# Patient Record
Sex: FEMALE | Race: WHITE | HISPANIC OR LATINO | Employment: FULL TIME | URBAN - METROPOLITAN AREA
[De-identification: names, ages, dates, MRNs, and addresses within clinical notes are randomized per-mention and may not be internally consistent; named-entity substitution may affect disease eponyms.]

---

## 2018-01-17 ENCOUNTER — ALLSCRIPTS OFFICE VISIT (OUTPATIENT)
Dept: OTHER | Facility: OTHER | Age: 28
End: 2018-01-17

## 2018-01-17 DIAGNOSIS — Z34.91 ENCOUNTER FOR SUPERVISION OF NORMAL PREGNANCY IN FIRST TRIMESTER: ICD-10-CM

## 2018-01-19 ENCOUNTER — GENERIC CONVERSION - ENCOUNTER (OUTPATIENT)
Dept: OTHER | Facility: OTHER | Age: 28
End: 2018-01-19

## 2018-01-19 ENCOUNTER — ALLSCRIPTS OFFICE VISIT (OUTPATIENT)
Dept: PERINATAL CARE | Facility: CLINIC | Age: 28
End: 2018-01-19
Payer: COMMERCIAL

## 2018-01-19 ENCOUNTER — APPOINTMENT (OUTPATIENT)
Dept: LAB | Facility: HOSPITAL | Age: 28
End: 2018-01-19
Payer: COMMERCIAL

## 2018-01-19 ENCOUNTER — TRANSCRIBE ORDERS (OUTPATIENT)
Dept: LAB | Facility: HOSPITAL | Age: 28
End: 2018-01-19

## 2018-01-19 DIAGNOSIS — Z34.91 ENCOUNTER FOR SUPERVISION OF NORMAL PREGNANCY IN FIRST TRIMESTER: ICD-10-CM

## 2018-01-19 LAB
ABO GROUP BLD: NORMAL
BASOPHILS # BLD AUTO: 0.01 THOUSANDS/ΜL (ref 0–0.1)
BASOPHILS NFR BLD AUTO: 0 % (ref 0–1)
BILIRUB UR QL STRIP: NEGATIVE
BLD GP AB SCN SERPL QL: NEGATIVE
CLARITY UR: NORMAL
COLOR UR: YELLOW
EOSINOPHIL # BLD AUTO: 0.07 THOUSAND/ΜL (ref 0–0.61)
EOSINOPHIL NFR BLD AUTO: 1 % (ref 0–6)
ERYTHROCYTE [DISTWIDTH] IN BLOOD BY AUTOMATED COUNT: 12.6 % (ref 11.6–15.1)
GLUCOSE 1H P 50 G GLC PO SERPL-MCNC: 87 MG/DL
GLUCOSE UR STRIP-MCNC: NEGATIVE MG/DL
HCT VFR BLD AUTO: 38.7 % (ref 34.8–46.1)
HGB BLD-MCNC: 13.8 G/DL (ref 11.5–15.4)
HGB UR QL STRIP.AUTO: NEGATIVE
KETONES UR STRIP-MCNC: NEGATIVE MG/DL
LEUKOCYTE ESTERASE UR QL STRIP: NEGATIVE
LYMPHOCYTES # BLD AUTO: 1.51 THOUSANDS/ΜL (ref 0.6–4.47)
LYMPHOCYTES NFR BLD AUTO: 25 % (ref 14–44)
MCH RBC QN AUTO: 29.3 PG (ref 26.8–34.3)
MCHC RBC AUTO-ENTMCNC: 35.7 G/DL (ref 31.4–37.4)
MCV RBC AUTO: 82 FL (ref 82–98)
MONOCYTES # BLD AUTO: 0.47 THOUSAND/ΜL (ref 0.17–1.22)
MONOCYTES NFR BLD AUTO: 8 % (ref 4–12)
NEUTROPHILS # BLD AUTO: 3.89 THOUSANDS/ΜL (ref 1.85–7.62)
NEUTS SEG NFR BLD AUTO: 66 % (ref 43–75)
NITRITE UR QL STRIP: NEGATIVE
NRBC BLD AUTO-RTO: 0 /100 WBCS
PH UR STRIP.AUTO: 6.5 [PH] (ref 4.5–8)
PLATELET # BLD AUTO: 294 THOUSANDS/UL (ref 149–390)
PMV BLD AUTO: 9.8 FL (ref 8.9–12.7)
PROT UR STRIP-MCNC: NEGATIVE MG/DL
RBC # BLD AUTO: 4.71 MILLION/UL (ref 3.81–5.12)
RH BLD: POSITIVE
RUBV IGG SERPL IA-ACNC: 78.5 IU/ML
SP GR UR STRIP.AUTO: 1.01 (ref 1–1.03)
SPECIMEN EXPIRATION DATE: NORMAL
UROBILINOGEN UR QL STRIP.AUTO: 0.2 E.U./DL
WBC # BLD AUTO: 5.96 THOUSAND/UL (ref 4.31–10.16)

## 2018-01-19 PROCEDURE — 82950 GLUCOSE TEST: CPT

## 2018-01-19 PROCEDURE — 87077 CULTURE AEROBIC IDENTIFY: CPT

## 2018-01-19 PROCEDURE — 36415 COLL VENOUS BLD VENIPUNCTURE: CPT

## 2018-01-19 PROCEDURE — 87086 URINE CULTURE/COLONY COUNT: CPT

## 2018-01-19 PROCEDURE — 76801 OB US < 14 WKS SINGLE FETUS: CPT | Performed by: OBSTETRICS & GYNECOLOGY

## 2018-01-19 PROCEDURE — 81003 URINALYSIS AUTO W/O SCOPE: CPT

## 2018-01-19 PROCEDURE — 80081 OBSTETRIC PANEL INC HIV TSTG: CPT

## 2018-01-20 LAB — HBV SURFACE AG SER QL: NORMAL

## 2018-01-22 ENCOUNTER — LAB REQUISITION (OUTPATIENT)
Dept: LAB | Facility: HOSPITAL | Age: 28
End: 2018-01-22
Payer: COMMERCIAL

## 2018-01-22 DIAGNOSIS — Z11.3 ENCOUNTER FOR SCREENING FOR INFECTIONS WITH PREDOMINANTLY SEXUAL MODE OF TRANSMISSION: ICD-10-CM

## 2018-01-22 DIAGNOSIS — Z34.91 ENCOUNTER FOR SUPERVISION OF NORMAL PREGNANCY IN FIRST TRIMESTER: ICD-10-CM

## 2018-01-22 DIAGNOSIS — Z01.419 ENCOUNTER FOR GYNECOLOGICAL EXAMINATION WITHOUT ABNORMAL FINDING: ICD-10-CM

## 2018-01-22 LAB
BACTERIA UR CULT: ABNORMAL
HIV 1+2 AB+HIV1 P24 AG SERPL QL IA: NORMAL
RPR SER QL: NORMAL

## 2018-01-22 PROCEDURE — G0145 SCR C/V CYTO,THINLAYER,RESCR: HCPCS | Performed by: NURSE PRACTITIONER

## 2018-01-22 PROCEDURE — 87491 CHLMYD TRACH DNA AMP PROBE: CPT | Performed by: NURSE PRACTITIONER

## 2018-01-22 PROCEDURE — 87591 N.GONORRHOEAE DNA AMP PROB: CPT | Performed by: NURSE PRACTITIONER

## 2018-01-23 VITALS
BODY MASS INDEX: 19.73 KG/M2 | HEART RATE: 96 BPM | WEIGHT: 107.2 LBS | SYSTOLIC BLOOD PRESSURE: 110 MMHG | HEIGHT: 62 IN | DIASTOLIC BLOOD PRESSURE: 78 MMHG

## 2018-01-23 NOTE — PROGRESS NOTES
RUBÉN 2018         RE: Kati Pham                           To: [address]   MR#: [id number]                                  [address]   : [address], [address] [facility]   ENC: [id number]                             Fax: [phone]   (Exam #: [id number])      The LMP of this 33year old,  G4, P1-0-2-1 patient was unknown, her   working BERONICA is AUG 21 2018 and the current gestational age is 10 weeks 4   days by [address] [place] [address]  A sonographic examination was performed on 2018 using real time equipment  The ultrasound examination was   performed using abdominal technique  The patient has a BMI of 20 6  Her   blood pressure today was 104/69  Pt states she is uncertain of start date of her LMP  Earliest US on   record:  MFM 18  9w4d  BERONICA 18 Multiple longitudinal and   transverse sections revealed a ponce intrauterine pregnancy  A normal gestational sac was documented  A normal fetal pole was   visualized  Cardiac motion was observed at 167 bpm  The yolk sac was seen,   measuring 0 29 cm  The amnion was also documented  INDICATIONS      confirm gestational age      Exam Types      Level I      RESULTS      Fetus # 1 of 1   Fetal growth appeared normal      MEASUREMENTS (* Included In Average GA)      CRL              2 9 cm        9 weeks 4 days *      THE AVERAGE GESTATIONAL AGE is 9 weeks 4 days +/- 7 days  ANATOMY COMMENTS      Anatomic detail is extremely limited at this gestational age  A discrete   fetal pole with cardiac motion was documented  Limb buds were documented   as well  The gestational sac is normal in appearance and located in the   fundus of the uterus  No gross abnormalities were noted on this   examination  Free fluid is not seen in the posterior cul-de-sac  There is   no suspicion of a subchorionic hematoma        ADNEXA      The left ovary appeared normal and measured 2 2 x 2 0 x 2 1 cm with a   volume of 4 7 cc  The right ovary appeared normal and measured 3 2 x 2 1 x   2 1 cm with a volume of 7 0 cc       AMNIOTIC FLUID         Largest Vertical Pocket = 2 5 cm   Amniotic Fluid: Normal      IMPRESSION      Dove IUP   9 weeks and 4 days by this ultrasound  (BERONICA=AUG 20 2018)   9 weeks and 4 days by 1st Tri Sono  (BERONICA=AUG 20 2018)   Fetal growth appeared normal   Regular fetal heart rate of 167 bpm      GENERAL COMMENT      Ms Eda Fuchs is here for pregnancy dating  There is a single live intrauterine pregnancy with biometry consistent   with gestational age established by outside ultrasound  No gross anomalies were identified on limited views  Amniotic fluid is within normal limits  Evaluation and Management:   The patient was counseled regarding the above findings  The limitations   of ultrasound were reviewed  The limitations of ultrasound and aneuploidy screening were explained to   her  Genetic screening options were discussed with her  She should   return in 3 weeks for nuchal translucency  At the conclusion of today's encounter, all questions were answered to her   satisfaction  Thank you very much for this kind referral and please do   not hesitate to contact me with any further questions or concerns  ROMERO Fox M D     Maternal Fetal Medicine   Electronically signed 01/19/18 17:50

## 2018-01-23 NOTE — MISCELLANEOUS
Message  Per Mirta Cochran, she recommends that pt take rhinocort allergy spray instead of flonase during the pregnancy  I called pt and gave her this info  Pt scheduled for PN H&P for 1/22/18  Active Problems    1  Encounter for supervision of normal pregnancy in first trimester (V22 1) (Z34 91)    Current Meds   1  Prenatal 27-0 8 MG Oral Tablet; TAKE 1 TABLET DAILY AS DIRECTED; Therapy: [id number] to (Evaluate:[id number]) Recorded    Allergies    1  No Known Drug Allergies    2  Dust   3  Dust Mite   4  Grass   5  Pollen   6  Seasonal   7  Trees    Plan  Encounter for supervision of normal pregnancy in first trimester    · (1) GLUCOSE, 1HR PG (50gm Glu Challenge Preg-Pts); Status:Active; Requested  CHRIS:[id number];    · (1) PRENATAL PANEL; Status:Active;  Requested RDS:[id number];    · *1 - [address] and Ultrasound Only  Level I Dating US  LMP 11/06/17  BERONICA 08/13/18  Status: Hold For - Scheduling  Requested for: [id number]  Care Summary provided  : Yes    Signatures   Electronically signed by : Larry Collado RN; Jan 18 2018  5:18PM EST                       (Author)

## 2018-01-24 VITALS
BODY MASS INDEX: 19.88 KG/M2 | SYSTOLIC BLOOD PRESSURE: 104 MMHG | WEIGHT: 108.04 LBS | HEIGHT: 62 IN | HEART RATE: 87 BPM | DIASTOLIC BLOOD PRESSURE: 69 MMHG

## 2018-01-25 ENCOUNTER — TELEPHONE (OUTPATIENT)
Dept: OBGYN CLINIC | Facility: CLINIC | Age: 28
End: 2018-01-25

## 2018-01-25 LAB
CHLAMYDIA DNA CVX QL NAA+PROBE: NORMAL
LAB AP GYN PRIMARY INTERPRETATION: NORMAL
Lab: NORMAL
N GONORRHOEA DNA GENITAL QL NAA+PROBE: NORMAL
PATH INTERP SPEC-IMP: NORMAL

## 2018-01-29 ENCOUNTER — TELEPHONE (OUTPATIENT)
Dept: OBGYN CLINIC | Facility: CLINIC | Age: 28
End: 2018-01-29

## 2018-02-19 ENCOUNTER — ROUTINE PRENATAL (OUTPATIENT)
Dept: OBGYN CLINIC | Facility: CLINIC | Age: 28
End: 2018-02-19
Payer: COMMERCIAL

## 2018-02-19 VITALS — DIASTOLIC BLOOD PRESSURE: 67 MMHG | WEIGHT: 107.2 LBS | BODY MASS INDEX: 19.93 KG/M2 | SYSTOLIC BLOOD PRESSURE: 95 MMHG

## 2018-02-19 DIAGNOSIS — Z34.82 ENCOUNTER FOR SUPERVISION OF OTHER NORMAL PREGNANCY IN SECOND TRIMESTER: Primary | ICD-10-CM

## 2018-02-19 DIAGNOSIS — Z13.79 GENETIC SCREENING: ICD-10-CM

## 2018-02-19 PROCEDURE — 99214 OFFICE O/P EST MOD 30 MIN: CPT | Performed by: NURSE PRACTITIONER

## 2018-02-19 RX ORDER — PYRIDOXINE HCL (VITAMIN B6) 25 MG
TABLET ORAL
COMMUNITY
Start: 2018-01-22 | End: 2018-07-26

## 2018-02-19 RX ORDER — PNV NO.95/FERROUS FUM/FOLIC AC 28MG-0.8MG
1 TABLET ORAL DAILY
COMMUNITY
Start: 2018-01-17 | End: 2020-01-28

## 2018-02-19 NOTE — PROGRESS NOTES
Reports her nausea is decreasing and she feels like she is getting her appetite back  She did not get the sequential screen part 1 because of insurance issues  We did review quad screen and genetic counseling because father of the baby has many family members with Jodi's chorea  Pap was negative on 2018 and cultures for [facility] and [facility] were both negative  Request for genetic counseling and  Center given patient  Request for quad screen given and information on quad screen given to patient  Patient has appointment for level 2 ultrasound on 2018 at [facility]  Reviewed with patient if she does not feel better within 2 weeks to return to office for weight check    Patient to return to office in 4 weeks

## 2018-02-19 NOTE — PATIENT INSTRUCTIONS
Next OB appointment in 4 weeks, return sooner if needed    Please make appointment for genetic counseling at the  center

## 2018-03-07 NOTE — PROGRESS NOTES
Education  Baby & Me Education 1st Trimester:   First Trimester Education provided:   benefits of breastfeeding, importance of exclusive breastfeeding, early initiation of breastfeeding, exclusive breastfeeding for the first 6 months, Pregnancy Essentials Reference Guide given and Other education given: Skin-to-skin, rooming-in, lactation consultant in hospital     The patient is planning on breastfeeding     Prenatal education provided by: Robyn Johnson RN      Signatures   Electronically signed by : Robyn Johnson RN; Jan 17 2018  5:00PM EST                       (Author)

## 2018-04-05 ENCOUNTER — ROUTINE PRENATAL (OUTPATIENT)
Dept: OBGYN CLINIC | Facility: CLINIC | Age: 28
End: 2018-04-05
Payer: COMMERCIAL

## 2018-04-05 VITALS — SYSTOLIC BLOOD PRESSURE: 122 MMHG | WEIGHT: 110.8 LBS | DIASTOLIC BLOOD PRESSURE: 72 MMHG | BODY MASS INDEX: 20.6 KG/M2

## 2018-04-05 DIAGNOSIS — L71.9 ROSACEA: Primary | ICD-10-CM

## 2018-04-05 DIAGNOSIS — Z3A.20 20 WEEKS GESTATION OF PREGNANCY: ICD-10-CM

## 2018-04-05 LAB
SL AMB  POCT GLUCOSE, UA: ABNORMAL
SL AMB POCT URINE PROTEIN: ABNORMAL

## 2018-04-05 PROCEDURE — 99213 OFFICE O/P EST LOW 20 MIN: CPT | Performed by: OBSTETRICS & GYNECOLOGY

## 2018-04-05 PROCEDURE — 81002 URINALYSIS NONAUTO W/O SCOPE: CPT | Performed by: OBSTETRICS & GYNECOLOGY

## 2018-04-05 RX ORDER — METRONIDAZOLE 7.5 MG/G
GEL TOPICAL 2 TIMES DAILY
Qty: 45 G | Refills: 0 | Status: SHIPPED | OUTPATIENT
Start: 2018-04-05 | End: 2018-07-26

## 2018-04-05 NOTE — PROGRESS NOTES
Subjective     Jhonny Martel is a 33 y o  female being seen today for her obstetrical visit  She is at 20w3d gestation  Patient reports no bleeding, no contractions, no cramping, no leaking and however complains of thin white discharge and her roseacea acting up  Fetal movement: normal  Patient states that she has had yeast infections in the past and her current symptoms do not feel like a yeast infection  No pruritis, foul smelling discharge, urinary symptoms  She also states that her nausea/vomiting symptoms have also improved significantly since last visit  Menstrual History:  OB History      Para Term  AB Living    4 1 1 0 2 1    SAB TAB Ectopic Multiple Live Births    1 1 0 0 1         No LMP recorded  Patient is pregnant  The following portions of the patient's history were reviewed and updated as appropriate: allergies, current medications, past family history, past medical history, past social history, past surgical history and problem list     Review of Systems    Review of Systems   Constitutional: Negative for chills, fatigue and fever  Respiratory: Negative for cough, choking, chest tightness, shortness of breath and wheezing  Cardiovascular: Negative for chest pain, palpitations and leg swelling  Gastrointestinal: Negative for abdominal pain, constipation, diarrhea, nausea and vomiting  Genitourinary: Negative for decreased urine volume, dysuria, frequency, hematuria, pelvic pain, vaginal bleeding, vaginal discharge and vaginal pain  Musculoskeletal: Negative for arthralgias, back pain, joint swelling and myalgias  Skin: Negative  Neurological: Negative for dizziness, tremors, seizures, weakness, light-headedness, numbness and headaches  Psychiatric/Behavioral: Negative            Objective      /72   Wt 50 3 kg (110 lb 12 8 oz)   BMI 20 60 kg/m²   FHT: 150 BPM   Uterine Size: 20 cm and size equals dates      Gen: NAD, A&O  CVS: RRR (+) S1S2  Pulm: CTAB, symmetric  Abd: Soft, NT, normoactive bowel sounds  Gravid, nontender uterus  Ext: Atraumatic, (-) edema  Assessment    Pregnancy 20 and 3/7 weeks   Rosacea    Plan    · Prescribed metrogel for rosacea  Counseled patient on safety of metronidazole in pregnancy  While it is considered teratogenic in the first trimester, she is now in her second trimester  Additionally, the amount of the medication absorbed through her skin into the blood stream is minimal / negligible  · Counseled patient that her discharge is likely normal physiologic discharge  Advised that if it persists, causes her any discomfort, changes in character (color, consistency, odor), she may call to follow up  · Counseled that consent for sterilization may be signed at 32 weeks  Advised that with a natural delivery, sterilization may not be done until at least 6 weeks postpartum / after postpartum check  With C/S sterilization may be done at the time of the procedure  Patient will continue to think about her options  · Nausea/vomiting resolved  · Quad screen never done  Advised patient of window to get it done (before 23w GA)  · Continue PNVs   · Signs and symptoms of  labor: discussed  · Next  Ctr appointment 5/3/18  · Follow up in 4 weeks  All patient questions and concerns addressed  Patient agrees with her treatment plan  RTO 4 weeks       Pati Partida DO  18  10:14 AM

## 2018-04-16 ENCOUNTER — ROUTINE PRENATAL (OUTPATIENT)
Dept: OBGYN CLINIC | Facility: HOSPITAL | Age: 28
End: 2018-04-16
Payer: COMMERCIAL

## 2018-04-16 ENCOUNTER — TELEPHONE (OUTPATIENT)
Dept: OBGYN CLINIC | Facility: CLINIC | Age: 28
End: 2018-04-16

## 2018-04-16 VITALS
DIASTOLIC BLOOD PRESSURE: 72 MMHG | HEIGHT: 62 IN | HEART RATE: 111 BPM | BODY MASS INDEX: 20.8 KG/M2 | SYSTOLIC BLOOD PRESSURE: 105 MMHG | WEIGHT: 113 LBS

## 2018-04-16 DIAGNOSIS — Z3A.22 22 WEEKS GESTATION OF PREGNANCY: ICD-10-CM

## 2018-04-16 DIAGNOSIS — R30.0 BURNING WITH URINATION: Primary | ICD-10-CM

## 2018-04-16 DIAGNOSIS — Z34.92 SECOND TRIMESTER PREGNANCY: ICD-10-CM

## 2018-04-16 PROBLEM — O21.9 NAUSEA AND VOMITING IN PREGNANCY: Status: ACTIVE | Noted: 2018-01-22

## 2018-04-16 LAB
SL AMB  POCT GLUCOSE, UA: NORMAL
SL AMB LEUKOCYTE ESTERASE,UA: NORMAL
SL AMB POCT BILIRUBIN,UA: NORMAL
SL AMB POCT BLOOD,UA: NORMAL
SL AMB POCT CLARITY,UA: CLEAR
SL AMB POCT COLOR,UA: YELLOW
SL AMB POCT KETONES,UA: NORMAL
SL AMB POCT NITRITE,UA: NORMAL
SL AMB POCT PH,UA: 7.5
SL AMB POCT SPECIFIC GRAVITY,UA: 1.01
SL AMB POCT URINE PROTEIN: NORMAL
SL AMB POCT UROBILINOGEN: 0.2

## 2018-04-16 PROCEDURE — 87086 URINE CULTURE/COLONY COUNT: CPT | Performed by: OBSTETRICS & GYNECOLOGY

## 2018-04-16 PROCEDURE — 99213 OFFICE O/P EST LOW 20 MIN: CPT | Performed by: OBSTETRICS & GYNECOLOGY

## 2018-04-16 PROCEDURE — 81002 URINALYSIS NONAUTO W/O SCOPE: CPT | Performed by: OBSTETRICS & GYNECOLOGY

## 2018-04-16 RX ORDER — NITROFURANTOIN 25; 75 MG/1; MG/1
100 CAPSULE ORAL 2 TIMES DAILY
Qty: 14 CAPSULE | Refills: 0 | Status: SHIPPED | OUTPATIENT
Start: 2018-04-16 | End: 2018-07-26

## 2018-04-16 NOTE — PROGRESS NOTES
Patient for routine prenatal   She has been complaining 2 days of burning with urination  She had intermittently 2 days ago but noticed over the last day that is been every time she voids  She also notices suprapubic discomfort  She has had a history of a urinary tract infection "many years ago"  She says it feels like that  She denies any abnormal vaginal discharge  She denies any vaginal bleeding  She denies any fevers or chills  Urinary tract infection:  First urine specimen was not a clean catch  Patient to try to void again as a clean-catch  Will empirically treat for urinary tract infection with Macrobid 100 mg twice daily for 7 days  If patient unable to void and a for urine culture now, will provide patient with lab slip for outpatient urine culture collection  Patient given  Precautions to call if she has any fevers, chills, worsening suprapubic pain, or back pain  Pregnancy at 22 weeks:  Patient has not yet had level 2 ultrasound  Per her, she was not scheduled until May because of the dating issue with the  Center  She then tried to reschedule but there were no points available  I discussed the Abran Pyle, who will contact the [facility]  tomorrow to schedule an appointment for level 2 ultrasound  At a sooner date

## 2018-04-16 NOTE — PATIENT INSTRUCTIONS
Pregnancy at 23 to 22 [address]:   Now that you are in your second trimester, you have more energy  You may also be feeling hungrier than usual  You may be gaining about ½ to 1 pound a week, and your pregnancy is beginning to show  You may need to start wearing maternity clothes  As your baby gets larger, you may have other symptoms  These may include body aches or stretch marks on your abdomen, breasts, thighs, or buttocks  DISCHARGE INSTRUCTIONS:   Seek care immediately if:   · You develop a severe headache that does not go away  · You have new or increased vision changes, such as blurred or spotted vision  · You have new or increased swelling in your face or hands  · You have vaginal spotting or bleeding  · Your water broke or you feel warm water gushing or trickling from your vagina  Contact your healthcare provider if:   · You have abdominal cramps, pressure, or tightening  · You have a change in vaginal discharge  · You cannot keep food or drinks down, and you are losing weight  · You have chills or a fever  · You have vaginal itching, burning, or pain  · You have yellow, green, white, or foul-smelling vaginal discharge  · You have pain or burning when you urinate, less urine than usual, or pink or bloody urine  · You have questions or concerns about your condition or care  How to care for yourself at this stage of your pregnancy:   · Eat a variety of healthy foods  Healthy foods include fruits, vegetables, whole-grain breads, low-fat dairy foods, beans, lean meats, and fish  Drink liquids as directed  Ask how much liquid to drink each day and which liquids are best for you  Limit caffeine to less than 200 milligrams each day  Limit your intake of fish to 2 servings each week  Choose fish low in mercury such as canned light tuna, shrimp, salmon, cod, or tilapia  Do not  eat fish high in mercury such as swordfish, tilefish, zoifa mackerel, and shark  · Take prenatal vitamins as directed  Your need for certain vitamins and minerals, such as folic acid, increases during pregnancy  Prenatal vitamins provide some of the extra vitamins and minerals you need  Prenatal vitamins may also help to decrease the risk of certain birth defects  · Talk to your healthcare provider about exercise  Moderate exercise can help you stay fit  Your healthcare provider will help you plan an exercise program that is safe for you during pregnancy  · Do not smoke  If you smoke, it is never too late to quit  Smoking increases your risk of a miscarriage and other health problems during your pregnancy  Smoking can cause your baby to be born too early or weigh less at birth  Ask your healthcare provider for information if you need help quitting  · Do not drink alcohol  Alcohol passes from your body to your baby through the placenta  It can affect your baby's brain development and cause fetal alcohol syndrome (FAS)  FAS is a group of conditions that causes mental, behavior, and growth problems  · Talk to your healthcare provider before you take any medicines  Many medicines may harm your baby if you take them when you are pregnant  Do not take any medicines, vitamins, herbs, or supplements without first talking to your healthcare provider  Never use illegal or street drugs (such as marijuana or cocaine) while you are pregnant  Safety tips during pregnancy:   · Avoid hot tubs and saunas  Do not use a hot tub or sauna while you are pregnant, especially during your first trimester  Hot tubs and saunas may raise your baby's temperature and increase the risk of birth defects  · Avoid toxoplasmosis  This is an infection caused by eating raw meat or being around infected cat feces  It can cause birth defects, miscarriages, and other problems  Wash your hands after you touch raw meat  Make sure any meat is well-cooked before you eat it   Avoid raw eggs and unpasteurized milk  Use gloves or ask someone else to clean your cat's litter box while you are pregnant  Changes that are happening with your baby:  By 22 weeks, your baby is about 8 inches long from the top of the head to the rump (baby's bottom)  Your baby also weighs about 1 pound  Your baby is becoming much more active  You may be able to feel the baby move inside you now  The first movements may not be that noticeable  They may feel like a fluttering sensation  As time goes on, your baby's movements will become stronger and more noticeable  What you need to know about prenatal care:  During the first 28 weeks of your pregnancy, you will see your healthcare provider once a month  Your healthcare provider will check your blood pressure and weight  You may also need the following:  · A urine test  may also be done to check for sugar and protein  These can be signs of gestational diabetes or infection  Protein in your urine may also be a sign of preeclampsia  Preeclampsia is a condition that can develop during week 20 or later of your pregnancy  It causes high blood pressure, and it can cause problems with your kidneys and other organs  · Fundal height  is a measurement of your uterus to check your baby's growth  This number is usually the same as the number of weeks that you have been pregnant  · A fetal ultrasound  shows pictures of your baby inside your uterus  It shows your baby's development  The movement and position of your baby can also be seen  Your healthcare provider may be able to tell you what your baby's gender is during the ultrasound  · Your baby's heart rate  will be checked  © 2017 [address] Information is for End User's use only and may not be sold, redistributed or otherwise used for commercial purposes  All illustrations and images included in CareNotes® are the copyrighted property of A D [employer]  or [employer]    The above information is an  only  It is not intended as medical advice for individual conditions or treatments  Talk to your doctor, nurse or pharmacist before following any medical regimen to see if it is safe and effective for you  Urinary Tract Infection in Pregnancy   AMBULATORY CARE:   A urinary tract infection (UTI)  is caused by bacteria that get inside your urinary tract  The urinary tract includes your kidneys and bladder  UTIs are common during pregnancy  This is because of changes in your immune system, hormones, and uterus  As your uterus grows, your bladder may not completely empty  Bacteria can grow in the urine left in your bladder and cause a UTI  UTIs during pregnancy can increase your risk for a kidney infection and  labor  Commons signs and symptoms of a UTI:   · Urinating more often, leaking urine, or waking from sleep to urinate    · Pain or burning when you urinate    · Pain or pressure in your lower abdomen     · Urine that smells bad    · Blood in your urine  Seek care immediately if:   · You are urinating very little or not at all  · You have severe pain  · You have a fever and chills  Contact your healthcare provider if:   · You have pain in the sides of your back  · You do not feel better after 2 days of treatment  · You are vomiting  · You have questions or concerns about your condition or care  Prevent a UTI:   · Urinate when you feel the urge  Do not hold your urine  Urinate as soon as needed  Always urinate after you have sex  This helps flush out bacteria passed during sex  · Drink liquids as directed  Ask how much liquid to drink each day and which liquids are best for you  You may need to drink more fluids than usual to help flush bacteria out of your urinary tract  Do not drink caffeine or carbonated liquids  These drinks can irritate your bladder  Your healthcare provider may recommend cranberry juice to help prevent a UTI      · Wipe from front to back after you urinate or have a bowel movement  This will help prevent germs from getting into your urinary tract through your urethra  · Do pelvic muscle exercises often  Pelvic muscle exercises may help you start and stop urinating  Strong pelvic muscles may help you empty your bladder easier  Squeeze these muscles tightly for 5 seconds like you are trying to hold back urine  Then relax for 5 seconds  Gradually work up to squeezing for 10 seconds  Do 3 sets of 15 repetitions a day, or as directed  Follow up with your healthcare provider as directed: You may need to return for more urine tests  Write down your questions so you remember to ask them during your visits  © 2017 [address] Information is for End User's use only and may not be sold, redistributed or otherwise used for commercial purposes  All illustrations and images included in CareNotes® are the copyrighted property of A D A [employer]  or [employer]  The above information is an  only  It is not intended as medical advice for individual conditions or treatments  Talk to your doctor, nurse or pharmacist before following any medical regimen to see if it is safe and effective for you

## 2018-04-16 NOTE — TELEPHONE ENCOUNTER
Pt called with c/o burning with urination and urinary frequency  Pt is 22wks pregnant  I told pt I do not have any provider here today  I provided pt with phone number to [place] office and sent task for RN from [place] office to call pt to assist with getting an appt or sending for urine test  Pt is working in [place] today and can go for appt after work  Informed pt that if she is not able to get an order for urine test or an appt today, she should go to Urgent Care

## 2018-04-17 LAB — BACTERIA UR CULT: NORMAL

## 2018-05-03 ENCOUNTER — ROUTINE PRENATAL (OUTPATIENT)
Dept: PERINATAL CARE | Facility: CLINIC | Age: 28
End: 2018-05-03
Payer: COMMERCIAL

## 2018-05-03 VITALS
HEIGHT: 62 IN | BODY MASS INDEX: 21.35 KG/M2 | SYSTOLIC BLOOD PRESSURE: 106 MMHG | HEART RATE: 102 BPM | WEIGHT: 116 LBS | DIASTOLIC BLOOD PRESSURE: 74 MMHG

## 2018-05-03 DIAGNOSIS — Z3A.24 24 WEEKS GESTATION OF PREGNANCY: ICD-10-CM

## 2018-05-03 DIAGNOSIS — O40.2XX0 POLYHYDRAMNIOS IN SINGLETON PREGNANCY IN SECOND TRIMESTER: Primary | ICD-10-CM

## 2018-05-03 DIAGNOSIS — Z36.3 ENCOUNTER FOR ANTENATAL SCREENING FOR MALFORMATIONS: ICD-10-CM

## 2018-05-03 PROCEDURE — 99212 OFFICE O/P EST SF 10 MIN: CPT | Performed by: OBSTETRICS & GYNECOLOGY

## 2018-05-03 PROCEDURE — 76811 OB US DETAILED SNGL FETUS: CPT | Performed by: OBSTETRICS & GYNECOLOGY

## 2018-05-03 NOTE — LETTER
May 3, 2018     Lian Foote MD  [address] [place] [address]    Patient: Jhonny Martel   YOB: 1990   Date of Visit: 5/3/2018       Dear Dr Sally Giles: Thank you for referring Jhonny Martel to me for evaluation  Below are my notes for this consultation  If you have questions, please do not hesitate to call me  I look forward to following your patient along with you  Sincerely,        Annie Montes MD        CC: [address] [place] [address]  Annie Montes MD  5/3/2018  6:22 PM  Sign at close encounter  Please refer to the [facility] ultrasound report in Ob Procedures for additional information regarding the visit to the [facility]  today

## 2018-05-03 NOTE — PROGRESS NOTES
Please refer to the [facility] ultrasound report in Ob Procedures for additional information regarding the visit to the [address] [place] [address] today

## 2018-05-07 ENCOUNTER — ROUTINE PRENATAL (OUTPATIENT)
Dept: OBGYN CLINIC | Facility: CLINIC | Age: 28
End: 2018-05-07
Payer: COMMERCIAL

## 2018-05-07 VITALS
HEIGHT: 62 IN | BODY MASS INDEX: 21.64 KG/M2 | SYSTOLIC BLOOD PRESSURE: 101 MMHG | HEART RATE: 105 BPM | DIASTOLIC BLOOD PRESSURE: 68 MMHG | WEIGHT: 117.6 LBS

## 2018-05-07 DIAGNOSIS — Z34.92 SECOND TRIMESTER PREGNANCY: Primary | ICD-10-CM

## 2018-05-07 DIAGNOSIS — Z3A.25 25 WEEKS GESTATION OF PREGNANCY: ICD-10-CM

## 2018-05-07 DIAGNOSIS — O40.2XX0 POLYHYDRAMNIOS IN SINGLETON PREGNANCY IN SECOND TRIMESTER: ICD-10-CM

## 2018-05-07 PROCEDURE — 99213 OFFICE O/P EST LOW 20 MIN: CPT | Performed by: NURSE PRACTITIONER

## 2018-05-07 NOTE — PATIENT INSTRUCTIONS
Pregnancy at 32 to 30 Weeks   AMBULATORY CARE:   What changes are happening to your body: You may notice new symptoms such as shortness of breath, heartburn, or swelling of your ankles and feet  You may also have trouble sleeping or contractions  Seek care immediately if:   · You develop a severe headache that does not go away  · You have new or increased vision changes, such as blurred or spotted vision  · You have new or increased swelling in your face or hands  · You have vaginal spotting or bleeding  · Your water broke or you feel warm water gushing or trickling from your vagina  Contact your healthcare provider if:   · You have more than 5 contractions in 1 hour  · You notice any changes in your baby's movements  · You have abdominal cramps, pressure, or tightening  · You have a change in vaginal discharge  · You have chills or a fever  · You have vaginal itching, burning, or pain  · You have yellow, green, white, or foul-smelling vaginal discharge  · You have pain or burning when you urinate, less urine than usual, or pink or bloody urine  · You have questions or concerns about your condition or care  How to care for yourself at this stage of your pregnancy:   · Eat a variety of healthy foods  Healthy foods include fruits, vegetables, whole-grain breads, low-fat dairy foods, beans, lean meats, and fish  Drink liquids as directed  Ask how much liquid to drink each day and which liquids are best for you  Limit caffeine to less than 200 milligrams each day  Limit your intake of fish to 2 servings each week  Choose fish low in mercury such as canned light tuna, shrimp, salmon, cod, or tilapia  Do not  eat fish high in mercury such as swordfish, tilefish, zofia mackerel, and shark  · Manage heartburn  by eating 4 or 5 small meals each day instead of large meals  Avoid spicy food  · Manage swelling  by lying down and putting your feet up       · Take prenatal vitamins as directed  Your need for certain vitamins and minerals, such as folic acid, increases during pregnancy  Prenatal vitamins provide some of the extra vitamins and minerals you need  Prenatal vitamins may also help to decrease the risk of certain birth defects  · Talk to your healthcare provider about exercise  Moderate exercise can help you stay fit  Your healthcare provider will help you plan an exercise program that is safe for you during pregnancy  · Do not smoke  If you smoke, it is never too late to quit  Smoking increases your risk of a miscarriage and other health problems during your pregnancy  Smoking can cause your baby to be born too early or weigh less at birth  Ask your healthcare provider for information if you need help quitting  · Do not drink alcohol  Alcohol passes from your body to your baby through the placenta  It can affect your baby's brain development and cause fetal alcohol syndrome (FAS)  FAS is a group of conditions that causes mental, behavior, and growth problems  · Talk to your healthcare provider before you take any medicines  Many medicines may harm your baby if you take them when you are pregnant  Do not take any medicines, vitamins, herbs, or supplements without first talking to your healthcare provider  Never use illegal or street drugs (such as marijuana or cocaine) while you are pregnant  Safety tips during pregnancy:   · Avoid hot tubs and saunas  Do not use a hot tub or sauna while you are pregnant, especially during your first trimester  Hot tubs and saunas may raise your baby's temperature and increase the risk of birth defects  · Avoid toxoplasmosis  This is an infection caused by eating raw meat or being around infected cat feces  It can cause birth defects, miscarriages, and other problems  Wash your hands after you touch raw meat  Make sure any meat is well-cooked before you eat it  Avoid raw eggs and unpasteurized milk   Use gloves or ask someone else to clean your cat's litter box while you are pregnant  Changes that are happening with your baby:  By 30 weeks, your baby may weigh more than 3 pounds  Your baby may be about 11 inches long from the top of the head to the rump (baby's bottom)  Your baby's eyes open and close now  Your baby's kicks and movements are more forceful at this time  What you need to know about prenatal care: Your healthcare provider will check your blood pressure and weight  You may also need the following:  · Blood tests  may be done to check for anemia or blood type  · A urine test  may also be done to check for sugar and protein  These can be signs of gestational diabetes or infection  Protein in your urine may also be a sign of preeclampsia  Preeclampsia is a condition that can develop during week 20 or later of your pregnancy  It causes high blood pressure, and it can cause problems with your kidneys and other organs  · A Tdap vaccine and flu vaccine  may be recommended by your healthcare provider  · A gestational diabetes screen  will be done using an oral glucose tolerance test (OGTT)  An OGTT starts with a blood sugar level check after you have not eaten for 8 hours  You are then given a glucose drink  Your blood sugar level is checked after 1 hour, 2 hours, and sometimes 3 hours  Healthcare providers look at how much your blood sugar level increases from the first check  · Fundal height  is a measurement of your uterus to check your baby's growth  This number is usually the same as the number of weeks that you have been pregnant  Your healthcare provider may also check your baby's position  · Your baby's heart rate  will be checked  © 2017 [facility] Information is for End User's use only and may not be sold, redistributed or otherwise used for commercial purposes   All illustrations and images included in CareNotes® are the copyrighted property of [employer]  or [facility]  The above information is an  only  It is not intended as medical advice for individual conditions or treatments  Talk to your doctor, nurse or pharmacist before following any medical regimen to see if it is safe and effective for you

## 2018-05-07 NOTE — PROGRESS NOTES
Patient had  Center ultrasound done 2018  Mild polyhydramnios present she has a follow-up ultrasound scheduled in 4 weeks on 2018  She was treated for UTI  She denies any urinary symptoms and urinary dip today is negative  She feels good fetal movement, denies any leakage of fluid, vaginal bleeding or abdominal pain  States she feels well and is eating better  O+ blood type          58-year-old  at 25 weeks and 0 days  Lab request for 28 weeks labs given, to complete prior to next  appointment  Return to office at 28 weeks  Reviewed precautions

## 2018-06-02 ENCOUNTER — APPOINTMENT (OUTPATIENT)
Dept: LAB | Facility: HOSPITAL | Age: 28
End: 2018-06-02
Payer: COMMERCIAL

## 2018-06-02 ENCOUNTER — TRANSCRIBE ORDERS (OUTPATIENT)
Dept: LAB | Facility: HOSPITAL | Age: 28
End: 2018-06-02

## 2018-06-02 DIAGNOSIS — Z34.92 SECOND TRIMESTER PREGNANCY: ICD-10-CM

## 2018-06-02 LAB
BASOPHILS # BLD AUTO: 0.02 THOUSANDS/ΜL (ref 0–0.1)
BASOPHILS NFR BLD AUTO: 0 % (ref 0–1)
EOSINOPHIL # BLD AUTO: 0.09 THOUSAND/ΜL (ref 0–0.61)
EOSINOPHIL NFR BLD AUTO: 1 % (ref 0–6)
ERYTHROCYTE [DISTWIDTH] IN BLOOD BY AUTOMATED COUNT: 12.9 % (ref 11.6–15.1)
GLUCOSE 1H P 100 G GLC PO SERPL-MCNC: 124 MG/DL (ref 65–179)
HCT VFR BLD AUTO: 33.2 % (ref 34.8–46.1)
HGB BLD-MCNC: 10.8 G/DL (ref 11.5–15.4)
IMM GRANULOCYTES # BLD AUTO: 0.06 THOUSAND/UL (ref 0–0.2)
IMM GRANULOCYTES NFR BLD AUTO: 1 % (ref 0–2)
LYMPHOCYTES # BLD AUTO: 1.09 THOUSANDS/ΜL (ref 0.6–4.47)
LYMPHOCYTES NFR BLD AUTO: 14 % (ref 14–44)
MCH RBC QN AUTO: 29.5 PG (ref 26.8–34.3)
MCHC RBC AUTO-ENTMCNC: 32.5 G/DL (ref 31.4–37.4)
MCV RBC AUTO: 91 FL (ref 82–98)
MONOCYTES # BLD AUTO: 0.52 THOUSAND/ΜL (ref 0.17–1.22)
MONOCYTES NFR BLD AUTO: 7 % (ref 4–12)
NEUTROPHILS # BLD AUTO: 6.04 THOUSANDS/ΜL (ref 1.85–7.62)
NEUTS SEG NFR BLD AUTO: 77 % (ref 43–75)
NRBC BLD AUTO-RTO: 0 /100 WBCS
PLATELET # BLD AUTO: 300 THOUSANDS/UL (ref 149–390)
PMV BLD AUTO: 10.1 FL (ref 8.9–12.7)
RBC # BLD AUTO: 3.66 MILLION/UL (ref 3.81–5.12)
WBC # BLD AUTO: 7.82 THOUSAND/UL (ref 4.31–10.16)

## 2018-06-02 PROCEDURE — 85025 COMPLETE CBC W/AUTO DIFF WBC: CPT

## 2018-06-02 PROCEDURE — 86592 SYPHILIS TEST NON-TREP QUAL: CPT

## 2018-06-02 PROCEDURE — 36415 COLL VENOUS BLD VENIPUNCTURE: CPT

## 2018-06-04 LAB — RPR SER QL: NORMAL

## 2018-06-08 ENCOUNTER — ROUTINE PRENATAL (OUTPATIENT)
Dept: OBGYN CLINIC | Facility: CLINIC | Age: 28
End: 2018-06-08
Payer: COMMERCIAL

## 2018-06-08 VITALS
BODY MASS INDEX: 22.16 KG/M2 | WEIGHT: 120.4 LBS | HEIGHT: 62 IN | SYSTOLIC BLOOD PRESSURE: 104 MMHG | HEART RATE: 112 BPM | DIASTOLIC BLOOD PRESSURE: 70 MMHG

## 2018-06-08 DIAGNOSIS — Z34.90 ENCOUNTER FOR PRENATAL CARE: Primary | ICD-10-CM

## 2018-06-08 DIAGNOSIS — O99.613 CONSTIPATION DURING PREGNANCY IN THIRD TRIMESTER: ICD-10-CM

## 2018-06-08 DIAGNOSIS — Z3A.29 29 WEEKS GESTATION OF PREGNANCY: ICD-10-CM

## 2018-06-08 DIAGNOSIS — O99.013 ANEMIA DURING PREGNANCY IN THIRD TRIMESTER: ICD-10-CM

## 2018-06-08 DIAGNOSIS — K59.00 CONSTIPATION DURING PREGNANCY IN THIRD TRIMESTER: ICD-10-CM

## 2018-06-08 PROCEDURE — 99213 OFFICE O/P EST LOW 20 MIN: CPT | Performed by: OBSTETRICS & GYNECOLOGY

## 2018-06-08 PROCEDURE — 90715 TDAP VACCINE 7 YRS/> IM: CPT | Performed by: OBSTETRICS & GYNECOLOGY

## 2018-06-08 PROCEDURE — 90471 IMMUNIZATION ADMIN: CPT | Performed by: OBSTETRICS & GYNECOLOGY

## 2018-06-08 RX ORDER — DOCUSATE SODIUM 100 MG/1
100 CAPSULE, LIQUID FILLED ORAL 2 TIMES DAILY PRN
Qty: 90 CAPSULE | Refills: 1 | Status: SHIPPED | OUTPATIENT
Start: 2018-06-08 | End: 2018-07-26

## 2018-06-08 RX ORDER — FERROUS SULFATE TAB EC 324 MG (65 MG FE EQUIVALENT) 324 (65 FE) MG
324 TABLET DELAYED RESPONSE ORAL
Qty: 90 TABLET | Refills: 1 | Status: SHIPPED | OUTPATIENT
Start: 2018-06-08 | End: 2018-09-10

## 2018-06-08 NOTE — PROGRESS NOTES
Patient is a 32 yo  at 29wk4d presenting for routine prenatal visit  History of mild polyhydramnios - follow up ultrasound scheduled 18  (+) FM  (-) LOF, VB, CTX  28 weeks labs completed: 1 hr glucola normal @ 124, RPR negative, Hgb 10 8  Urine negative for protein, glucose  No other complaints or concerns  Previously treated empirically for UTI with Macrobid [id number] (urine culture 10-19,000 mixed contaminants)  A/P:  -Tdap given today  -Ferrous sulfate 325mg daily for anemia with Colace to avoid constipation     -RTC in 2 weeks or sooner if problems arise  -PTL precautions discussed  -follow up as scheduled 18 for repeat ultrasound  -as urine culture was mixed contaminants, NAHUM not needed    Discussed above with Dr Chayo Meneses

## 2018-06-14 ENCOUNTER — ULTRASOUND (OUTPATIENT)
Dept: PERINATAL CARE | Facility: CLINIC | Age: 28
End: 2018-06-14
Payer: COMMERCIAL

## 2018-06-14 VITALS
WEIGHT: 123 LBS | BODY MASS INDEX: 22.63 KG/M2 | SYSTOLIC BLOOD PRESSURE: 107 MMHG | DIASTOLIC BLOOD PRESSURE: 76 MMHG | HEIGHT: 62 IN | HEART RATE: 102 BPM

## 2018-06-14 DIAGNOSIS — Z36.89 ENCOUNTER FOR ULTRASOUND TO CHECK FETAL GROWTH: Primary | ICD-10-CM

## 2018-06-14 DIAGNOSIS — Z3A.30 30 WEEKS GESTATION OF PREGNANCY: ICD-10-CM

## 2018-06-14 DIAGNOSIS — O40.3XX0 POLYHYDRAMNIOS IN THIRD TRIMESTER COMPLICATION, SINGLE OR UNSPECIFIED FETUS: ICD-10-CM

## 2018-06-14 PROBLEM — O40.2XX0 POLYHYDRAMNIOS IN SINGLETON PREGNANCY IN SECOND TRIMESTER: Status: RESOLVED | Noted: 2018-05-03 | Resolved: 2018-06-14

## 2018-06-14 PROCEDURE — 76816 OB US FOLLOW-UP PER FETUS: CPT | Performed by: OBSTETRICS & GYNECOLOGY

## 2018-06-14 NOTE — PROGRESS NOTES
[address]: Ms Concha Fothergill was seen today at 30w3d for fetal growth and followup missed anatomy ultrasound  See ultrasound report under "OB Procedures" tab  Please don't hesitate to contact our office with any concerns or questions    Mohan Jules MD

## 2018-06-14 NOTE — PATIENT INSTRUCTIONS
Thank you for choosing [facility] for your  care today  If you have any questions about your ultrasound or care, please do not hesitate to contact us or your primary obstetrician  At this time, no additional ultrasounds are advised through the  center, however, if your doctors would like you to have any additional ultrasounds, they will let us know

## 2018-06-28 ENCOUNTER — ROUTINE PRENATAL (OUTPATIENT)
Dept: OBGYN CLINIC | Facility: CLINIC | Age: 28
End: 2018-06-28
Payer: COMMERCIAL

## 2018-06-28 VITALS
HEART RATE: 97 BPM | HEIGHT: 62 IN | DIASTOLIC BLOOD PRESSURE: 77 MMHG | SYSTOLIC BLOOD PRESSURE: 114 MMHG | BODY MASS INDEX: 22.67 KG/M2 | WEIGHT: 123.2 LBS

## 2018-06-28 DIAGNOSIS — Z3A.32 32 WEEKS GESTATION OF PREGNANCY: ICD-10-CM

## 2018-06-28 DIAGNOSIS — O99.013 ANEMIA DURING PREGNANCY IN THIRD TRIMESTER: Primary | ICD-10-CM

## 2018-06-28 PROCEDURE — 99213 OFFICE O/P EST LOW 20 MIN: CPT | Performed by: NURSE PRACTITIONER

## 2018-06-28 NOTE — PATIENT INSTRUCTIONS
Pregnancy at 31 to 34 Weeks   AMBULATORY CARE:   What changes are happening to your body: You may continue to have symptoms such as shortness of breath, heartburn, contractions, or swelling of your ankles and feet  You may be gaining about 1 pound a week now  Seek care immediately if:   · You develop a severe headache that does not go away  · You have new or increased vision changes, such as blurred or spotted vision  · You have new or increased swelling in your face or hands  · You have vaginal spotting or bleeding  · Your water broke or you feel warm water gushing or trickling from your vagina  Contact your healthcare provider if:   · You have more than 5 contractions in 1 hour  · You notice any changes in your baby's movements  · You have abdominal cramps, pressure, or tightening  · You have a change in vaginal discharge  · You have chills or a fever  · You have vaginal itching, burning, or pain  · You have yellow, green, white, or foul-smelling vaginal discharge  · You have pain or burning when you urinate, less urine than usual, or pink or bloody urine  · You have questions or concerns about your condition or care  How to care for yourself at this stage of your pregnancy:   · Eat a variety of healthy foods  Healthy foods include fruits, vegetables, whole-grain breads, low-fat dairy foods, beans, lean meats, and fish  Drink liquids as directed  Ask how much liquid to drink each day and which liquids are best for you  Limit caffeine to less than 200 milligrams each day  Limit your intake of fish to 2 servings each week  Choose fish low in mercury such as canned light tuna, shrimp, salmon, cod, or tilapia  Do not  eat fish high in mercury such as swordfish, tilefish, zofia mackerel, and shark  · Manage heartburn  by eating 4 or 5 small meals each day instead of large meals  Avoid spicy food  · Manage swelling  by lying down and putting your feet up       · Take prenatal vitamins as directed  Your need for certain vitamins and minerals, such as folic acid, increases during pregnancy  Prenatal vitamins provide some of the extra vitamins and minerals you need  Prenatal vitamins may also help to decrease the risk of certain birth defects  · Talk to your healthcare provider about exercise  Moderate exercise can help you stay fit  Your healthcare provider will help you plan an exercise program that is safe for you during pregnancy  · Do not smoke  If you smoke, it is never too late to quit  Smoking increases your risk of a miscarriage and other health problems during your pregnancy  Smoking can cause your baby to be born too early or weigh less at birth  Ask your healthcare provider for information if you need help quitting  · Do not drink alcohol  Alcohol passes from your body to your baby through the placenta  It can affect your baby's brain development and cause fetal alcohol syndrome (FAS)  FAS is a group of conditions that causes mental, behavior, and growth problems  · Talk to your healthcare provider before you take any medicines  Many medicines may harm your baby if you take them when you are pregnant  Do not take any medicines, vitamins, herbs, or supplements without first talking to your healthcare provider  Never use illegal or street drugs (such as marijuana or cocaine) while you are pregnant  Safety tips during pregnancy:   · Avoid hot tubs and saunas  Do not use a hot tub or sauna while you are pregnant, especially during your first trimester  Hot tubs and saunas may raise your baby's temperature and increase the risk of birth defects  · Avoid toxoplasmosis  This is an infection caused by eating raw meat or being around infected cat feces  It can cause birth defects, miscarriages, and other problems  Wash your hands after you touch raw meat  Make sure any meat is well-cooked before you eat it  Avoid raw eggs and unpasteurized milk   Use gloves or ask someone else to clean your cat's litter box while you are pregnant  Changes that are happening with your baby:  By 34 weeks, your baby may weigh more than 5 pounds  Your baby will be about 12 ½ inches long from the top of the head to the rump (baby's bottom)  Your baby is gaining about ½ pound a week  Your baby's eyes open and close now  Your baby's kicks and movements are more forceful at this time  What you need to know about prenatal care: Your healthcare provider will check your blood pressure and weight  You may also need the following:  · A urine test  may also be done to check for sugar and protein  These can be signs of gestational diabetes or infection  Protein in your urine may also be a sign of preeclampsia  Preeclampsia is a condition that can develop during week 20 or later of your pregnancy  It causes high blood pressure, and it can cause problems with your kidneys and other organs  · A Tdap vaccine  may be recommended by your healthcare provider  · Fundal height  is a measurement of your uterus to check your baby's growth  This number is usually the same as the number of weeks that you have been pregnant  Your healthcare provider may also check your baby's position  · Your baby's heart rate  will be checked  © 2017 [employer]  Information is for End User's use only and may not be sold, redistributed or otherwise used for commercial purposes  All illustrations and images included in CareNotes® are the copyrighted property of [employer]  or [employer]  The above information is an  only  It is not intended as medical advice for individual conditions or treatments  Talk to your doctor, nurse or pharmacist before following any medical regimen to see if it is safe and effective for you

## 2018-06-29 PROBLEM — Z3A.32 32 WEEKS GESTATION OF PREGNANCY: Status: ACTIVE | Noted: 2018-04-05

## 2018-06-29 NOTE — PROGRESS NOTES
Pt feels good fetal movement, denies any leakage of fluid or vaginal bleeding  Denies any uterine contractions  Has received Tdap  Is on iron for anemia  She states she drives to [place] every 2 weeks so her other child can visit with the father  She reports the drive causes her to feel stressed and worried she might get stuck in traffic as she progresses in her pregnancy and she feels anxious when driving especially at night when she is coming back to [place]  She states will talk to the father to see if he can bring the child here instead        59-year-old  0-1 at 32 weeks and 4 days  Anemia-continue with supplements  Check-in card given  Reviewed precautions  Return to office in 2 weeks

## 2018-07-09 ENCOUNTER — ROUTINE PRENATAL (OUTPATIENT)
Dept: OBGYN CLINIC | Facility: CLINIC | Age: 28
End: 2018-07-09
Payer: COMMERCIAL

## 2018-07-09 VITALS
HEART RATE: 105 BPM | DIASTOLIC BLOOD PRESSURE: 72 MMHG | SYSTOLIC BLOOD PRESSURE: 106 MMHG | BODY MASS INDEX: 22.93 KG/M2 | HEIGHT: 62 IN | WEIGHT: 124.6 LBS

## 2018-07-09 DIAGNOSIS — O99.013 ANEMIA DURING PREGNANCY IN THIRD TRIMESTER: Primary | ICD-10-CM

## 2018-07-09 DIAGNOSIS — Z3A.34 34 WEEKS GESTATION OF PREGNANCY: ICD-10-CM

## 2018-07-09 PROCEDURE — 99213 OFFICE O/P EST LOW 20 MIN: CPT | Performed by: NURSE PRACTITIONER

## 2018-07-09 NOTE — PATIENT INSTRUCTIONS
Pregnancy at 28 to 38 Weeks   AMBULATORY CARE:   What changes are happening to your body: You are considered full term at the beginning of 37 weeks  Your breathing may be easier if your baby has moved down into a head-down position  You may need to urinate more often because the baby may be pressing on your bladder  You may also feel more discomfort and get tired easily  Seek care immediately if:   · You develop a severe headache that does not go away  · You have new or increased vision changes, such as blurred or spotted vision  · You have new or increased swelling in your face or hands  · You have vaginal spotting or bleeding  · Your water broke or you feel warm water gushing or trickling from your vagina  Contact your healthcare provider if:   · You have more than 5 contractions in 1 hour  · You notice any changes in your baby's movements  · You have abdominal cramps, pressure, or tightening  · You have a change in vaginal discharge  · You have chills or a fever  · You have vaginal itching, burning, or pain  · You have yellow, green, white, or foul-smelling vaginal discharge  · You have pain or burning when you urinate, less urine than usual, or pink or bloody urine  · You have questions or concerns about your condition or care  How to care for yourself at this stage of your pregnancy:   · Eat a variety of healthy foods  Healthy foods include fruits, vegetables, whole-grain breads, low-fat dairy foods, beans, lean meats, and fish  Drink liquids as directed  Ask how much liquid to drink each day and which liquids are best for you  Limit caffeine to less than 200 milligrams each day  Limit your intake of fish to 2 servings each week  Choose fish low in mercury such as canned light tuna, shrimp, crab, salmon, cod, or tilapia  Do not  eat fish high in mercury such as swordfish, tilefish, zofia mackerel, and shark  · Take prenatal vitamins as directed    Your need for certain vitamins and minerals, such as folic acid, increases during pregnancy  Prenatal vitamins provide some of the extra vitamins and minerals you need  Prenatal vitamins may also help to decrease the risk of certain birth defects  · Rest as needed  Put your feet up if you have swelling in your ankles and feet  · Do not smoke  If you smoke, it is never too late to quit  Smoking increases your risk of a miscarriage and other health problems during your pregnancy  Smoking can cause your baby to be born too early or weigh less at birth  Ask your healthcare provider for information if you need help quitting  · Do not drink alcohol  Alcohol passes from your body to your baby through the placenta  It can affect your baby's brain development and cause fetal alcohol syndrome (FAS)  FAS is a group of conditions that causes mental, behavior, and growth problems  · Talk to your healthcare provider before you take any medicines  Many medicines may harm your baby if you take them when you are pregnant  Do not take any medicines, vitamins, herbs, or supplements without first talking to your healthcare provider  Never use illegal or street drugs (such as marijuana or cocaine) while you are pregnant  · Talk to your healthcare provider before you travel  You may not be able to travel in an airplane after 36 weeks  He may also recommend that you avoid long road trips  Safety tips during pregnancy:   · Avoid hot tubs and saunas  Do not use a hot tub or sauna while you are pregnant, especially during your first trimester  Hot tubs and saunas may raise your baby's temperature and increase the risk of birth defects  · Avoid toxoplasmosis  This is an infection caused by eating raw meat or being around infected cat feces  It can cause birth defects, miscarriages, and other problems  Wash your hands after you touch raw meat  Make sure any meat is well-cooked before you eat it   Avoid raw eggs and unpasteurized milk  Use gloves or ask someone else to clean your cat's litter box while you are pregnant  · Ask your healthcare provider about travel  The most comfortable time to travel is during the second trimester  Ask your healthcare provider if you can travel after 36 weeks  You may not be able to travel in an airplane after 36 weeks  He may also recommend that you avoid long road trips  Changes that are happening with your baby:  By 38 weeks, your baby may weigh between 6 and 9 pounds  Your baby may be about 14 inches long from the top of the head to the rump (baby's bottom)  Your baby hears well enough to know your voice  As your baby gets larger, you may feel fewer kicks and more stretching and rolling  Your baby may move into a head-down position  Your baby will also rest lower in your abdomen  What you need to know about prenatal care: Your healthcare provider will check your blood pressure and weight  You may also need the following:  · A urine test  may also be done to check for sugar and protein  These can be signs of gestational diabetes or infection  Protein in your urine may also be a sign of preeclampsia  Preeclampsia is a condition that can develop during week 20 or later of your pregnancy  It causes high blood pressure, and it can cause problems with your kidneys and other organs  · A blood test  may be done to check for anemia (low iron level)  · A Tdap vaccine  may be recommended by your healthcare provider  · A group B strep test  is a test that is done to check for group B strep infection  Group B strep is a type of bacteria that may be found in the vagina or rectum  It can be passed to your baby during delivery if you have it  Your healthcare provider will take swab your vagina or rectum and send the sample to the lab for tests  · Fundal height  is a measurement of your uterus to check your baby's growth   This number is usually the same as the number of weeks that you have been pregnant  Your healthcare provider may also check your baby's position  · Your baby's heart rate  will be checked  © 2017 [employer] [employer] Information is for End User's use only and may not be sold, redistributed or otherwise used for commercial purposes  All illustrations and images included in CareNotes® are the copyrighted property of [employer]  or [employer]  The above information is an  only  It is not intended as medical advice for individual conditions or treatments  Talk to your doctor, nurse or pharmacist before following any medical regimen to see if it is safe and effective for you

## 2018-07-09 NOTE — PROGRESS NOTES
Feels good FM, denies any LOF, VB or UC's  Denies any concerns      32 yo  at 34 wks 0d  Reviewed precautions  RTO in 2 wks

## 2018-07-26 ENCOUNTER — ROUTINE PRENATAL (OUTPATIENT)
Dept: OBGYN CLINIC | Facility: CLINIC | Age: 28
End: 2018-07-26
Payer: COMMERCIAL

## 2018-07-26 VITALS
HEIGHT: 62 IN | BODY MASS INDEX: 23.19 KG/M2 | WEIGHT: 126 LBS | HEART RATE: 99 BPM | DIASTOLIC BLOOD PRESSURE: 75 MMHG | SYSTOLIC BLOOD PRESSURE: 109 MMHG

## 2018-07-26 DIAGNOSIS — Z34.93 PRENATAL CARE IN THIRD TRIMESTER: Primary | ICD-10-CM

## 2018-07-26 DIAGNOSIS — Z3A.36 36 WEEKS GESTATION OF PREGNANCY: ICD-10-CM

## 2018-07-26 LAB
SL AMB  POCT GLUCOSE, UA: NEGATIVE
SL AMB POCT URINE PROTEIN: NEGATIVE

## 2018-07-26 PROCEDURE — 87653 STREP B DNA AMP PROBE: CPT | Performed by: FAMILY MEDICINE

## 2018-07-26 PROCEDURE — 81002 URINALYSIS NONAUTO W/O SCOPE: CPT | Performed by: OBSTETRICS & GYNECOLOGY

## 2018-07-26 PROCEDURE — 99213 OFFICE O/P EST LOW 20 MIN: CPT | Performed by: OBSTETRICS & GYNECOLOGY

## 2018-07-26 RX ORDER — BREAST PUMP
EACH MISCELLANEOUS AS NEEDED
Qty: 1 EACH | Refills: 0 | Status: SHIPPED | OUTPATIENT
Start: 2018-07-26 | End: 2020-01-28

## 2018-07-26 NOTE — PROGRESS NOTES
OB/GYN  PN Visit  Jhonny Martel  [id number]  7/26/2018  4:04 PM  Dr Mechelle Salazar MD    S: 33 y o  [id number] 36w3d here for PN visit  Expecting a BOY! Liengregg Lester )    OB complaints: Complains of lower back pain for the past 2 weeks  Has to sleep on the couch because the bed is too uncomfortable  Boyfriend periodically messages her back which helps to relieve the pain  Having a boy , Chasity Bonilla     Requesting script for electric breast pump  Ctxns: no  Leakage: no  Bleeding: no  Fetal movement: yes       O:  Vitals:    07/26/18 0830   BP: 109/75   Pulse: 99       Gen: well appearing, no acute distress  OB exam completed: fundal height 36 and     A/P:  #1  36w3d GESTATION  Labor precautions reviewed  Fetal kick counts reviewed  RTC in 1 weeks  GBS collected   Suggest heating pad, firm mattress, and/or OMT for lower back pain  Rx for electric breast provided per pt request     #2   Anemia   Hb 10 8   Continue iron sulfate 325 mg daily with colace 100 mg BID    Future Appointments  Date Time Provider Lilia Marin   8/9/2018 8:15 AM Luciana España MD  7/26/2018  4:04 PM

## 2018-07-28 LAB — GP B STREP DNA SPEC QL NAA+PROBE: NORMAL

## 2018-08-02 ENCOUNTER — ROUTINE PRENATAL (OUTPATIENT)
Dept: OBGYN CLINIC | Facility: CLINIC | Age: 28
End: 2018-08-02
Payer: COMMERCIAL

## 2018-08-02 VITALS
DIASTOLIC BLOOD PRESSURE: 80 MMHG | HEART RATE: 86 BPM | WEIGHT: 127.2 LBS | BODY MASS INDEX: 23.41 KG/M2 | SYSTOLIC BLOOD PRESSURE: 117 MMHG | RESPIRATION RATE: 16 BRPM | HEIGHT: 62 IN

## 2018-08-02 DIAGNOSIS — Z3A.37 37 WEEKS GESTATION OF PREGNANCY: Primary | ICD-10-CM

## 2018-08-02 LAB
SL AMB  POCT GLUCOSE, UA: NEGATIVE
SL AMB POCT URINE PROTEIN: NEGATIVE

## 2018-08-02 PROCEDURE — 81002 URINALYSIS NONAUTO W/O SCOPE: CPT | Performed by: NURSE PRACTITIONER

## 2018-08-02 PROCEDURE — 99213 OFFICE O/P EST LOW 20 MIN: CPT | Performed by: NURSE PRACTITIONER

## 2018-08-02 NOTE — PATIENT INSTRUCTIONS
Pregnancy at 44 to 40 Weeks   AMBULATORY CARE:   What changes are happening to your body: You are now getting close to your due date  Your due date is just an estimate of when your baby will be born  Your baby may be born before or after your due date  Your breathing may be easier if your baby has moved down into a head-down position  You may need to urinate more often because the baby may be pressing on your bladder  You may also feel more discomfort and tire easily  You may also be having trouble sleeping  Seek care immediately if:   · You develop a severe headache that does not go away  · You have new or increased vision changes, such as blurred or spotted vision  · You have new or increased swelling in your face or hands  · You have vaginal spotting or bleeding  · Your water broke or you feel warm water gushing or trickling from your vagina  Contact your healthcare provider if:   · You have more than 5 contractions in 1 hour  · You notice any changes in your baby's movements  · You have abdominal cramps, pressure, or tightening  · You have a change in vaginal discharge  · You have chills or a fever  · You have vaginal itching, burning, or pain  · You have yellow, green, white, or foul-smelling vaginal discharge  · You have pain or burning when you urinate, less urine than usual, or pink or bloody urine  · You have questions or concerns about your condition or care  How to care for yourself at this stage of your pregnancy:   · Eat a variety of healthy foods  Healthy foods include fruits, vegetables, whole-grain breads, low-fat dairy foods, beans, lean meats, and fish  Drink liquids as directed  Ask how much liquid to drink each day and which liquids are best for you  Limit caffeine to less than 200 milligrams each day  Limit your intake of fish to 2 servings each week  Choose fish low in mercury such as canned light tuna, shrimp, crab, salmon, cod, or tilapia   Do not eat fish high in mercury such as swordfish, tilefish, zofia mackerel, and shark  · Take prenatal vitamins as directed  Your need for certain vitamins and minerals, such as folic acid, increases during pregnancy  Prenatal vitamins provide some of the extra vitamins and minerals you need  Prenatal vitamins may also help to decrease the risk of certain birth defects  · Rest as needed  Put your feet up if you have swelling in your ankles and feet  · Do not smoke  If you smoke, it is never too late to quit  Smoking increases your risk of a miscarriage and other health problems during your pregnancy  Smoking can cause your baby to be born too early or weigh less at birth  Ask your healthcare provider for information if you need help quitting  · Do not drink alcohol  Alcohol passes from your body to your baby through the placenta  It can affect your baby's brain development and cause fetal alcohol syndrome (FAS)  FAS is a group of conditions that causes mental, behavior, and growth problems  · Talk to your healthcare provider before you take any medicines  Many medicines may harm your baby if you take them when you are pregnant  Do not take any medicines, vitamins, herbs, or supplements without first talking to your healthcare provider  Never use illegal or street drugs (such as marijuana or cocaine) while you are pregnant  · Talk to your healthcare provider before you travel  You may not be able to travel in an airplane after 36 weeks  He may also recommend that you avoid long road trips  Safety tips during pregnancy:   · Avoid hot tubs and saunas  Do not use a hot tub or sauna while you are pregnant, especially during your first trimester  Hot tubs and saunas may raise your baby's temperature and increase the risk of birth defects  · Avoid toxoplasmosis  This is an infection caused by eating raw meat or being around infected cat feces   It can cause birth defects, miscarriages, and other problems  Wash your hands after you touch raw meat  Make sure any meat is well-cooked before you eat it  Avoid raw eggs and unpasteurized milk  Use gloves or ask someone else to clean your cat's litter box while you are pregnant  · Ask your healthcare provider about travel  The most comfortable time to travel is during the second trimester  Ask your healthcare provider if you can travel after 36 weeks  You may not be able to travel in an airplane after 36 weeks  He may also recommend that you avoid long road trips  Changes that are happening with your baby: Your baby is ready to be born  At birth, your baby may weigh about 6 to 9 pounds and be about 19 to 21 inches long  Your baby may be in a head-down position  Your baby will also rest lower in your abdomen  What you need to know about prenatal care: Your healthcare provider will check your blood pressure and weight  You may also need the following:  · A urine test  may also be done to check for sugar and protein  These can be signs of gestational diabetes or infection  Protein in your urine may also be a sign of preeclampsia  Preeclampsia is a condition that can develop during week 20 or later of your pregnancy  It causes high blood pressure, and it can cause problems with your kidneys and other organs  · Your baby's heart rate  will be checked  © 2017 [employer] Information is for End User's use only and may not be sold, redistributed or otherwise used for commercial purposes  All illustrations and images included in CareNotes® are the copyrighted property of A [employer] M [employer]  or [employer]  The above information is an  only  It is not intended as medical advice for individual conditions or treatments  Talk to your doctor, nurse or pharmacist before following any medical regimen to see if it is safe and effective for you

## 2018-08-02 NOTE — PROGRESS NOTES
Pt reports good FM, denies any LOF, VB or regular UC's  Reviewed GBS was negative  Taking iron for anemia        23-year-old  0-1 at 37 weeks and 3 days  Anemia-continue iron  Reviewed labor precautions  Return to office in 1 week

## 2018-08-14 ENCOUNTER — ROUTINE PRENATAL (OUTPATIENT)
Dept: OBGYN CLINIC | Facility: CLINIC | Age: 28
End: 2018-08-14
Payer: COMMERCIAL

## 2018-08-14 VITALS
WEIGHT: 130.2 LBS | SYSTOLIC BLOOD PRESSURE: 111 MMHG | DIASTOLIC BLOOD PRESSURE: 76 MMHG | BODY MASS INDEX: 23.96 KG/M2 | HEART RATE: 81 BPM | HEIGHT: 62 IN

## 2018-08-14 DIAGNOSIS — Z34.93 PRENATAL CARE IN THIRD TRIMESTER: Primary | ICD-10-CM

## 2018-08-14 LAB
SL AMB  POCT GLUCOSE, UA: NEGATIVE
SL AMB POCT URINE PROTEIN: ABNORMAL

## 2018-08-14 PROCEDURE — 99213 OFFICE O/P EST LOW 20 MIN: CPT | Performed by: FAMILY MEDICINE

## 2018-08-14 PROCEDURE — 81002 URINALYSIS NONAUTO W/O SCOPE: CPT | Performed by: FAMILY MEDICINE

## 2018-08-14 NOTE — PATIENT INSTRUCTIONS
Pregnancy at 44 to 40 Weeks   [address] [id number] :   What changes are happening in my body? You are now getting close to your due date  Your due date is just an estimate of when your baby will be born  Your baby may be born before or after your due date  Your breathing may be easier if your baby has moved down into a head-down position  You may need to urinate more often because the baby may be pressing on your bladder  You may also feel more discomfort and tire easily  You may also be having trouble sleeping  How do I care for myself at this stage of my pregnancy? · Eat a variety of healthy foods  Healthy foods include fruits, vegetables, whole-grain breads, low-fat dairy foods, beans, lean meats, and fish  Drink liquids as directed  Ask how much liquid to drink each day and which liquids are best for you  Limit caffeine to less than 200 milligrams each day  Limit your intake of fish to 2 servings each week  Choose fish low in mercury such as canned light tuna, shrimp, crab, salmon, cod, or tilapia  Do not  eat fish high in mercury such as swordfish, tilefish, zofia mackerel, and shark  · Take prenatal vitamins as directed  Your need for certain vitamins and minerals, such as folic acid, increases during pregnancy  Prenatal vitamins provide some of the extra vitamins and minerals you need  Prenatal vitamins may also help to decrease the risk of certain birth defects  · Rest as needed  Put your feet up if you have swelling in your ankles and feet  · Do not smoke  If you smoke, it is never too late to quit  Smoking increases your risk of a miscarriage and other health problems during your pregnancy  Smoking can cause your baby to be born too early or weigh less at birth  Ask your healthcare provider for information if you need help quitting  · Do not drink alcohol  Alcohol passes from your body to your baby through the placenta   It can affect your baby's brain development and cause fetal alcohol syndrome (FAS)  FAS is a group of conditions that causes mental, behavior, and growth problems  · Talk to your healthcare provider before you take any medicines  Many medicines may harm your baby if you take them when you are pregnant  Do not take any medicines, vitamins, herbs, or supplements without first talking to your healthcare provider  Never use illegal or street drugs (such as marijuana or cocaine) while you are pregnant  · Talk to your healthcare provider before you travel  You may not be able to travel in an airplane after 36 weeks  He may also recommend that you avoid long road trips  What are some safety tips during pregnancy? · Avoid hot tubs and saunas  Do not use a hot tub or sauna while you are pregnant, especially during your first trimester  Hot tubs and saunas may raise your baby's temperature and increase the risk of birth defects  · Avoid toxoplasmosis  This is an infection caused by eating raw meat or being around infected cat feces  It can cause birth defects, miscarriages, and other problems  Wash your hands after you touch raw meat  Make sure any meat is well-cooked before you eat it  Avoid raw eggs and unpasteurized milk  Use gloves or ask someone else to clean your cat's litter box while you are pregnant  · Ask your healthcare provider about travel  The most comfortable time to travel is during the second trimester  Ask your healthcare provider if you can travel after 36 weeks  You may not be able to travel in an airplane after 36 weeks  He may also recommend that you avoid long road trips  What changes are happening with my baby? Your baby is ready to be born  At birth, your baby may weigh about 6 to 9 pounds and be about 19 to 21 inches long  Your baby may be in a head-down position  Your baby will also rest lower in your abdomen  What do I need to know about prenatal care? Your healthcare provider will check your blood pressure and weight   You may also need the following:  · A urine test  may also be done to check for sugar and protein  These can be signs of gestational diabetes or infection  Protein in your urine may also be a sign of preeclampsia  Preeclampsia is a condition that can develop during week 20 or later of your pregnancy  It causes high blood pressure, and it can cause problems with your kidneys and other organs  · Your baby's heart rate  will be checked  When should I seek immediate care? · You develop a severe headache that does not go away  · You have new or increased vision changes, such as blurred or spotted vision  · You have new or increased swelling in your face or hands  · You have vaginal spotting or bleeding  · Your water broke or you feel warm water gushing or trickling from your vagina  When should I contact my healthcare provider? · You have more than 5 contractions in 1 hour  · You notice any changes in your baby's movements  · You have abdominal cramps, pressure, or tightening  · You have a change in vaginal discharge  · You have chills or a fever  · You have vaginal itching, burning, or pain  · You have yellow, green, white, or foul-smelling vaginal discharge  · You have pain or burning when you urinate, less urine than usual, or pink or bloody urine  · You have questions or concerns about your condition or care  CARE AGREEMENT:   You have the right to help plan your care  Learn about your health condition and how it may be treated  Discuss treatment options with your caregivers to decide what care you want to receive  You always have the right to refuse treatment  The above information is an  only  It is not intended as medical advice for individual conditions or treatments  Talk to your doctor, nurse or pharmacist before following any medical regimen to see if it is safe and effective for you    © 2017 [employer] Information is for End User's use only and may not be sold, redistributed or otherwise used for commercial purposes  All illustrations and images included in CareNotes® are the copyrighted property of A [employer] M [employer] Inc  or [employer]

## 2018-08-14 NOTE — PROGRESS NOTES
32 yo -0-2-1 at 39w 1d here for prenatal care  BERONICA 18  Denies VB, discharge, LOF, contractions  Feeling good fetal movement  C/o low back pain, improved with putting 4 pillows on her back as sleeping  Otherwise, happy and no complaints  GBS (-)    Routine prenatal care  RTC in 1 week

## 2018-08-14 NOTE — LETTER
August 14, 2018     Patient: John Crawley   YOB: 1990   Date of Visit: 8/14/2018       To Whom it May Concern:    John Crawley is under my professional care  She was seen in my office on 8/14/2018 with Radha Rogers  He may return to work   If you have any questions or concerns, please don't hesitate to call           Sincerely,          Resident Carmel Giordano        CC: No Recipients

## 2018-08-20 ENCOUNTER — HOSPITAL ENCOUNTER (INPATIENT)
Facility: HOSPITAL | Age: 28
LOS: 2 days | Discharge: HOME | DRG: 560 | End: 2018-08-22
Attending: OBSTETRICS & GYNECOLOGY | Admitting: OBSTETRICS & GYNECOLOGY
Payer: COMMERCIAL

## 2018-08-20 DIAGNOSIS — Z3A.40 40 WEEKS GESTATION OF PREGNANCY: Primary | ICD-10-CM

## 2018-08-20 PROBLEM — Z3A.34 34 WEEKS GESTATION OF PREGNANCY: Status: RESOLVED | Noted: 2018-04-05 | Resolved: 2018-08-20

## 2018-08-20 PROBLEM — O21.9 NAUSEA AND VOMITING IN PREGNANCY: Status: RESOLVED | Noted: 2018-01-22 | Resolved: 2018-08-20

## 2018-08-20 LAB
ABO GROUP BLD: NORMAL
BASE EXCESS BLDCOA CALC-SCNC: -1.8 MMOL/L (ref 3–11)
BASE EXCESS BLDCOV CALC-SCNC: -2.8 MMOL/L (ref 1–9)
BASOPHILS # BLD AUTO: 0.04 THOUSANDS/ΜL (ref 0–0.1)
BASOPHILS NFR BLD AUTO: 0 % (ref 0–1)
BLD GP AB SCN SERPL QL: NEGATIVE
EOSINOPHIL # BLD AUTO: 0.02 THOUSAND/ΜL (ref 0–0.61)
EOSINOPHIL NFR BLD AUTO: 0 % (ref 0–6)
ERYTHROCYTE [DISTWIDTH] IN BLOOD BY AUTOMATED COUNT: 13.9 % (ref 11.6–15.1)
HCO3 BLDCOA-SCNC: 22.2 MMOL/L (ref 17.3–27.3)
HCO3 BLDCOV-SCNC: 21.6 MMOL/L (ref 12.2–28.6)
HCT VFR BLD AUTO: 38.8 % (ref 34.8–46.1)
HGB BLD-MCNC: 12.3 G/DL (ref 11.5–15.4)
IMM GRANULOCYTES # BLD AUTO: 0.14 THOUSAND/UL (ref 0–0.2)
IMM GRANULOCYTES NFR BLD AUTO: 1 % (ref 0–2)
LYMPHOCYTES # BLD AUTO: 2.15 THOUSANDS/ΜL (ref 0.6–4.47)
LYMPHOCYTES NFR BLD AUTO: 19 % (ref 14–44)
MCH RBC QN AUTO: 26.3 PG (ref 26.8–34.3)
MCHC RBC AUTO-ENTMCNC: 31.7 G/DL (ref 31.4–37.4)
MCV RBC AUTO: 83 FL (ref 82–98)
MONOCYTES # BLD AUTO: 0.79 THOUSAND/ΜL (ref 0.17–1.22)
MONOCYTES NFR BLD AUTO: 7 % (ref 4–12)
NEUTROPHILS # BLD AUTO: 8.08 THOUSANDS/ΜL (ref 1.85–7.62)
NEUTS SEG NFR BLD AUTO: 73 % (ref 43–75)
NRBC BLD AUTO-RTO: 0 /100 WBCS
O2 CT VFR BLDCOA CALC: 16.9 ML/DL
OXYHGB MFR BLDCOA: 72.2 %
OXYHGB MFR BLDCOV: 71.2 %
PCO2 BLDCOA: 36 MM[HG] (ref 30–60)
PCO2 BLDCOV: 37 MM HG (ref 27–43)
PH BLDCOA: 7.41 [PH] (ref 7.23–7.43)
PH BLDCOV: 7.38 [PH] (ref 7.19–7.49)
PLATELET # BLD AUTO: 300 THOUSANDS/UL (ref 149–390)
PMV BLD AUTO: 11.4 FL (ref 8.9–12.7)
PO2 BLDCOA: 29.4 MM HG (ref 5–25)
PO2 BLDCOV: 29.2 MM HG (ref 15–45)
RBC # BLD AUTO: 4.67 MILLION/UL (ref 3.81–5.12)
RH BLD: POSITIVE
SAO2 % BLDCOV: 16.6 ML/DL
SPECIMEN EXPIRATION DATE: NORMAL
WBC # BLD AUTO: 11.22 THOUSAND/UL (ref 4.31–10.16)

## 2018-08-20 PROCEDURE — 99214 OFFICE O/P EST MOD 30 MIN: CPT

## 2018-08-20 PROCEDURE — 76815 OB US LIMITED FETUS(S): CPT | Performed by: FAMILY MEDICINE

## 2018-08-20 PROCEDURE — 86592 SYPHILIS TEST NON-TREP QUAL: CPT | Performed by: OBSTETRICS & GYNECOLOGY

## 2018-08-20 PROCEDURE — 85025 COMPLETE CBC W/AUTO DIFF WBC: CPT | Performed by: OBSTETRICS & GYNECOLOGY

## 2018-08-20 PROCEDURE — 59409 OBSTETRICAL CARE: CPT | Performed by: OBSTETRICS & GYNECOLOGY

## 2018-08-20 PROCEDURE — 86900 BLOOD TYPING SEROLOGIC ABO: CPT | Performed by: OBSTETRICS & GYNECOLOGY

## 2018-08-20 PROCEDURE — 82805 BLOOD GASES W/O2 SATURATION: CPT | Performed by: FAMILY MEDICINE

## 2018-08-20 PROCEDURE — 86901 BLOOD TYPING SEROLOGIC RH(D): CPT | Performed by: OBSTETRICS & GYNECOLOGY

## 2018-08-20 PROCEDURE — 86850 RBC ANTIBODY SCREEN: CPT | Performed by: OBSTETRICS & GYNECOLOGY

## 2018-08-20 RX ORDER — OXYTOCIN/RINGER'S LACTATE 30/500 ML
PLASTIC BAG, INJECTION (ML) INTRAVENOUS
Status: COMPLETED
Start: 2018-08-20 | End: 2018-08-20

## 2018-08-20 RX ORDER — SODIUM CHLORIDE, SODIUM LACTATE, POTASSIUM CHLORIDE, CALCIUM CHLORIDE 600; 310; 30; 20 MG/100ML; MG/100ML; MG/100ML; MG/100ML
125 INJECTION, SOLUTION INTRAVENOUS CONTINUOUS
Status: DISCONTINUED | OUTPATIENT
Start: 2018-08-20 | End: 2018-08-22 | Stop reason: HOSPADM

## 2018-08-20 RX ORDER — ONDANSETRON 2 MG/ML
4 INJECTION INTRAMUSCULAR; INTRAVENOUS EVERY 6 HOURS PRN
Status: DISCONTINUED | OUTPATIENT
Start: 2018-08-20 | End: 2018-08-22 | Stop reason: HOSPADM

## 2018-08-20 RX ORDER — KETOROLAC TROMETHAMINE 30 MG/ML
30 INJECTION, SOLUTION INTRAMUSCULAR; INTRAVENOUS EVERY 6 HOURS PRN
Status: DISCONTINUED | OUTPATIENT
Start: 2018-08-20 | End: 2018-08-22 | Stop reason: HOSPADM

## 2018-08-20 RX ORDER — ACETAMINOPHEN 325 MG/1
650 TABLET ORAL EVERY 6 HOURS PRN
Status: DISCONTINUED | OUTPATIENT
Start: 2018-08-20 | End: 2018-08-22 | Stop reason: HOSPADM

## 2018-08-20 RX ORDER — ONDANSETRON 2 MG/ML
4 INJECTION INTRAMUSCULAR; INTRAVENOUS EVERY 8 HOURS PRN
Status: DISCONTINUED | OUTPATIENT
Start: 2018-08-20 | End: 2018-08-20 | Stop reason: SDUPTHER

## 2018-08-20 RX ORDER — SODIUM CHLORIDE, SODIUM LACTATE, POTASSIUM CHLORIDE, CALCIUM CHLORIDE 600; 310; 30; 20 MG/100ML; MG/100ML; MG/100ML; MG/100ML
125 INJECTION, SOLUTION INTRAVENOUS CONTINUOUS
Status: DISCONTINUED | OUTPATIENT
Start: 2018-08-20 | End: 2018-08-20

## 2018-08-20 RX ORDER — CALCIUM CARBONATE 200(500)MG
1000 TABLET,CHEWABLE ORAL DAILY PRN
Status: DISCONTINUED | OUTPATIENT
Start: 2018-08-20 | End: 2018-08-22 | Stop reason: HOSPADM

## 2018-08-20 RX ORDER — IBUPROFEN 600 MG/1
600 TABLET ORAL EVERY 6 HOURS PRN
Status: DISCONTINUED | OUTPATIENT
Start: 2018-08-20 | End: 2018-08-22 | Stop reason: HOSPADM

## 2018-08-20 RX ORDER — DOCUSATE SODIUM 100 MG/1
100 CAPSULE, LIQUID FILLED ORAL 2 TIMES DAILY
Status: DISCONTINUED | OUTPATIENT
Start: 2018-08-20 | End: 2018-08-22 | Stop reason: HOSPADM

## 2018-08-20 RX ORDER — DIAPER,BRIEF,INFANT-TODD,DISP
EACH MISCELLANEOUS 4 TIMES DAILY PRN
Status: DISCONTINUED | OUTPATIENT
Start: 2018-08-20 | End: 2018-08-22 | Stop reason: HOSPADM

## 2018-08-20 RX ORDER — DIPHENHYDRAMINE HCL 25 MG
25 TABLET ORAL EVERY 6 HOURS PRN
Status: DISCONTINUED | OUTPATIENT
Start: 2018-08-20 | End: 2018-08-22 | Stop reason: HOSPADM

## 2018-08-20 RX ADMIN — WITCH HAZEL 1 PAD: 500 SOLUTION RECTAL; TOPICAL at 11:47

## 2018-08-20 RX ADMIN — SODIUM CHLORIDE, SODIUM LACTATE, POTASSIUM CHLORIDE, AND CALCIUM CHLORIDE 125 ML/HR: .6; .31; .03; .02 INJECTION, SOLUTION INTRAVENOUS at 08:16

## 2018-08-20 RX ADMIN — IBUPROFEN 600 MG: 600 TABLET ORAL at 19:44

## 2018-08-20 RX ADMIN — HYDROCORTISONE: 1 CREAM TOPICAL at 11:47

## 2018-08-20 RX ADMIN — IBUPROFEN 600 MG: 600 TABLET ORAL at 11:46

## 2018-08-20 RX ADMIN — BENZOCAINE AND LEVOMENTHOL: 200; 5 SPRAY TOPICAL at 11:47

## 2018-08-20 RX ADMIN — DOCUSATE SODIUM 100 MG: 100 CAPSULE, LIQUID FILLED ORAL at 17:04

## 2018-08-20 RX ADMIN — Medication 30 UNITS: at 08:42

## 2018-08-20 NOTE — H&P
Niranjan [age] y o  female MRN: [id number]  Unit/Bed#: LD Triage  Encounter: [id number]    Assessment: 33 y o  [id number] at 40w0d admitted for labor   Plan:   · Admit  · CBC, RPR, Type & Screen  · Analgesia at patient request  Patient refused Epidural    Dr Francheska Fulton aware    Prenatal Labs: reviewed     Blood type: O  Rh factor: Positive  Antibody: Negative  HIV: non-reactive  Rubella: Immune  VDRL/RPR: Non reactive  HBsAg: Negative  Chlamydia: Negative  Gonorrhea: Negative  Diabetes 1 hour screen: 124  Platelets: 731  Hgb: 10 8    US: (18)  Placenta Anterior  No previa  TIM: 19 6    US: (5/3/18)  Mild polyhydraminos    ___________________________________________________________    Chief Complaint: contractions and leaking fluid    HPI: Maude Dill is a 33 y o  [id number] with an BERONICA of 2018, by Ultrasound at 40w0d who is being admitted for labor   She complains of uterine contractions, occurring every 2 minutes, has no LOF, and reports spotting  She states she has felt good FM  Patient Active Problem List   Diagnosis    34 weeks gestation of pregnancy    Rosacea    Nausea and vomiting in pregnancy    Anemia during pregnancy in third trimester       Baby complications/comments: none    Review of Systems    OB History    Para Term  AB Living   4 1 1 0 2 1   SAB TAB Ectopic Multiple Live Births   1 1 0 0 1      # Outcome Date GA Lbr Ras/2nd Weight Sex Delivery Anes PTL Lv   4 Current            3 TAB            2 SAB            1 Term                   No past medical history on file      Past Surgical History:   Procedure Laterality Date    RHINOPLASTY         Social History   Substance Use Topics    Smoking status: Never Smoker    Smokeless tobacco: Never Used    Alcohol use No       Allergies   Allergen Reactions    Dust Mite Extract     Pollen Extract     Tree Extract        Prescriptions Prior to Admission   Medication    ferrous sulfate 324 (65 Fe) mg    Misc  Devices (BREAST PUMP) MISC    Prenatal Vit-Fe Fumarate-FA (PRENATAL VITAMIN) 27-0 8 MG TABS       Objective:  HR:  [78] 78  Resp:  [16] 16  BP: (129)/(86) 129/86  Body mass index is 23 78 kg/m²  Physical Exam:  General: No acute distress  Heart: Regular rate & rhythm  No murmurs/clicks/gallops  Lungs: Clear bilaterally  Normal effort  No wheezes/rales/rhonchi  Abdominal: Soft  NT/ND  Gravid  Extremities: No TTP  Lower limbs symmetric bilaterally       : No pooling, No VB,   SVE: 8/90/0 Anterior       FHT:       TOCO:   Contraction Quality: Moderate    Lab Results   Component Value Date    WBC 7 82 06/02/2018    HGB 10 8 (L) 06/02/2018    HCT 33 2 (L) 06/02/2018     06/02/2018     No results found for: NA, K, CL, CO2, BUN, CREATININE, GLUCOSE, AST, ALT          Signature/Title: Juli Mcnally MD  Date: 8/20/2018  Time: 5:55 AM

## 2018-08-20 NOTE — LACTATION NOTE
This note was copied from a baby's chart  Discussed 2nd night syndrome and ways to calm infant  Hand out given  Information on hand expression given  Discussed benefits of knowing how to manually express breast including stimulating milk supply, softening nipple for latch and evacuating breast in the event of engorgement  Met with mother  Provided mother with Ready, Set, Baby booklet  Discussed Skin to Skin contact an benefits to mom and baby  Talked about the delay of the first bath until baby has adjusted  Spoke about the benefits of rooming in  Feeding on cue and what that means for recognizing infant's hunger  Avoidance of pacifiers for the first month discussed  Talked about exclusive breastfeeding for the first 6 months  Positioning and latch reviewed as well as showing images of other feeding positions  Discussed the properties of a good latch in any position  Reviewed hand/manual expression  Discussed s/s that baby is getting enough milk and some s/s that breastfeeding dyad may need further help  Gave information on common concerns, what to expect the first few weeks after delivery, preparing for other caregivers, and how partners can help  Resources for support also provided  Encouraged mom to watch breastfeeding class in the education area of My Chart Bedside  Powerpoint givenbreastfeeding class at patient request     Encouraged MOB to call for assistance, questions, and concerns about breastfeeding  Extension provided

## 2018-08-20 NOTE — PROGRESS NOTES
OB Triage Note  Maude Dill 33 y o  female MRN: [id number]  Unit/Bed#: LD Triage 4     BERONICA: Estimated Date of Delivery: 18    HPI: 33 y o   at 40w0d with c/o contractions and leaking fluid  She  complains of uterine contractions, occurring every 5 minutes, light LOF around 3:40 am, and no VB  She states she has felt good FM  OB Cx:  Anemia  Mild Polyhydramnios ( US: 5/3/18)    FHT: Baseline 135  Minimal variability       Vitals:   HR:  [78] 78  Resp:  [16-18] 16  BP: (129)/(86) 129/86      Physical Exam:  General: AAOX3  No acute distress   Abdominal: Soft  NT/ND  Gravid  Estremities: No edema  : No pooling,  Scant VB  Wet: Neg   KOH: Neg   Ferning: Negative  Nitrazine test: Positive (Blood)       SVE:  Dilation: 3-4  Effacement (%): 70  Station: -1  Cervical Characteristics: Anterior    Ultrasound: see fetal testing note      A/P: [id number] at 40w0d here for rule out labor  and spontaneous rupture of membranes  1  R/O rupture/ labor:  Neg ferning, no pooling  TIM 10 49  - Given [place]: minimal variables: recheck in 2 hours        Dr Francheska Ronquillo  Signature/Title: Mario Fulton MD  Date: 2018  Time: 6:36 AM

## 2018-08-20 NOTE — DISCHARGE SUMMARY
Discharge Summary - Enriqueta Dash 33 y o  female MRN: [id number]    Unit/Bed#: -01 Encounter: [id number]    Admission Date: 2018     Discharge Date: 18    Attending: Jasmin Sepulveda MD    Principal Diagnosis: Encounter for full-term uncomplicated delivery [K28]  Premature rupture of membranes, unspecified as to length of time between rupture and onset of labor, unspecified weeks of gestation [O42 90]  40 weeks gestation of pregnancy [Z3A 40]    Secondary Diagnosis:    Procedures: spontaneous vaginal delivery    Hospital Course:     Enriqueta Dash is a 33 y o  [id number] at 40w0d wks who was initially admitted for Labor  She delivered a viable male  on 18 at 46am    Weight 7 lbs 11oz via spontaneous vaginal delivery  Apgars were 9 (1 min) and 9 (5 min)   was transferred to  nursery  Patient tolerated the procedure well and was transferred to recovery in stable condition  Her post-partum course was not complicated  Predelivery hemoglobin was 12 3  Her postdelivery pain was well controlled with oral analgesics  On day of discharge, she was ambulating and able to reasonably perform all ADLs  She was voiding and had appropriate bowel function  Pain was well controlled  She was discharged home on post-delivery day #2 without complications  Patient was instructed to follow up with her OB as an outpatient and was given appropriate warnings to call provider if she develops signs of infection or uncontrolled pain  Complications: none apparent    Condition at discharge: good      Discharge Medications:   Prenatal vitamin daily for 6 months or the duration of nursing whichever is longer    Motrin 600 mg orally every 6 hours as needed for pain  Tylenol (over the counter) per bottle directions as needed for pain  Hydrocortisone cream 1% (over the counter) applied 1-2x daily to hemorrhoids as needed  Witch hazel pads for hemorrhoidal discomfort as needed  Sodium docusate capsule 100 mg    Discharge instructions: See after visit summary for complete information  Do not place anything (no partner, tampons or douche) in your vagina for 6 weeks  You may walk for exercise for the first 6 weeks then gradually return to your usual activities     Please do not drive for 1 week if you have no stitches and for 2 weeks if you have stitches or underwent a  delivery     You may take baths or shower per your preference     Please look at your bust (breasts) in the mirror daily and call for redness or tenderness or increased warmth     If you have had a  please look at your incision daily as well and call us for increasing redness or steady drainage from the incision     Please call us for temperature > 100 4*F or 38* C, worsening pain or a foul discharge    [facility]    Disposition: Home    Planned Readmission: No

## 2018-08-20 NOTE — L&D DELIVERY NOTE
DELIVERY NOTE  Enriqueta Dash 33 y o  female MRN: [id number]  Unit/Bed#:  308-01 Encounter: [id number]    Obstetrician:   Dr Maurice Lama    Assistant: Dr Lorri Rios, Dr Lori Pierce    Pre-Delivery Diagnosis: Term pregnancy  Single fetus    Post-Delivery Diagnosis: Same as above - Delivered    Procedure: Spontaneous vaginal delivery    Indications for instrumental delivery: None  Prolonged second stage of labor    Estimated Blood Loss:  300 ml           Complications:  None    Description of Delivery: The patient pushed for 3 minutes and delivered a viable male  over an intact perineum  The  neck was checked for a nuchal cord and none was palpated  The anterior shoulder was delivered with gentle downward traction and maternal explusive efforts  The remained of the  was delivered without difficulty and placed on the maternal abdomen  The umbilical cord was doubly clamped and cut, and the  was passed off to  staff for routine care  Umbilical cord blood, umbilical artery and umbilical venous gases were collected  Active management of the third stage of labor was undertaken with IV pitocin administration  The placenta delivered shortly thereafter with gentle uterine massage and steady downward cord traction  Bleeding was noted to be under control  The vagina, perineum, and rectum was inspected for laceration  None was noted  She had mild abrasions in the vaginal wall  No bleeding  The patient tolerated the procedure well  At the conclusion of the procedure, all needle, sponge, and instrument counts were noted to be correct  Patient tolerated the procedure well and was allowed to recover in labor and delivery room with family and  before being transferred to the post-partum floor  Dr Maurice Lama was present and participated in all key portions of the case  Disposition: Mother and baby are currently recovering nicely in stable condition      Servando MD           Attending Attestation: I was present for the entire procedure    Valente Sandifer, MD  8/20/2018  8:53 AM

## 2018-08-21 LAB — RPR SER QL: NORMAL

## 2018-08-21 PROCEDURE — 99024 POSTOP FOLLOW-UP VISIT: CPT | Performed by: OBSTETRICS & GYNECOLOGY

## 2018-08-21 RX ADMIN — DOCUSATE SODIUM 100 MG: 100 CAPSULE, LIQUID FILLED ORAL at 18:07

## 2018-08-21 RX ADMIN — IBUPROFEN 600 MG: 600 TABLET ORAL at 02:39

## 2018-08-21 RX ADMIN — IBUPROFEN 600 MG: 600 TABLET ORAL at 21:11

## 2018-08-21 RX ADMIN — DOCUSATE SODIUM 100 MG: 100 CAPSULE, LIQUID FILLED ORAL at 09:09

## 2018-08-21 RX ADMIN — IBUPROFEN 600 MG: 600 TABLET ORAL at 09:08

## 2018-08-21 NOTE — LACTATION NOTE
This note was copied from a baby's chart  Jaylan Grajeda says breast feeding is going well, just less colostrum than when she learned to hand express yesterday  Encouraged Jaylan Grajeda to call for assistance as needed  Extension provided on white communication board

## 2018-08-21 NOTE — PROGRESS NOTES
Postpartum Progress Note       PROCEDURE: Spontaneous Vaginal Delivery    SUBJECTIVE:    Pain: yes, controlled with motrin    Tolerating Oral Intake: yes     Voiding: yes    Flatus: yes    Bowel Movement: no    Ambulating: yes    Breastfeeding: yes    Chest Pain: no    Shortness of Breath: no    Leg Pain/Discomfort: no    Lochia: moderate    Other: Patient comfortable at bedside        OBJECTIVE:    General: No Acute Distress     Cardiovascular: Regular, Rate and Rhythm, no murmur, gallop or rub     Lungs: Clear to Auscultation Bilaterally, no wheezing, rhonchi or rales     Abdomen: Soft, non-distended, non-tender, no rebound, guarding or CVA tenderness     Fundus: Firm & Non-Tender     Fundal Location:    -1 cm below the umbilicus    Lower Extremities: Non-Tender, no edema    Vitals:    08/21/18 0425   BP: 109/54   Pulse: 90   Resp: 18   Temp: 98 3 °F (36 8 °C)   SpO2:          Results from last 7 days  Lab Units 08/20/18  0805   WBC Thousand/uL 11 22*   HEMOGLOBIN g/dL 12 3   HEMATOCRIT % 38 8   PLATELETS Thousands/uL 300   NEUTROS PCT % 73   MONOS PCT % 7           Invalid input(s): LABALBU      No results found for: PHOS           No results found for: TROPONINI  ABG:No results found for: PHART, FDV3HAK, PO2ART, ONN2HYW, D5LRDNFN, BEART, SOURCE  LABS / TESTS / MEDICATION:      Admission on 08/20/2018   Component Date Value    ABO Grouping 08/20/2018 O     Rh Factor 08/20/2018 Positive     Antibody Screen 08/20/2018 Negative     Specimen Expiration Date 08/20/2018 [id number]     WBC 08/20/2018 11 22*    RBC 08/20/2018 4 67     Hemoglobin 08/20/2018 12 3     Hematocrit 08/20/2018 38 8     MCV 08/20/2018 83     MCH 08/20/2018 26 3*    MCHC 08/20/2018 31 7     RDW 08/20/2018 13 9     MPV 08/20/2018 11 4     Platelets 73/81/5861 300     nRBC 08/20/2018 0     Neutrophils Relative 08/20/2018 73     Immat GRANS % 08/20/2018 1     Lymphocytes Relative 08/20/2018 19     Monocytes Relative 08/20/2018 7  Eosinophils Relative 2018 0     Basophils Relative 2018 0     Neutrophils Absolute 2018 8 08*    Immature Grans Absolute 2018 0 14     Lymphocytes Absolute 2018 2 15     Monocytes Absolute 2018 0 79     Eosinophils Absolute 2018 0 02     Basophils Absolute 2018 0 04     pH, Cord Art 2018 7 408     pCO2, Cord Art 2018 36 0     pO2, Cord Art 2018 29 4*    HCO3, Cord Art 2018 22 2     Base Exc, Cord Art 2018 -1 8*    O2 Content, Cord Art 2018 16 9     O2 Hgb, Arterial Cord 2018 72 2     pH, Cord Tomi 2018 7 384     pCO2, Cord Tomi 2018 37 0     pO2, Cord Tomi 2018 29 2     HCO3, Cord Tomi 2018 21 6     Base Exc, Cord Tomi 2018 -2 8*    O2 Cont, Cord Tomi 2018 16 6     O2 HGB,VENOUS CORD 2018 71 2        Current Facility-Administered Medications   Medication Dose Route Frequency    acetaminophen (TYLENOL) tablet 650 mg  650 mg Oral Q6H PRN    benzocaine-menthol-lanolin-aloe (DERMOPLAST) 20-0 5 % topical spray   Topical 4x Daily PRN    calcium carbonate (TUMS) chewable tablet 1,000 mg  1,000 mg Oral Daily PRN    diphenhydrAMINE (BENADRYL) tablet 25 mg  25 mg Oral Q6H PRN    docusate sodium (COLACE) capsule 100 mg  100 mg Oral BID    hydrocortisone 1 % cream   Topical 4x Daily PRN    ibuprofen (MOTRIN) tablet 600 mg  600 mg Oral Q6H PRN    ketorolac (TORADOL) injection 30 mg  30 mg Intravenous Q6H PRN    lactated ringers infusion  125 mL/hr Intravenous Continuous    ondansetron (ZOFRAN) injection 4 mg  4 mg Intravenous Q6H PRN    witch hazel-glycerin (TUCKS) topical pad 1 pad  1 pad Topical Q4H PRN       ASSESSMENT AND PLAN:   Postpartum day # 1   Status post:   1  Continue Routine Postpartum Care  2  Encourage Ambulation  3  Advance diet as tolerated  4  Pain control with motrin and tylenol PRN  5   Plan Contraception discussed: Depo-Provera, will discuss at EOB clinic to have a TL during this year    Signature/Title: Vanesa Gardner MD  Date: 8/21/2018  Time: 6:24 AM

## 2018-08-22 VITALS
WEIGHT: 130 LBS | OXYGEN SATURATION: 100 % | HEIGHT: 62 IN | TEMPERATURE: 98.3 F | SYSTOLIC BLOOD PRESSURE: 111 MMHG | DIASTOLIC BLOOD PRESSURE: 75 MMHG | BODY MASS INDEX: 23.92 KG/M2 | RESPIRATION RATE: 18 BRPM | HEART RATE: 98 BPM

## 2018-08-22 PROCEDURE — 99024 POSTOP FOLLOW-UP VISIT: CPT | Performed by: OBSTETRICS & GYNECOLOGY

## 2018-08-22 RX ORDER — ACETAMINOPHEN 325 MG/1
650 TABLET ORAL EVERY 6 HOURS PRN
Qty: 30 TABLET | Refills: 0
Start: 2018-08-22 | End: 2020-01-28

## 2018-08-22 RX ORDER — IBUPROFEN 600 MG/1
600 TABLET ORAL EVERY 6 HOURS PRN
Qty: 30 TABLET | Refills: 0
Start: 2018-08-22 | End: 2020-01-28

## 2018-08-22 RX ORDER — DOCUSATE SODIUM 100 MG/1
100 CAPSULE, LIQUID FILLED ORAL 2 TIMES DAILY
Qty: 10 CAPSULE | Refills: 0
Start: 2018-08-22 | End: 2018-09-10

## 2018-08-22 RX ORDER — MEDROXYPROGESTERONE ACETATE 150 MG/ML
150 INJECTION, SUSPENSION INTRAMUSCULAR ONCE
Status: DISCONTINUED | OUTPATIENT
Start: 2018-08-22 | End: 2018-08-22 | Stop reason: HOSPADM

## 2018-08-22 RX ADMIN — DOCUSATE SODIUM 100 MG: 100 CAPSULE, LIQUID FILLED ORAL at 08:08

## 2018-08-22 RX ADMIN — IBUPROFEN 600 MG: 600 TABLET ORAL at 07:42

## 2018-08-22 NOTE — PLAN OF CARE
ALTERATION IN THE BREAST     Optimize infant feeding at the breast Completed        DISCHARGE PLANNING     Discharge to home or other facility with appropriate resources Completed        INADEQUATE LATCH, SUCK OR SWALLOW     Demonstrate ability to latch and sustain latch, audible swallowing and satiety Completed        INFECTION - ADULT     Absence or prevention of progression during hospitalization Completed     Absence of fever/infection during neutropenic period Completed        Knowledge Deficit     Patient/family/caregiver demonstrates understanding of disease process, treatment plan, medications, and discharge instructions Completed        PAIN - ADULT     Verbalizes/displays adequate comfort level or baseline comfort level Completed        POSTPARTUM     Experiences normal postpartum course Completed     Appropriate maternal -  bonding Completed     Establishment of infant feeding pattern Completed     Incision(s), wounds(s) or drain site(s) healing without S/S of infection Completed        SAFETY ADULT     Patient will remain free of falls Completed     Maintain or return to baseline ADL function Completed     Maintain or return mobility status to optimal level Completed

## 2018-08-22 NOTE — PROGRESS NOTES
Postpartum Progress Note       PROCEDURE: Spontaneous Vaginal Delivery    SUBJECTIVE:    Pain: yes, controlled with motrin    Tolerating Oral Intake: yes     Voiding: yes    Flatus: yes    Bowel Movement: yes    Ambulating: yes    Breastfeeding: yes    Chest Pain: no    Shortness of Breath: no    Leg Pain/Discomfort: no    Lochia: minimal    Other: Patient comfortable at bedside   She complains only of mild cramping controlled with motrin      OBJECTIVE:    General: No Acute Distress     Cardiovascular: Regular, Rate and Rhythm, no murmur, gallop or rub     Lungs: Clear to Auscultation Bilaterally, no wheezing, rhonchi or rales     Abdomen: Soft, non-distended, non-tender, no rebound, guarding or CVA tenderness     Fundus: Firm & Non-Tender     Fundal Location:    at the umbilicus    Lower Extremities: Non-Tender, no edema    Vitals:    08/21/18 2318   BP: 105/57   Pulse: 105   Resp: 18   Temp: 98 4 °F (36 9 °C)   SpO2:          Results from last 7 days  Lab Units 08/20/18  0805   WBC Thousand/uL 11 22*   HEMOGLOBIN g/dL 12 3   HEMATOCRIT % 38 8   PLATELETS Thousands/uL 300   NEUTROS PCT % 73   MONOS PCT % 7           Invalid input(s): LABALBU      No results found for: PHOS           No results found for: TROPONINI  ABG:No results found for: PHART, IPS3JMY, PO2ART, SBJ4LXI, X7EOIZGG, BEART, SOURCE  LABS / TESTS / MEDICATION:      Admission on 08/20/2018   Component Date Value    ABO Grouping 08/20/2018 O     Rh Factor 08/20/2018 Positive     Antibody Screen 08/20/2018 Negative     Specimen Expiration Date 08/20/2018 [id number]     WBC 08/20/2018 11 22*    RBC 08/20/2018 4 67     Hemoglobin 08/20/2018 12 3     Hematocrit 08/20/2018 38 8     MCV 08/20/2018 83     MCH 08/20/2018 26 3*    MCHC 08/20/2018 31 7     RDW 08/20/2018 13 9     MPV 08/20/2018 11 4     Platelets 40/88/8813 300     nRBC 08/20/2018 0     Neutrophils Relative 08/20/2018 73     Immat GRANS % 08/20/2018 1     Lymphocytes Relative 2018 19     Monocytes Relative 2018 7     Eosinophils Relative 2018 0     Basophils Relative 2018 0     Neutrophils Absolute 2018 8 08*    Immature Grans Absolute 2018 0 14     Lymphocytes Absolute 2018 2 15     Monocytes Absolute 2018 0 79     Eosinophils Absolute 2018 0 02     Basophils Absolute 2018 0 04     RPR 2018 Non-Reactive     pH, Cord Art 2018 7 408     pCO2, Cord Art 2018 36 0     pO2, Cord Art 2018 29 4*    HCO3, Cord Art 2018 22 2     Base Exc, Cord Art 2018 -1 8*    O2 Content, Cord Art 2018 16 9     O2 Hgb, Arterial Cord 2018 72 2     pH, Cord Tomi 2018 7 384     pCO2, Cord Tomi 2018 37 0     pO2, Cord Tomi 2018 29 2     HCO3, Cord Tomi 2018 21 6     Base Exc, Cord Tomi 2018 -2 8*    O2 Cont, Cord Tomi 2018 16 6     O2 HGB,VENOUS CORD 2018 71 2        Current Facility-Administered Medications   Medication Dose Route Frequency    acetaminophen (TYLENOL) tablet 650 mg  650 mg Oral Q6H PRN    benzocaine-menthol-lanolin-aloe (DERMOPLAST) 20-0 5 % topical spray   Topical 4x Daily PRN    calcium carbonate (TUMS) chewable tablet 1,000 mg  1,000 mg Oral Daily PRN    diphenhydrAMINE (BENADRYL) tablet 25 mg  25 mg Oral Q6H PRN    docusate sodium (COLACE) capsule 100 mg  100 mg Oral BID    hydrocortisone 1 % cream   Topical 4x Daily PRN    ibuprofen (MOTRIN) tablet 600 mg  600 mg Oral Q6H PRN    ketorolac (TORADOL) injection 30 mg  30 mg Intravenous Q6H PRN    lactated ringers infusion  125 mL/hr Intravenous Continuous    medroxyPROGESTERone (DEPO-PROVERA) IM injection 150 mg  150 mg Intramuscular Once    ondansetron (ZOFRAN) injection 4 mg  4 mg Intravenous Q6H PRN    witch hazel-glycerin (TUCKS) topical pad 1 pad  1 pad Topical Q4H PRN       ASSESSMENT AND PLAN:   Postpartum day # 2   Status post:   1   Continue Routine Postpartum Care  2  Encourage Ambulation  3  Advance diet as tolerated  4  Pain control with motrin and tylenol  5  Breast pain: Warm compresses between breastfeeding    6  Plan Contraception discussed: Depo-Provera, will discuss with EOB clinic TL during next appointment  7  D/c today       Signature/Title: Myrna Marquez MD  Date: 8/22/2018  Time: 6:09 AM

## 2018-08-22 NOTE — LACTATION NOTE
This note was copied from a baby's chart  Reeds says breastfeeding is going very well  Encouraged her to feed her baby more frequently  Met with mother to go over feeding log since birth for the first week  Emphasized 8 or more (12) feedings in a 24 hour period, what to expect for the number of diapers per day of life and the progression of properties of the  stooling pattern  Discussed s/s that breastfeeding is going well after day 4 and when to get help from a pediatrician or lactation support person after day 4  Booklet included Breast Pumping Instructions, When You Go Back to Work or School, and Breastfeeding Resources for after discharge including access to the number for the [employer]  Discussed s/s engorgement and how to manage with medications and cool compresses as well as s/s mastitis and when to contact physician  Encoraged MOB and FOB to call for assistance, questions and concerns  Extension number for inpatient lactation support provided

## 2018-08-23 NOTE — CASE MANAGEMENT
Notification of Maternity Inpatient Admission/Maternity Inpatient Authorization Request  This is a Notification of Maternity Inpatient Admission/Maternity Inpatient Authorization Request to our facility [facility]  Please be advised that this patient is currently in our facility under Inpatient Status  Below you will find the Birth/Derby Summary, Attending Physician and Facilitys information including NPI#  and contact information for the Utilization Review Department where the patient is receiving care services  Place of Service Code: 24   Place of Service Name: Inpatient Hospital  Presentation Date & Time: 2018  5:33 AM  Inpatient Admission Date & Time: 18 0991  Discharge Date & Time: 2018 12:52 PM   Discharge Disposition (if discharged): Home/Self Care  Attending Physician:   DOMINIC Corea  Specialty- Obstetrics and Gynecology  [address] [id number]  [address] [place] [address]  Phone 1: [phone]  Fax: [phone]  Mother's Admitting Diagnosis: Encounter for full-term uncomplicated delivery [S56]  Premature rupture of membranes, unspecified as to length of time between rupture and onset of labor, unspecified weeks of gestation [O42 90]  40 weeks gestation of pregnancy [Z3A 40]  Estimated Date of Delivery: 18    Mother of  Information: Maude Dill   MRN: [id number] YOB: 1990  Derby Information:   Information for the patient's : Lawson Estes [[id number]]      Delivery Information:  Sex: male  Delivered 2018 8:40 AM by Vaginal, Spontaneous Delivery; Gestational Age: 37w0d    Derby Measurements:  Weight: 7 lb 11 1 oz (3490 g);   Height: 20 5"    APGAR 1 minute 5 minutes 10 minutes   Totals: 9 9      OB History      Para Term  AB Living    4 2 2 0 2 2    SAB TAB Ectopic Multiple Live Births    1 1 0 0 2          Facility:   [facility])  Address: [address], [address] [facility]  Phone: [phone] Tax ID: [id number]  NPI: [id number]  Medicare ID: [id number]  Thank you,  Thomas Harley  Utilization Review Department  Phone: [phone]; Fax [phone]  ATTENTION: Please call with any questions or concerns to [phone]  and carefully follow the prompts so that you are directed to the right person  Send all requests for admission clinical reviews, approved or denied determinations and any other requests to fax [phone]   All voicemails are confidential

## 2018-09-05 LAB — PLACENTA IN STORAGE: NORMAL

## 2018-09-10 ENCOUNTER — OFFICE VISIT (OUTPATIENT)
Dept: OBGYN CLINIC | Facility: CLINIC | Age: 28
End: 2018-09-10
Payer: COMMERCIAL

## 2018-09-10 VITALS
WEIGHT: 112 LBS | BODY MASS INDEX: 20.61 KG/M2 | HEART RATE: 98 BPM | SYSTOLIC BLOOD PRESSURE: 106 MMHG | HEIGHT: 62 IN | DIASTOLIC BLOOD PRESSURE: 70 MMHG

## 2018-09-10 DIAGNOSIS — Z30.013 ENCOUNTER FOR INITIAL PRESCRIPTION OF INJECTABLE CONTRACEPTIVE: Primary | ICD-10-CM

## 2018-09-10 LAB — SL AMB POCT URINE HCG: NEGATIVE

## 2018-09-10 PROCEDURE — 81025 URINE PREGNANCY TEST: CPT | Performed by: NURSE PRACTITIONER

## 2018-09-10 PROCEDURE — 99429 UNLISTED PREVENTIVE SERVICE: CPT

## 2018-09-10 PROCEDURE — 96372 THER/PROPH/DIAG INJ SC/IM: CPT

## 2018-09-10 RX ORDER — MEDROXYPROGESTERONE ACETATE 150 MG/ML
150 INJECTION, SUSPENSION INTRAMUSCULAR
Qty: 1 ML | Refills: 3 | Status: SHIPPED | OUTPATIENT
Start: 2018-09-10 | End: 2019-01-10 | Stop reason: SDUPTHER

## 2018-09-10 RX ORDER — MEDROXYPROGESTERONE ACETATE 150 MG/ML
150 INJECTION, SUSPENSION INTRAMUSCULAR ONCE
Status: CANCELLED | OUTPATIENT
Start: 2018-09-10 | End: 2018-09-10

## 2018-09-10 RX ORDER — MEDROXYPROGESTERONE ACETATE 150 MG/ML
150 INJECTION, SUSPENSION INTRAMUSCULAR ONCE
Status: COMPLETED | OUTPATIENT
Start: 2018-09-10 | End: 2018-09-10

## 2018-09-10 RX ADMIN — MEDROXYPROGESTERONE ACETATE 150 MG: 150 INJECTION, SUSPENSION INTRAMUSCULAR at 17:06

## 2019-01-10 ENCOUNTER — OFFICE VISIT (OUTPATIENT)
Dept: OBGYN CLINIC | Facility: CLINIC | Age: 29
End: 2019-01-10

## 2019-01-10 VITALS
HEART RATE: 101 BPM | WEIGHT: 110 LBS | HEIGHT: 62 IN | SYSTOLIC BLOOD PRESSURE: 101 MMHG | DIASTOLIC BLOOD PRESSURE: 73 MMHG | BODY MASS INDEX: 20.24 KG/M2

## 2019-01-10 DIAGNOSIS — Z01.419 ENCOUNTER FOR GYNECOLOGICAL EXAMINATION WITHOUT ABNORMAL FINDING: Primary | ICD-10-CM

## 2019-01-10 DIAGNOSIS — Z30.013 ENCOUNTER FOR INITIAL PRESCRIPTION OF INJECTABLE CONTRACEPTIVE: ICD-10-CM

## 2019-01-10 PROBLEM — O99.013 ANEMIA DURING PREGNANCY IN THIRD TRIMESTER: Status: RESOLVED | Noted: 2018-06-08 | Resolved: 2019-01-10

## 2019-01-10 PROCEDURE — 99395 PREV VISIT EST AGE 18-39: CPT | Performed by: NURSE PRACTITIONER

## 2019-01-10 RX ORDER — MEDROXYPROGESTERONE ACETATE 150 MG/ML
150 INJECTION, SUSPENSION INTRAMUSCULAR
Qty: 1 ML | Refills: 3 | Status: SHIPPED | OUTPATIENT
Start: 2019-01-10 | End: 2019-11-26 | Stop reason: ALTCHOICE

## 2019-01-10 RX ORDER — INFLUENZA A VIRUS A/MICHIGAN/45/2015 X-275 (H1N1) ANTIGEN (FORMALDEHYDE INACTIVATED), INFLUENZA A VIRUS A/HONG KONG/4801/2014 X-263B (H3N2) ANTIGEN (FORMALDEHYDE INACTIVATED), INFLUENZA B VIRUS B/PHUKET/3073/2013 ANTIGEN (FORMALDEHYDE INACTIVATED), AND INFLUENZA B VIRUS B/BRISBANE/60/2008 ANTIGEN (FORMALDEHYDE INACTIVATED) 15; 15; 15; 15 UG/.5ML; UG/.5ML; UG/.5ML; UG/.5ML
INJECTION, SUSPENSION INTRAMUSCULAR
COMMUNITY
Start: 2018-12-07 | End: 2020-01-28

## 2019-01-10 NOTE — PROGRESS NOTES
Diagnoses and all orders for this visit:    Encounter for gynecological examination without abnormal finding    Encounter for initial prescription of injectable contraceptive  -     medroxyPROGESTERone (DEPO-PROVERA) 150 mg/mL injection; Inject 1 mL (150 mg total) into a muscle every 3 (three) months    Other orders  -     FLUZONE QUADRIVALENT 0 5 ML CHRIS;     RTO at next scheduled Depo appt  ASSESSMENT & PLAN: Josep Jacobson is a 26 y o  [id number] with normal gynecologic exam     1   Routine well woman exam done today  2  Pap and HPV:  The patient's last pap and hpv was 1/22/2018  It was normal     Pap and cotesting was not done today  Next pap in 2021  Current ASCCP Guidelines reviewed  3   The following were reviewed in today's visit: breast self exam, STD testing, HIV risk factors and prevention, family planning choices, adequate intake of calcium and vitamin D, exercise and healthy diet  4    Patient desires to stay on Depo at this time, will call if she desires to switch to Mirena IUD  CC:  Annual Gynecologic Examination    HPI: Josep Jacobson is a 26 y o  [id number] who presents for annual gynecologic examination  She is on Depo-Provera for contraception, and received her last dose 12/7/2018 at the pharmacy because the office was closed  She has the following concerns:  She has had some hair loss since she delivered, with this has improved with her taking a vitamin  She is breastfeeding, she delivered her baby 08/20/2018 and had a baby boy named Arturo Hardy  She is considering using Mirena IUD but states she is scared  I discussed information about her Mirena, insertion, removal, side effects and risks  Patient states she wants to think about it, she is scheduled for her next Depo 02/28/2019 and will call if she desires Mirena IUD  She is amenorrheic with Depo  Health Maintenance:    She wears her seatbelt routinely      She does perform regular monthly self breast exams  She feels safe at home  Past Medical History:   Diagnosis Date    Varicella        Past Surgical History:   Procedure Laterality Date    RHINOPLASTY      VAGINAL DELIVERY         Past OB/Gyn History:  OB History      Para Term  AB Living    4 2 2 0 2 2    SAB TAB Ectopic Multiple Live Births    1 1 0 0 2        Pt does not have menstrual issues  Amenorrheic with Depo   History of sexually transmitted infection: No   History of abnormal pap smears: No      Patient is currently sexually active  heterosexual   The current method of family planning is Depo-Provera injections  Family History   Problem Relation Age of Onset    Diabetes Mother     Hypertension Father         pacemaker       Social History:  Social History     Social History    Marital status: Single     Spouse name: N/A    Number of children: N/A    Years of education: N/A     Occupational History    Not on file  Social History Main Topics    Smoking status: Never Smoker    Smokeless tobacco: Never Used    Alcohol use No    Drug use: No    Sexual activity: Yes     Partners: Male     Other Topics Concern    Not on file     Social History Narrative    No narrative on file     Presently lives with family  Patient is single       Allergies   Allergen Reactions    Dust Mite Extract     Pollen Extract     Tree Extract          Current Outpatient Prescriptions:     medroxyPROGESTERone (DEPO-PROVERA) 150 mg/mL injection, Inject 1 mL (150 mg total) into a muscle every 3 (three) months, Disp: 1 mL, Rfl: 3    Prenatal Vit-Fe Fumarate-FA (PRENATAL VITAMIN) 27-0 8 MG TABS, Take 1 tablet by mouth daily, Disp: , Rfl:     acetaminophen (TYLENOL) 325 mg tablet, Take 2 tablets (650 mg total) by mouth every 6 (six) hours as needed for mild pain or headaches, Disp: 30 tablet, Rfl: 0    FLUZONE QUADRIVALENT 0 5 ML CHRIS, , Disp: , Rfl:     ibuprofen (MOTRIN) 600 mg tablet, Take 1 tablet (600 mg total) by mouth every 6 (six) hours as needed for mild pain, Disp: 30 tablet, Rfl: 0    Misc  Devices (BREAST PUMP) MISC, by Does not apply route as needed (1) Electric breast pump, Disp: 1 each, Rfl: 0      Review of Systems  Constitutional :no fever, feels well, no tiredness, no recent weight gain or loss  ENT: no ear ache, no loss of hearing, no nosebleeds or nasal discharge, no sore throat or hoarseness  Cardiovascular: no complaints of slow or fast heart beat, no chest pain, no palpitations, no leg claudication or lower extremity edema  Respiratory: no complaints of shortness of shortness of breath, no TALLEY  Breasts:no complaints of breast pain, breast lump, or nipple discharge  Gastrointestinal: no complaints of abdominal pain, constipation, nausea, vomiting, or diarrhea or bloody stools  Genitourinary : no complaints of dysuria, incontinence, pelvic pain, no dysmenorrhea, vaginal discharge or abnormal vaginal bleeding and as noted in HPI  Musculoskeletal: no complaints of arthralgia, no myalgia, no joint swelling or stiffness, no limb pain or swelling    Integumentary: no complaints of skin rash or lesion, itching or dry skin  Neurological: no complaints of headache, no confusion, no numbness or tingling, no dizziness or fainting    Objective      /73 (BP Location: Left arm, Patient Position: Sitting, Cuff Size: Adult)   Pulse 101   Ht 5' 2" (1 575 m)   Wt 49 9 kg (110 lb)   BMI 20 12 kg/m²   General:   appears stated age, cooperative, alert normal mood and affect   Neck: normal, supple,trachea midline, no masses   Heart: regular rate and rhythm, S1, S2 normal, no murmur, click, rub or gallop   Lungs: clear to auscultation bilaterally   Breasts: normal appearance, no masses or tenderness, Normal to palpation without dominant masses, Taught monthly breast self examination, Is still nursing   Abdomen: soft, non-tender, without masses or organomegaly   Vulva: normal female genitalia, Bartholin's, Urethra, [place] normal   Vagina: normal vagina, no discharge, exudate, lesion, or erythema   Urethra: normal   Cervix: Normal, no discharge  Nontender  Uterus: normal size, contour, position, consistency, mobility, non-tender and anteverted   Adnexa: normal adnexa and no mass, fullness, tenderness   Lymphatic palpation of lymph nodes in neck, axilla, groin and/or other locations: no lymphadenopathy or masses noted   Skin normal skin turgor and no rashes     Psychiatric orientation to person, place, and time: normal  mood and affect: normal

## 2019-02-25 ENCOUNTER — CLINICAL SUPPORT (OUTPATIENT)
Dept: OBGYN CLINIC | Facility: CLINIC | Age: 29
End: 2019-02-25

## 2019-02-25 DIAGNOSIS — Z30.42 ENCOUNTER FOR DEPO-PROVERA CONTRACEPTION: Primary | ICD-10-CM

## 2019-02-25 PROCEDURE — 96372 THER/PROPH/DIAG INJ SC/IM: CPT

## 2019-02-25 RX ORDER — MEDROXYPROGESTERONE ACETATE 150 MG/ML
150 INJECTION, SUSPENSION INTRAMUSCULAR ONCE
Status: COMPLETED | OUTPATIENT
Start: 2019-02-25 | End: 2019-02-25

## 2019-02-25 RX ADMIN — MEDROXYPROGESTERONE ACETATE 150 MG: 150 INJECTION, SUSPENSION INTRAMUSCULAR at 14:37

## 2019-02-25 NOTE — PROGRESS NOTES
Depo-Provera      [x]   Patient provided box  Smiley Thorne    Last  Annual/Pap Date: 1/10/19  Clifdarío Chava Last Depo date: 12/7/18   Side effects:    HCG; if applicable: N/A   Given by: Jennifer Reed MA     Site: R deltoid   Next appt  due:    5/13 - 5/27/19   Calcium supplement daily teaching, condoms for 2 weeks following first injection dose

## 2019-05-16 ENCOUNTER — CLINICAL SUPPORT (OUTPATIENT)
Dept: OBGYN CLINIC | Facility: CLINIC | Age: 29
End: 2019-05-16

## 2019-05-16 DIAGNOSIS — Z30.42 ENCOUNTER FOR SURVEILLANCE OF INJECTABLE CONTRACEPTIVE: Primary | ICD-10-CM

## 2019-05-16 PROCEDURE — 96372 THER/PROPH/DIAG INJ SC/IM: CPT

## 2019-05-16 RX ORDER — MEDROXYPROGESTERONE ACETATE 150 MG/ML
150 INJECTION, SUSPENSION INTRAMUSCULAR ONCE
Status: COMPLETED | OUTPATIENT
Start: 2019-05-16 | End: 2019-05-16

## 2019-05-16 RX ADMIN — MEDROXYPROGESTERONE ACETATE 150 MG: 150 INJECTION, SUSPENSION INTRAMUSCULAR at 17:24

## 2019-08-01 ENCOUNTER — CLINICAL SUPPORT (OUTPATIENT)
Dept: OBGYN CLINIC | Facility: CLINIC | Age: 29
End: 2019-08-01

## 2019-08-01 DIAGNOSIS — Z30.42 ENCOUNTER FOR MANAGEMENT AND INJECTION OF DEPO-PROVERA: Primary | ICD-10-CM

## 2019-08-01 PROCEDURE — 96372 THER/PROPH/DIAG INJ SC/IM: CPT

## 2019-08-01 RX ORDER — MEDROXYPROGESTERONE ACETATE 150 MG/ML
150 INJECTION, SUSPENSION INTRAMUSCULAR ONCE
Status: COMPLETED | OUTPATIENT
Start: 2019-08-01 | End: 2019-08-01

## 2019-08-01 RX ADMIN — MEDROXYPROGESTERONE ACETATE 150 MG: 150 INJECTION, SUSPENSION INTRAMUSCULAR at 17:17

## 2019-08-01 NOTE — PROGRESS NOTES
Depo-Provera      [x]   Patient provided box Darryle Pian    Last  Annual/Pap Date: 1/10/19  Edis Taras Last Depo date: 5/16/19   Side effects: no   HCG: no  o if applicable: negative   Given by: Louis Feldman MA   Site: Right deltoid     Calcium supplement daily teaching, condoms for 2 weeks following first injection dose

## 2019-10-17 ENCOUNTER — OFFICE VISIT (OUTPATIENT)
Dept: OBGYN CLINIC | Facility: CLINIC | Age: 29
End: 2019-10-17

## 2019-10-17 VITALS
WEIGHT: 121 LBS | HEART RATE: 101 BPM | HEIGHT: 62 IN | DIASTOLIC BLOOD PRESSURE: 90 MMHG | SYSTOLIC BLOOD PRESSURE: 123 MMHG | BODY MASS INDEX: 22.26 KG/M2

## 2019-10-17 DIAGNOSIS — Z11.3 ROUTINE SCREENING FOR STI (SEXUALLY TRANSMITTED INFECTION): ICD-10-CM

## 2019-10-17 DIAGNOSIS — Z30.09 ENCOUNTER FOR COUNSELING REGARDING CONTRACEPTION: Primary | ICD-10-CM

## 2019-10-17 PROCEDURE — 99213 OFFICE O/P EST LOW 20 MIN: CPT | Performed by: NURSE PRACTITIONER

## 2019-10-17 NOTE — PATIENT INSTRUCTIONS
Please read information on Mirena IUD and call if any questions   return to office in 1 week for Mirena IUD insertion

## 2019-10-17 NOTE — PROGRESS NOTES
Assessment/Plan:     Diagnoses and all orders for this visit:    Encounter for counseling regarding contraception   reviewed Mirena IUD,  Risks,  Side effects, benefits, irregular bleeding pattern,  Insertion,  Removal,  Ectopic pregnancy risk,  Efficacy and failure rate  Information on Mirena IUD given to patient to read   return to office in 1 week for Mirena IUD insertion   review to call with any questions or concerns      Routine screening for STI (sexually transmitted infection)  -     Chlamydia/GC amplified DNA by PCR; Future       Annual gyn exam due 01/10/2020    Subjective:      Patient ID: Margarito Gonzales is a 26 y o  female  HPI  71-year-old  022 currently on Depo-Provera for contraception, last Depo was 2019  She is amenorrheic with use  She reports previously she was on OCPs and got pregnant while being on birth control pills  She would just like to switch  From Depo  She last delivered 2018  patient denies history of STD but her  last chlamydia gonorrhea was done 2018, patient desires to be tested  Will send to lab for urine Chlamydia and gonorrhea, patient reports she is in a monogamous relationship and denies any vaginal symptoms or pelvic pain  History of vaginal delivery x2  patient had annual gyn exam done 01/10/2019    The following portions of the patient's history were reviewed and updated as appropriate: allergies, current medications, past family history, past medical history, past social history, past surgical history and problem list     Review of Systems      Objective: There were no vitals taken for this visit           Physical Exam

## 2019-10-23 LAB
C TRACH RRNA SPEC QL NAA+PROBE: NOT DETECTED
N GONORRHOEA RRNA SPEC QL NAA+PROBE: NOT DETECTED

## 2019-10-25 ENCOUNTER — TELEPHONE (OUTPATIENT)
Dept: OBGYN CLINIC | Facility: CLINIC | Age: 29
End: 2019-10-25

## 2019-10-25 ENCOUNTER — PROCEDURE VISIT (OUTPATIENT)
Dept: OBGYN CLINIC | Facility: CLINIC | Age: 29
End: 2019-10-25

## 2019-10-25 VITALS
DIASTOLIC BLOOD PRESSURE: 69 MMHG | SYSTOLIC BLOOD PRESSURE: 115 MMHG | WEIGHT: 120 LBS | BODY MASS INDEX: 21.95 KG/M2 | HEART RATE: 101 BPM

## 2019-10-25 DIAGNOSIS — Z30.430 ENCOUNTER FOR IUD INSERTION: Primary | ICD-10-CM

## 2019-10-25 PROCEDURE — 58300 INSERT INTRAUTERINE DEVICE: CPT | Performed by: OBSTETRICS & GYNECOLOGY

## 2019-10-25 NOTE — TELEPHONE ENCOUNTER
----- Message from Devin Alonzo, [address] sent at 10/23/2019  3:08 PM EDT -----  Please call pt to inform of negative cultures    Thanks

## 2019-10-25 NOTE — PROGRESS NOTES
Iud insertions  Date/Time: 10/25/2019 12:44 PM  Performed by: Tom Perdomo MD  Authorized by: Tom Perdomo MD     Consent:     Consent obtained:  Verbal and written    Consent given by:  Patient    Procedure risks and benefits discussed: yes      Patient questions answered: yes      Patient agrees, verbalizes understanding, and wants to proceed: yes      Educational handouts given: yes      Instructions and paperwork completed: yes    Procedure:     Pelvic exam performed: no      Negative GC/chlamydia test: yes      Cervix cleaned and prepped: yes      Speculum placed in vagina: yes      Tenaculum applied to cervix: yes      Uterus sounded: yes      Uterus sound depth (cm):  7    IUD inserted with no complications: yes      IUD type:  Mirena    Strings trimmed: yes    Post-procedure:     Patient tolerated procedure well: yes      Patient will follow up after next period: yes

## 2019-10-28 ENCOUNTER — TELEPHONE (OUTPATIENT)
Dept: OBGYN CLINIC | Facility: CLINIC | Age: 29
End: 2019-10-28

## 2019-10-28 NOTE — TELEPHONE ENCOUNTER
----- Message from Nery Adkins, [address] sent at 10/23/2019  3:08 PM EDT -----  Please call pt to inform of negative cultures    Thanks

## 2019-10-29 ENCOUNTER — TELEPHONE (OUTPATIENT)
Dept: OBGYN CLINIC | Facility: CLINIC | Age: 29
End: 2019-10-29

## 2019-10-29 NOTE — TELEPHONE ENCOUNTER
----- Message from Josette Marques, [address] sent at 10/23/2019  3:08 PM EDT -----  Please call pt to inform of negative cultures    Thanks

## 2019-11-21 ENCOUNTER — TELEPHONE (OUTPATIENT)
Dept: OBGYN CLINIC | Facility: CLINIC | Age: 29
End: 2019-11-21

## 2019-11-21 NOTE — TELEPHONE ENCOUNTER
----- Message from Cristina Mitchell sent at 11/21/2019  4:34 PM EST -----  Pt concerned because she has been spotting since IUD insertion  Wearing liners throughout the day  No other symptoms  Would like you to call her and discuss  Coming in Dec 5 for IUD check        left message for patient to call back

## 2019-11-25 NOTE — PROGRESS NOTES
Assessment/Plan:       1/10/2020 for annual     Diagnoses and all orders for this visit:    IUD (intrauterine device) in place      Patient to call with any concerns    Subjective:      Patient ID: Luis A Lester is a 26 y o  female  HPI   43-year-old [id number]  here for IUD check  Her Mirena IUD was placed 10/25/2019  Culture for chlamydia gonorrhea done on 10/22/2019 where both negative  She is happy with method but has had light  Spotting since placed  Reviewed expected as her body adjusts  She denies any pelvic pain, dysparunia or any other symptoms  previously she was on Depo and her last injection  was 08/01/2019- she was amenorrheic with use  She last delivered 08/2018      The following portions of the patient's history were reviewed and updated as appropriate: allergies, current medications, past family history, past medical history, past social history, past surgical history and problem list     Review of Systems   Constitutional: Negative  Respiratory: Negative  Cardiovascular: Negative  Genitourinary: Positive for menstrual problem  Negative for vaginal discharge  Neurological: Negative  Psychiatric/Behavioral: Negative  Objective: There were no vitals taken for this visit  Physical Exam   Constitutional: She appears well-nourished  Abdominal: There is no tenderness  Genitourinary: Vagina normal and uterus normal    Skin: Skin is warm and dry  Psychiatric: She has a normal mood and affect  Her behavior is normal      cx- Mirena IUD strings approx 2 cm in length, neg   CMT  Uterus NT

## 2019-11-26 ENCOUNTER — OFFICE VISIT (OUTPATIENT)
Dept: OBGYN CLINIC | Facility: CLINIC | Age: 29
End: 2019-11-26

## 2019-11-26 VITALS
DIASTOLIC BLOOD PRESSURE: 72 MMHG | SYSTOLIC BLOOD PRESSURE: 102 MMHG | HEART RATE: 100 BPM | RESPIRATION RATE: 16 BRPM | BODY MASS INDEX: 22.17 KG/M2 | WEIGHT: 121.2 LBS

## 2019-11-26 DIAGNOSIS — Z97.5 IUD (INTRAUTERINE DEVICE) IN PLACE: Primary | ICD-10-CM

## 2019-11-26 PROBLEM — Z11.3 ROUTINE SCREENING FOR STI (SEXUALLY TRANSMITTED INFECTION): Status: RESOLVED | Noted: 2019-10-17 | Resolved: 2019-11-26

## 2019-11-26 PROBLEM — Z30.09 ENCOUNTER FOR COUNSELING REGARDING CONTRACEPTION: Status: RESOLVED | Noted: 2019-10-17 | Resolved: 2019-11-26

## 2019-11-26 PROBLEM — Z30.013 ENCOUNTER FOR INITIAL PRESCRIPTION OF INJECTABLE CONTRACEPTIVE: Status: RESOLVED | Noted: 2018-09-10 | Resolved: 2019-11-26

## 2019-11-26 PROCEDURE — 99213 OFFICE O/P EST LOW 20 MIN: CPT | Performed by: NURSE PRACTITIONER

## 2020-01-28 ENCOUNTER — OFFICE VISIT (OUTPATIENT)
Dept: FAMILY MEDICINE CLINIC | Facility: OTHER | Age: 30
End: 2020-01-28
Payer: COMMERCIAL

## 2020-01-28 VITALS
TEMPERATURE: 98.8 F | BODY MASS INDEX: 22.56 KG/M2 | DIASTOLIC BLOOD PRESSURE: 68 MMHG | WEIGHT: 122.6 LBS | HEIGHT: 62 IN | HEART RATE: 96 BPM | OXYGEN SATURATION: 98 % | SYSTOLIC BLOOD PRESSURE: 96 MMHG

## 2020-01-28 DIAGNOSIS — Z23 ENCOUNTER FOR IMMUNIZATION: Primary | ICD-10-CM

## 2020-01-28 DIAGNOSIS — Z13.1 SCREENING FOR DIABETES MELLITUS: ICD-10-CM

## 2020-01-28 PROCEDURE — 3725F SCREEN DEPRESSION PERFORMED: CPT | Performed by: FAMILY MEDICINE

## 2020-01-28 PROCEDURE — 99213 OFFICE O/P EST LOW 20 MIN: CPT | Performed by: FAMILY MEDICINE

## 2020-01-28 PROCEDURE — 90471 IMMUNIZATION ADMIN: CPT | Performed by: FAMILY MEDICINE

## 2020-01-28 PROCEDURE — 90686 IIV4 VACC NO PRSV 0.5 ML IM: CPT | Performed by: FAMILY MEDICINE

## 2020-01-28 PROCEDURE — 3008F BODY MASS INDEX DOCD: CPT | Performed by: FAMILY MEDICINE

## 2020-01-28 NOTE — PROGRESS NOTES
Assessment/Plan:    Patient is a generally healthy 53-year-old female with no significant past medical history presenting to establish care at this time  Discussed with patient due to significant risk of diabetes due to family history, will order screening lab work and follow-up with patient as needed  Patient reports no complaints or issues at this time  Diagnoses and all orders for this visit:    Encounter for immunization  -     influenza vaccine, [id number], quadrivalent, 0 5 mL, preservative-free, for adult and pediatric patients 6 mos+ (AFLURIA, FLUARIX, FLULAVAL, FLUZONE)    Screening for diabetes mellitus  -     CBC and differential; Future  -     Comprehensive metabolic panel; Future          Subjective:      Patient ID: Caesar Rosenthal is a 31 y o  female  Patient here to establish care  Generally healthy diet ( eats lots of fruits/vegetables, lean meats such as chicken and fish, limits junk food)  Notes she was exercising about 3x a week ,however, due to her son being diagnosed with Depressive Disorder (son is 6years old) patient has stopped pursuing exercise/ self care  Sleeps about 6-7 hours/ night feels well rested  No complaints or issues to discuss at this time  Of note, patient has a strong family history of DM, ADHD, and depression      The following portions of the patient's history were reviewed and updated as appropriate: allergies, current medications, past family history, past medical history, past social history, past surgical history and problem list     Review of Systems   Constitutional: Negative for chills and fever  HENT: Negative for sinus pressure and sinus pain  Eyes: Negative for visual disturbance  Respiratory: Negative for chest tightness and shortness of breath  Cardiovascular: Negative for chest pain and palpitations  Gastrointestinal: Negative for nausea and vomiting  Genitourinary: Negative for difficulty urinating and dysuria     Musculoskeletal: Negative for arthralgias  Skin: Negative for rash  Neurological: Negative for dizziness, syncope, light-headedness and headaches  Objective:      BP 96/68 (BP Location: Left arm, Patient Position: Sitting, Cuff Size: Adult)   Pulse 96   Temp 98 8 °F (37 1 °C) (Tympanic)   Ht 5' 2" (1 575 m)   Wt 55 6 kg (122 lb 9 6 oz)   SpO2 98%   BMI 22 42 kg/m²          Physical Exam   Constitutional: She is oriented to person, place, and time  She appears well-developed and well-nourished  HENT:   Head: Normocephalic and atraumatic  Nose: Nose normal    Eyes: Conjunctivae and EOM are normal    Neck: Normal range of motion  Neck supple  Cardiovascular: Normal rate, regular rhythm and normal heart sounds  Pulmonary/Chest: Effort normal and breath sounds normal    Abdominal: Soft  Bowel sounds are normal    Musculoskeletal: Normal range of motion  Neurological: She is alert and oriented to person, place, and time  Skin: Skin is warm and dry  Psychiatric: She has a normal mood and affect  Vitals reviewed  The patient indicates understanding of these issues and agrees with the plan    Suhail Hays MD

## 2020-01-28 NOTE — PATIENT INSTRUCTIONS

## 2020-02-11 ENCOUNTER — CLINICAL SUPPORT (OUTPATIENT)
Dept: OBGYN CLINIC | Facility: CLINIC | Age: 30
End: 2020-02-11

## 2020-02-11 DIAGNOSIS — N92.6 IRREGULAR BLEEDING: Primary | ICD-10-CM

## 2020-02-11 LAB — SL AMB POCT URINE HCG: NEGATIVE

## 2020-02-11 PROCEDURE — T1015 CLINIC SERVICE: HCPCS

## 2020-02-11 PROCEDURE — 81025 URINE PREGNANCY TEST: CPT

## 2020-02-11 NOTE — PROGRESS NOTES
Patient here with concern of possible pregnancy  Pt has IUD  Experiencing breast tenderness and irregular bleeding  UPT done in office today, result is negative

## 2020-03-02 ENCOUNTER — ANNUAL EXAM (OUTPATIENT)
Dept: OBGYN CLINIC | Facility: CLINIC | Age: 30
End: 2020-03-02

## 2020-03-02 VITALS
SYSTOLIC BLOOD PRESSURE: 106 MMHG | HEART RATE: 97 BPM | BODY MASS INDEX: 22.08 KG/M2 | HEIGHT: 62 IN | WEIGHT: 120 LBS | DIASTOLIC BLOOD PRESSURE: 70 MMHG

## 2020-03-02 DIAGNOSIS — Z30.431 IUD CHECK UP: ICD-10-CM

## 2020-03-02 DIAGNOSIS — Z01.419 WOMEN'S ANNUAL ROUTINE GYNECOLOGICAL EXAMINATION: Primary | ICD-10-CM

## 2020-03-02 PROCEDURE — 99395 PREV VISIT EST AGE 18-39: CPT | Performed by: NURSE PRACTITIONER

## 2020-03-02 PROCEDURE — T1015 CLINIC SERVICE: HCPCS | Performed by: NURSE PRACTITIONER

## 2020-03-02 RX ORDER — MULTIVITAMIN
1 CAPSULE ORAL DAILY
COMMUNITY

## 2020-03-02 NOTE — PROGRESS NOTES
Subjective      Tommye Lombard is a 31 y o  female who presents for annual well woman exam  Last Pap smear 18 resulted NILM  Next Pap smear due 2021  Periods are regular every 28-30 days, lasting 3-6 days  No intermenstrual bleeding, spotting, or discharge  Current contraception: Mirena IUD effective through 10/25/2024  History of abnormal Pap smear: no  Family history of uterine or ovarian cancer: no  Regular self breast exam: no  History of abnormal mammogram:   Mammograms to begin age 35 unless otherwise clinically indicated  Family history of breast cancer: no  History of abnormal lipids: no   Gardasil: unsure    Menstrual History:  OB History        4    Para   2    Term   2       0    AB   2    Living   2       SAB   1    TAB   1    Ectopic   0    Multiple   0    Live Births   2                Menarche age: 14  Patient's last menstrual period was 2020 (approximate)  Period Cycle (Days): (monthly )  Period Duration (Days): 3-6(spotting )  Dysmenorrhea: None    The following portions of the patient's history were reviewed and updated as appropriate: allergies, current medications, past family history, past medical history, past social history, past surgical history and problem list     Review of Systems  Pertinent items are noted in HPI        Objective      /70   Pulse 97   Ht 5' 1 75" (1 568 m)   Wt 54 4 kg (120 lb)   LMP 2020 (Approximate)   Breastfeeding No   BMI 22 13 kg/m²     General:   alert and oriented, in no acute distress, alert, appears stated age and cooperative   Heart: regular rate and rhythm, S1, S2 normal, no murmur, click, rub or gallop   Lungs: clear to auscultation bilaterally   Abdomen: soft, non-tender, without masses or organomegaly, nondistended and normal bowel sounds   Vulva: normal, Bartholin's, Urethra, [place]'s normal   Vagina: normal mucosa, normal discharge, no palpable nodules   Cervix: no cervical motion tenderness, no lesions and Mirena IUD strings easily visualized on speculum exam   Uterus: normal size, non-tender, normal shape and consistency   Adnexa: normal adnexa and no mass, fullness, tenderness    breast: Nontender, no palpable masses, no nipple discharge, no skin changes bilaterally          Assessment      @well woman  no contraindication to continue hormonal therapy@   Plan      All questions answered  Breast self exam technique reviewed and patient encouraged to perform self-exam monthly  Contraception: Mirena effective through 10/25/2024  Diagnosis explained in detail, including differential   Dietary diary  Discussed healthy lifestyle modifications  Educational material distributed  Follow up in 1 Year annual exam   Follow up as needed  Breast awareness reviewed     reviewed healthy diet, exercise and lifestyle   reviewed recommendation for Gardasil vaccine  Written information provided  Patient will call office to obtain vaccine series if not vaccinated

## 2020-03-02 NOTE — PATIENT INSTRUCTIONS
Human Papillomavirus Vaccine (By injection)   Human Papillomavirus Vaccine (HUE-man pap-ah-BIGG-fermin-VYE-pretty VAX-een)  Helps prevent genital warts and cancer of the anus, cervix, vagina, or vulva, which may be caused by human papillomavirus (HPV)  Brand Name(s): Cervarix, Gardasil, Gardasil 9   There may be other brand names for this medicine  When This Medicine Should Not Be Used: This vaccine is not right for everyone  You should not receive it if you had an allergic reaction to human papillomavirus vaccine or to yeast   How to Use This Medicine:   Injectable  · Your doctor will prescribe your exact dose and tell you how often it should be given  This medicine is given as a shot into one of your muscles  · A nurse or other health provider will give you this medicine  · This vaccine must be given as 2 or 3 doses based on the brand  · Read and follow the patient instructions that come with this medicine  Talk to your doctor or pharmacist if you have any questions  · Missed dose: This vaccine needs to be given on a fixed schedule  If you miss your scheduled shot, call your doctor to make another appointment as soon as possible  Drugs and Foods to Avoid:   Ask your doctor or pharmacist before using any other medicine, including over-the-counter medicines, vitamins, and herbal products  · Some medicines can affect how this vaccine works  Tell your doctor if you are using any treatment that weakens the immune system, such as cancer medicine, radiation treatment, or a steroid  Warnings While Using This Medicine:   · Tell your doctor if you are pregnant or breastfeeding, or if you have a weak immune system or are allergic to latex  · This vaccine will not protect you against sexually transmitted diseases that are not caused by HPV  · You still need to see your doctor for routine screening tests for anal or cervical cancer even after you receive this vaccine    · You may feel faint, lightheaded, or dizzy right after you receive this vaccine  Your doctor may ask you to wait 15 minutes before standing  Possible Side Effects While Using This Medicine:   Call your doctor right away if you notice any of these side effects:  · Allergic reaction: Itching or hives, swelling in your face or hands, swelling or tingling in your mouth or throat, chest tightness, trouble breathing  · Lightheadedness, dizziness, or fainting  If you notice these less serious side effects, talk with your doctor:   · Headache, tiredness  · Mild fever  · Pain, redness, itching, or swelling where the shot is given  If you notice other side effects that you think are caused by this medicine, tell your doctor  Call your doctor for medical advice about side effects  You may report side effects to FDA at 8-053-NSL-9849  © 2017 [address] [employer] Information is for End User's use only and may not be sold, redistributed or otherwise used for commercial purposes  The above information is an  only  It is not intended as medical advice for individual conditions or treatments  Talk to your doctor, nurse or pharmacist before following any medical regimen to see if it is safe and effective for you  Breast Self Exam for Women   WHAT YOU NEED TO KNOW:   What is a breast self-exam (BSE)? A BSE is a way to check your breasts for lumps and other changes  Regular BSEs can help you know how your breasts normally look and feel  Most breast lumps or changes are not cancer, but you should always have them checked by a healthcare provider  Your healthcare provider can also watch you do a BSE and can tell you if you are doing your BSE correctly  Why should I do a BSE? Breast cancer is the most common type of cancer in women  Even if you have mammograms, you may still want to do a BSE regularly  If you know how your breasts normally feel and look, it may help you know when to contact your healthcare provider  Mammograms can miss some cancers   You may find a lump during a BSE that did not show up on your mammogram   When should I do a BSE? Doc your calendar to help you remember to do BSE on a regular schedule  One easy way to remember to do a BSE is to do the exam on the same day of each month  If you have periods, you may want to do your BSE 1 week after your period ends  This is the time when your breasts may be the least swollen, lumpy, or tender  You can do regular BSEs even if you are breastfeeding or have breast implants  How should I do a BSE? · Look at your breasts in a mirror  Look at the size and shape of each breast and nipple  Check for swelling, lumps, dimpling, scaly skin, or other skin changes  Look for nipple changes, such as a nipple that is painful or beginning to pull inward  Gently squeeze both nipples and check to see if fluid (that is not breast milk) comes out of them  If you find any of these or other breast changes, contact your healthcare provider  Check your breasts while you sit or  the following 3 positions:    [facility] your arms down at your sides  ¨ Raise your hands and join them behind your head  ¨ Put firm pressure with your hands on your hips  Bend slightly forward while you look at your breasts in the mirror  · Lie down and feel your breasts  When you lie down, your breast tissue spreads out evenly over your chest  This makes it easier for you to feel for lumps and anything that may not be normal for your breasts  Do a BSE on one breast at a time  ¨ Place a small pillow or towel under your left shoulder  Put your left arm behind your head  ¨ Use the 3 middle fingers of your right hand  Use your fingertip pads, on the top of your fingers  Your fingertip pad is the most sensitive part of your finger  ¨ Use small circles to feel your breast tissue  Use your fingertip pads to make dime-sized, overlapping circles on your breast and armpits  Use light, medium, and firm pressure   First, press lightly  Second, press with medium pressure to feel a little deeper into the breast  Last, use firm pressure to feel deep within your breast     ¨ Examine your entire breast area  Examine the breast area from above the breast to below the breast where you feel only ribs  Make small circles with your fingertips, starting in the middle of your armpit  Make circles going up and down the breast area  Continue toward your breast and all the way across it  Examine the area from your armpit all the way over to the middle of your chest (breastbone)  Stop at the middle of your chest     ¨ Move the pillow or towel to your right shoulder, and put your right arm behind your head  Use the 3 fingertip pads of your left hand, and repeat the above steps to do a BSE on your right breast        What else can I do to check for breast problems or cancer? Some experts suggest that women 36years of age or older should have a mammogram every year  Other experts suggest that women between the ages of 48and 76years old should have a mammogram every 2 years  Talk to your healthcare provider about when you should have a mammogram   When should I contact my healthcare provider? · You find any lumps or changes in your breasts  · You have breast pain or fluid coming from your nipples  · You have questions or concerns or concerns about your condition or care  CARE AGREEMENT:   You have the right to help plan your care  Learn about your health condition and how it may be treated  Discuss treatment options with your caregivers to decide what care you want to receive  You always have the right to refuse treatment  The above information is an  only  It is not intended as medical advice for individual conditions or treatments  Talk to your doctor, nurse or pharmacist before following any medical regimen to see if it is safe and effective for you    © 2017 [employer] Information is for End User's use only and may not be sold, redistributed or otherwise used for commercial purposes  All illustrations and images included in CareNotes® are the copyrighted property of [employer] , [employer]  or [employer]  Wellness Visit for Adults   WHAT YOU NEED TO KNOW:   What is a wellness visit? A wellness visit is when you see your healthcare provider to get screened for health problems  You can also get advice on how to stay healthy  Write down your questions so you remember to ask them  Ask your healthcare provider how often you should have a wellness visit  What happens at a wellness visit? Your healthcare provider will ask about your health, and your family history of health problems  This includes high blood pressure, heart disease, and cancer  He or she will ask if you have symptoms that concern you, if you smoke, and about your mood  You may also be asked about your intake of medicines, supplements, food, and alcohol  Any of the following may be done:  · Your weight  will be checked  Your height may also be checked so your body mass index (BMI) can be calculated  Your BMI shows if you are at a healthy weight  · Your blood pressure  and heart rate will be checked  Your temperature may also be checked  · Blood and urine tests  may be done  Blood tests may be done to check your cholesterol levels  Abnormal cholesterol levels increase your risk for heart disease and stroke  You may also need a blood or urine test to check for diabetes if you are at increased risk  Urine tests may be done to look for signs of an infection or kidney disease  · A physical exam  includes checking your heartbeat and lungs with a stethoscope  Your healthcare provider may also check your skin to look for sun damage  · Screening tests  may be recommended  A screening test is done to check for diseases that may not cause symptoms   The screening tests you may need depend on your age, gender, family history, and lifestyle habits  For example, colorectal screening may be recommended if you are 48years old or older  What screening tests do I need if I am a woman? · A Pap smear  is used to screen for cervical cancer  Pap smears are usually done every 3 to 5 years depending on your age  You may need them more often if you have had abnormal Pap smear test results in the past  Ask your healthcare provider how often you should have a Pap smear  · A mammogram  is an x-ray of your breasts to screen for breast cancer  Experts recommend mammograms every 2 years starting at age 48 years  You may need a mammogram at age 52 years or younger if you have an increased risk for breast cancer  Talk to your healthcare provider about when you should start having mammograms and how often you need them  What vaccines might I need? · Get an influenza vaccine  every year  The influenza vaccine protects you from the flu  Several types of viruses cause the flu  The viruses change over time, so new vaccines are made each year  · Get a tetanus-diphtheria (Td) booster vaccine  every 10 years  This vaccine protects you against tetanus and diphtheria  Tetanus is a severe infection that may cause painful muscle spasms and lockjaw  Diphtheria is a severe bacterial infection that causes a thick covering in the back of your mouth and throat  · Get a human papillomavirus (HPV) vaccine  if you are female and aged 23 to 32 or male 23 to 24 and never received it  This vaccine protects you from HPV infection  HPV is the most common infection spread by sexual contact  HPV may also cause vaginal, penile, and anal cancers  · Get a pneumococcal vaccine  if you are aged 72 years or older  The pneumococcal vaccine is an injection given to protect you from pneumococcal disease  Pneumococcal disease is an infection caused by pneumococcal bacteria  The infection may cause pneumonia, meningitis, or an ear infection      · Get a shingles vaccine  if you are aged 61 or older, even if you have had shingles before  The shingles vaccine is an injection to protect you from the varicella-zoster virus  This is the same virus that causes chickenpox  Shingles is a painful rash that develops in people who had chickenpox or have been exposed to the virus  How can I eat healthy? My Plate is a model for planning healthy meals  It shows the types and amounts of foods that should go on your plate  Fruits and vegetables make up about half of your plate, and grains and protein make up the other half  A serving of dairy is included on the side of your plate  The amount of calories and serving sizes you need depends on your age, gender, weight, and height  Examples of healthy foods are listed below:  · Eat a variety of vegetables  such as dark green, red, and orange vegetables  You can also include canned vegetables low in sodium (salt) and frozen vegetables without added butter or sauces  · Eat a variety of fresh fruits , canned fruit in 100% juice, frozen fruit, and dried fruit  · Include whole grains  At least half of the grains you eat should be whole grains  Examples include whole-wheat bread, wheat pasta, brown rice, and whole-grain cereals such as oatmeal     · Eat a variety of protein foods such as seafood (fish and shellfish), lean meat, and poultry without skin (turkey and chicken)  Examples of lean meats include pork leg, shoulder, or tenderloin, and beef round, sirloin, tenderloin, and extra lean ground beef  Other protein foods include eggs and egg substitutes, beans, peas, soy products, nuts, and seeds  · Choose low-fat dairy products such as skim or 1% milk or low-fat yogurt, cheese, and cottage cheese  · Limit unhealthy fats  such as butter, hard margarine, and shortening  How much exercise do I need? Exercise at least 30 minutes per day on most days of the week  Some examples of exercise include walking, biking, dancing, and swimming   You can also fit in more physical activity by taking the stairs instead of the elevator or parking farther away from stores  Include muscle strengthening activities 2 days each week  Regular exercise provides many health benefits  It helps you manage your weight, and decreases your risk for type 2 diabetes, heart disease, stroke, and high blood pressure  Exercise can also help improve your mood  Ask your healthcare provider about the best exercise plan for you  What are some general health and safety guidelines I should follow? · Do not smoke  Nicotine and other chemicals in cigarettes and cigars can cause lung damage  Ask your healthcare provider for information if you currently smoke and need help to quit  E-cigarettes or smokeless tobacco still contain nicotine  Talk to your healthcare provider before you use these products  · Limit alcohol  A drink of alcohol is 12 ounces of beer, 5 ounces of wine, or 1½ ounces of liquor  · Lose weight, if needed  Being overweight increases your risk of certain health conditions  These include heart disease, high blood pressure, type 2 diabetes, and certain types of cancer  · Protect your skin  Do not sunbathe or use tanning beds  Use sunscreen with a SPF 15 or higher  Apply sunscreen at least 15 minutes before you go outside  Reapply sunscreen every 2 hours  Wear protective clothing, hats, and sunglasses when you are outside  · Drive safely  Always wear your seatbelt  Make sure everyone in your car wears a seatbelt  A seatbelt can save your life if you are in an accident  Do not use your cell phone when you are driving  This could distract you and cause an accident  Pull over if you need to make a call or send a text message  · Practice safe sex  Use latex condoms if are sexually active and have more than one partner  Your healthcare provider may recommend screening tests for sexually transmitted infections (STIs)      · Wear helmets, lifejackets, and protective gear   Always wear a helmet when you ride a bike or motorcycle, go skiing, or play sports that could cause a head injury  Wear protective equipment when you play sports  Wear a lifejacket when you are on a boat or doing water sports  CARE AGREEMENT:   You have the right to help plan your care  Learn about your health condition and how it may be treated  Discuss treatment options with your caregivers to decide what care you want to receive  You always have the right to refuse treatment  The above information is an  only  It is not intended as medical advice for individual conditions or treatments  Talk to your doctor, nurse or pharmacist before following any medical regimen to see if it is safe and effective for you  © 2017 [employer]  Information is for End User's use only and may not be sold, redistributed or otherwise used for commercial purposes  All illustrations and images included in CareNotes® are the copyrighted property of A [employer]  or [employer]  Levonorgestrel (Into the uterus)   Levonorgestrel (pbj-kwc-jcn-DEB-trel)  Prevents pregnancy and treats heavy menstrual bleeding  This is an intrauterine device (IUD), which is a reversible form of birth control  This IUD slowly releases levonorgestrel, a hormone  Brand Name(s): Larkalpesh Galvin, Mirena, [place]   There may be other brand names for this medicine  When This Medicine Should Not Be Used: This device is not right for everyone  Do not use it if you had an allergic reaction to levonorgestrel, or you are pregnant  How to Use This Medicine:   Device  · The IUD is usually inserted by your doctor during your monthly period  You will need to see your doctor 4 to 6 weeks after the IUD is placed and then once a year  · Your IUD has a string or "tail" that is made of plastic thread  About one or two inches of this string hangs into your vagina   You cannot see this string, and it will not cause problems when you have sex  Check your IUD after each monthly period  You may not be protected against pregnancy if you cannot feel the string or if you feel plastic  Do the following to check the placement of your IUD:  [facility] your hands with soap and warm water  Dry them with a clean towel  ¨ Bend your knees and squat low to the ground  ¨ Gently put your index finger high inside your vagina  The cervix is at the top of the vagina  Find the IUD string coming from your cervix  Never pull on the string  You should not be able to feel the plastic of the IUD itself  Wash your hands after you are done checking your IUD string  · Your doctor will need to replace your IUD after 3 years for [facility] or [place], or after 5 years for [facility] or [place] [id number]  You will also need to have it replaced if it comes out of your uterus  Drugs and Foods to Avoid:   Ask your doctor or pharmacist before using any other medicine, including over-the-counter medicines, vitamins, and herbal products  · Some medicines can affect how this device works  Tell your doctor if you are using a blood thinner (including warfarin)  Warnings While Using This Medicine:   · Tell your doctor if you are breastfeeding, or you have had a baby, miscarriage, or  in the past 3 months  Tell your doctor if you have liver disease (including tumor or cancer), breast cancer, heart or blood circulation problems, including a history of heart valve problems, heart disease, blood clotting problems, stroke, heart attack, or high blood pressure  Tell your doctor if you have problems with your immune system or have had surgery on your female organs (especially fallopian tubes)  · Tell your doctor if you have had any problems, infections, or other conditions that affected your reproductive system   There are many problems that could make an IUD a bad choice for you, including if you have fibroids, unexplained bleeding, a uterus that has an unusual shape, a recent infection, pelvic inflammatory disease, abnormal Pap test, ectopic pregnancy, cancer or suspected cancer, or an existing IUD  · There is a small chance that you could get pregnant when using an IUD, just as there is with any birth control  If you get pregnant, your doctor may remove your IUD to lower the risk of miscarriage or other problems  · This medicine may cause the following problems:  ¨ Increased risk of ectopic pregnancy (pregnancy outside the uterus)  ¨ Increased risk of a serious infection called pelvic inflammatory disease (PID)  ¨ Increased risk for ovarian cysts  ¨ Perforation (hole in the wall of your uterus), which can damage other organs  · You might have some spotting and cramping during the first weeks after the IUD has been inserted  These symptoms should decrease or go away within a few weeks up to 6 months  · You could have less bleeding or even stop having periods by the end of the first year  Call your doctor if you have a change from the regular bleeding pattern after you have had your IUD for awhile, such as more bleeding or if you miss a period (and you were having periods even with your IUD)  · An IUD can slip partly or all of the way out of your uterus  If this happens, use condoms or another form of birth control, and call your doctor right away  · This IUD will not protect you from HIV/AIDS, herpes, or other sexually transmitted diseases    · If you have the Dheeraj Harness or Jen Gerald, tell your healthcare provider before you have an MRI test   Possible Side Effects While Using This Medicine:   Call your doctor right away if you notice any of these side effects:  · Allergic reaction: Itching or hives, swelling in your face or hands, swelling or tingling in your mouth or throat, chest tightness, trouble breathing  · Chest pain, problems with speech or walking, numbness or weakness in your arm or leg or on one side of your body  · Heavy bleeding from your vagina  · Pain during sex, or if your partner feels the hard plastic of the IUD during sex  · Severe headache, vision changes  · Stomach or pelvic pain, tenderness, or cramping that is sudden or severe  · Vaginal discharge has a bad smell, fever, chills, sores on your genitals  · Yellow skin or eyes  If you notice these less serious side effects, talk with your doctor:   · Acne or other skin changes  · Breast pain  · Change in bleeding pattern after the first few months  · Dizziness or lightheadedness after IUD is placed  · Mild itching around your vagina and genitals  If you notice other side effects that you think are caused by this medicine, tell your doctor  Call your doctor for medical advice about side effects  You may report side effects to FDA at 9-665-FDA-1116  © 2017 [address] [employer] Information is for End User's use only and may not be sold, redistributed or otherwise used for commercial purposes  The above information is an  only  It is not intended as medical advice for individual conditions or treatments  Talk to your doctor, nurse or pharmacist before following any medical regimen to see if it is safe and effective for you

## 2020-08-12 ENCOUNTER — TELEPHONE (OUTPATIENT)
Dept: OBGYN CLINIC | Facility: CLINIC | Age: 30
End: 2020-08-12

## 2020-08-13 ENCOUNTER — OFFICE VISIT (OUTPATIENT)
Dept: OBGYN CLINIC | Facility: CLINIC | Age: 30
End: 2020-08-13

## 2020-08-13 VITALS
BODY MASS INDEX: 22.64 KG/M2 | SYSTOLIC BLOOD PRESSURE: 108 MMHG | DIASTOLIC BLOOD PRESSURE: 70 MMHG | TEMPERATURE: 98 F | HEART RATE: 92 BPM | WEIGHT: 122.8 LBS | RESPIRATION RATE: 16 BRPM

## 2020-08-13 DIAGNOSIS — R10.2 PELVIC PAIN: Primary | ICD-10-CM

## 2020-08-13 DIAGNOSIS — N94.10 DYSPAREUNIA, FEMALE: ICD-10-CM

## 2020-08-13 PROCEDURE — 99213 OFFICE O/P EST LOW 20 MIN: CPT | Performed by: OBSTETRICS & GYNECOLOGY

## 2020-08-13 PROCEDURE — 1036F TOBACCO NON-USER: CPT | Performed by: OBSTETRICS & GYNECOLOGY

## 2020-08-13 NOTE — PROGRESS NOTES
Subjective:     Chucky Baig is a 31 y o  [id number] female who presents with dyspareunia  Patient has an IUD in place that was placed on 10/25/19  This pain started shortly after placement and occurs only during sexual intercourse  She describes the pain as sharp in nature that occurs intermittently mostly on the right side  Pain resolves when sexual intercourse ends  The pain does not occur with every sexual encounter but with most  She denies any bleeding, vaginal discharge, or foul odor  Patient reports risk of STD as low as she has been with the same partner in a monogamous relationship  Objective:    Vitals: Blood pressure 108/70, pulse 92, temperature 98 °F (36 7 °C), resp  rate 16, weight 55 7 kg (122 lb 12 8 oz), not currently breastfeeding  Body mass index is 22 64 kg/m²  Physical Exam  Exam conducted with a chaperone present  Genitourinary:     Labia:         Right: No rash, tenderness or lesion  Left: No rash, tenderness or lesion  Vagina: No vaginal discharge, erythema, tenderness or bleeding  Cervix: No cervical motion tenderness, discharge, friability, lesion or erythema        Uterus: Normal        Adnexa: Right adnexa normal and left adnexa normal                Assessment/Plan:    Dyspareunia:   -Speculum exam revealed IUD strings in place, no discharge or bleeding present and no lesion visible on vulva or vaginal wall   -Plan for transvaginal ultrasound to establish where IUD is located in the uterus   -Return to office for results         Nahid Desai MD  8/13/2020  11:15 AM

## 2020-08-22 ENCOUNTER — HOSPITAL ENCOUNTER (OUTPATIENT)
Dept: ULTRASOUND IMAGING | Facility: HOSPITAL | Age: 30
Discharge: HOME | End: 2020-08-22
Payer: COMMERCIAL

## 2020-08-22 DIAGNOSIS — R10.2 PELVIC PAIN: ICD-10-CM

## 2020-08-22 PROCEDURE — 76830 TRANSVAGINAL US NON-OB: CPT

## 2020-08-22 PROCEDURE — 76856 US EXAM PELVIC COMPLETE: CPT

## 2021-01-11 ENCOUNTER — IMMUNIZATIONS (OUTPATIENT)
Dept: FAMILY MEDICINE CLINIC | Facility: HOSPITAL | Age: 31
End: 2021-01-11

## 2021-01-11 DIAGNOSIS — Z23 ENCOUNTER FOR IMMUNIZATION: ICD-10-CM

## 2021-01-11 PROCEDURE — 0001A SARS-COV-2 / COVID-19 MRNA VACCINE (PFIZER-BIONTECH) 30 MCG: CPT

## 2021-01-11 PROCEDURE — 91300 SARS-COV-2 / COVID-19 MRNA VACCINE (PFIZER-BIONTECH) 30 MCG: CPT

## 2021-02-05 ENCOUNTER — IMMUNIZATIONS (OUTPATIENT)
Dept: FAMILY MEDICINE CLINIC | Facility: HOSPITAL | Age: 31
End: 2021-02-05

## 2021-02-05 DIAGNOSIS — Z23 ENCOUNTER FOR IMMUNIZATION: Primary | ICD-10-CM

## 2021-02-05 PROCEDURE — 0002A SARS-COV-2 / COVID-19 MRNA VACCINE (PFIZER-BIONTECH) 30 MCG: CPT

## 2021-02-05 PROCEDURE — 91300 SARS-COV-2 / COVID-19 MRNA VACCINE (PFIZER-BIONTECH) 30 MCG: CPT

## 2021-02-17 ENCOUNTER — OFFICE VISIT (OUTPATIENT)
Dept: OBGYN CLINIC | Facility: CLINIC | Age: 31
End: 2021-02-17

## 2021-02-17 VITALS
DIASTOLIC BLOOD PRESSURE: 78 MMHG | SYSTOLIC BLOOD PRESSURE: 121 MMHG | WEIGHT: 123 LBS | BODY MASS INDEX: 22.63 KG/M2 | HEIGHT: 62 IN | HEART RATE: 94 BPM

## 2021-02-17 DIAGNOSIS — Z30.432 ENCOUNTER FOR REMOVAL OF INTRAUTERINE CONTRACEPTIVE DEVICE (IUD): ICD-10-CM

## 2021-02-17 DIAGNOSIS — Z97.5 IUD (INTRAUTERINE DEVICE) IN PLACE: Primary | ICD-10-CM

## 2021-02-17 LAB
SL AMB  POCT GLUCOSE, UA: NEGATIVE
SL AMB LEUKOCYTE ESTERASE,UA: NEGATIVE
SL AMB POCT BILIRUBIN,UA: NEGATIVE
SL AMB POCT BLOOD,UA: ABNORMAL
SL AMB POCT KETONES,UA: ABNORMAL
SL AMB POCT NITRITE,UA: NEGATIVE
SL AMB POCT PH,UA: 5
SL AMB POCT SPECIFIC GRAVITY,UA: 1.02
SL AMB POCT URINE HCG: NEGATIVE
SL AMB POCT URINE PROTEIN: NEGATIVE
SL AMB POCT UROBILINOGEN: NEGATIVE

## 2021-02-17 PROCEDURE — 58301 REMOVE INTRAUTERINE DEVICE: CPT | Performed by: OBSTETRICS & GYNECOLOGY

## 2021-02-17 PROCEDURE — 81002 URINALYSIS NONAUTO W/O SCOPE: CPT | Performed by: OBSTETRICS & GYNECOLOGY

## 2021-02-17 PROCEDURE — 81025 URINE PREGNANCY TEST: CPT | Performed by: OBSTETRICS & GYNECOLOGY

## 2021-02-17 RX ORDER — NORETHINDRONE ACETATE AND ETHINYL ESTRADIOL 1MG-20(21)
1 KIT ORAL DAILY
Qty: 28 TABLET | Refills: 12 | Status: SHIPPED | OUTPATIENT
Start: 2021-02-17 | End: 2022-02-04

## 2021-02-17 NOTE — PROGRESS NOTES
Subjective  Patient is a 26 yo [id number] who presents today with pain she believes is related to her IUD  She had a Mirena IUD inserted 10/25/19  She subsequently had pain that occurred only during intercourse, sharp in nature, and mostly on the right side  The pain ended at the conclusion of intercourse  A transvaginal ultrasound was performed, which revealed an IUD located at the fundus and no other abnormalities  She continues to have pain that now persists after intercourse and is mostly on the left  She would like the IUD removed and would like to switch to OCPs  She is also going to talk to her fiance about a vasectomy  OB History        4    Para   2    Term   2       0    AB   2    Living   2       SAB   1    TAB   1    Ectopic   0    Multiple   0    Live Births   2                        Objective    Vitals:    21 1541   BP: 121/78   Pulse: 94   Weight: 55 8 kg (123 lb)   Height: 5' 2" (1 575 m)        Physical Exam  Constitutional:       Appearance: She is well-developed  Genitourinary:      No vaginal discharge  Genitourinary Comments: IUD strings visible at cervix   HENT:      Head: Normocephalic and atraumatic  Eyes:      Conjunctiva/sclera: Conjunctivae normal    Cardiovascular:      Rate and Rhythm: Normal rate  Pulmonary:      Effort: Pulmonary effort is normal  No respiratory distress  Abdominal:      Palpations: Abdomen is soft  Tenderness: There is no abdominal tenderness  There is no guarding or rebound  Musculoskeletal: Normal range of motion  Neurological:      Mental Status: She is alert and oriented to person, place, and time  Skin:     General: Skin is warm and dry  Psychiatric:         Behavior: Behavior normal          Thought Content:  Thought content normal           Assessment    Patient Active Problem List   Diagnosis    Rosacea    IUD (intrauterine device) in place        Plan  - IUD removed today  - OCPs sent to office  - Reyes Days patient to return if symptoms persist

## 2021-02-17 NOTE — PROGRESS NOTES
Iud removal    Date/Time: 2/17/2021 5:38 PM  Performed by: Harsh Serrato MD  Authorized by: Harsh Serrato MD   [place] Protocol:  Procedure performed by:  Consent: Verbal consent obtained  Written consent obtained  Consent given by: patient  Time out: Immediately prior to procedure a "time out" was called to verify the correct patient, procedure, equipment, support staff and site/side marked as required    Patient understanding: patient states understanding of the procedure being performed  Patient consent: the patient's understanding of the procedure matches consent given  Patient identity confirmed: verbally with patient      Procedure:     Removed with no complications: yes      Other reason for removal:  Pain

## 2021-04-08 ENCOUNTER — ANNUAL EXAM (OUTPATIENT)
Dept: OBGYN CLINIC | Facility: CLINIC | Age: 31
End: 2021-04-08

## 2021-04-08 VITALS
SYSTOLIC BLOOD PRESSURE: 102 MMHG | HEIGHT: 62 IN | BODY MASS INDEX: 22.6 KG/M2 | DIASTOLIC BLOOD PRESSURE: 71 MMHG | WEIGHT: 122.8 LBS | HEART RATE: 99 BPM

## 2021-04-08 DIAGNOSIS — Z30.41 ENCOUNTER FOR SURVEILLANCE OF CONTRACEPTIVE PILLS: ICD-10-CM

## 2021-04-08 DIAGNOSIS — Z12.4 SCREENING FOR CERVICAL CANCER: ICD-10-CM

## 2021-04-08 DIAGNOSIS — Z01.419 ENCOUNTER FOR GYNECOLOGICAL EXAMINATION WITHOUT ABNORMAL FINDING: Primary | ICD-10-CM

## 2021-04-08 PROCEDURE — G0124 SCREEN C/V THIN LAYER BY MD: HCPCS | Performed by: PATHOLOGY

## 2021-04-08 PROCEDURE — 99395 PREV VISIT EST AGE 18-39: CPT | Performed by: NURSE PRACTITIONER

## 2021-04-08 PROCEDURE — 87624 HPV HI-RISK TYP POOLED RSLT: CPT | Performed by: NURSE PRACTITIONER

## 2021-04-08 PROCEDURE — G0145 SCR C/V CYTO,THINLAYER,RESCR: HCPCS | Performed by: PATHOLOGY

## 2021-04-08 NOTE — PATIENT INSTRUCTIONS
HPV (Human Papillomavirus) Vaccine for Adults   WHAT YOU NEED TO KNOW:   What is the human papillomavirus (HPV) vaccine? The HPV vaccine is an injection given to females and males to protect against human papillomavirus infection  HPV is most commonly spread through sexual activity  It can also be spread from a mother to her baby during delivery  The HPV vaccine is most effective if given before sexual activity begins  This allows your body to build almost complete protection against HPV before you have contact with the virus  The HPV vaccine is still effective after sexual activity has begun  How is the vaccine given? The HPV vaccine can be given with other vaccines  The vaccine is given in 2 or 3 doses through age 32:  · The first dose  is given at any time  · The second dose  is given 1 to 2 months after the first dose  · The third dose  is given 6 months after the first dose  What else do I need to know about how the vaccine is given? You may need 1 more dose of the HPV vaccine if any of the following is true:  · You only received 1 dose of the HPV vaccine before 13years of age  · You received 2 doses of the HPV vaccine less than 5 months apart before 13years of age  What are reasons I should not get the HPV vaccine, or should wait to get it? · You had a severe allergic reaction to a dose of the vaccine  · You are pregnant  Your healthcare provider will tell you when you can get the vaccine  · You are sick or have a fever  You may need to wait to get the vaccine until symptoms go away  What are the risks of the HPV vaccine? You may have pain, redness, or swelling where the shot was given  You may have a fever or headache  You may have an allergic reaction to the vaccine  This can be life-threatening  Call your local emergency number (911 in the [address] [place] [address],[address]) if:   · You have signs of a severe allergic reaction, such as trouble breathing, hives, or wheezing        When should I seek immediate care? · You have a high fever or behavior changes that concern you  When should I call my doctor? · You have questions or concerns about the HPV vaccine  CARE AGREEMENT:   You have the right to help plan your care  Learn about your health condition and how it may be treated  Discuss treatment options with your healthcare providers to decide what care you want to receive  You always have the right to refuse treatment  The above information is an  only  It is not intended as medical advice for individual conditions or treatments  Talk to your doctor, nurse or pharmacist before following any medical regimen to see if it is safe and effective for you  © Copyright [address] Information is for End User's use only and may not be sold, redistributed or otherwise used for commercial purposes   All illustrations and images included in CareNotes® are the copyrighted property of A D A M , Inc  or [address] [place] [address]

## 2021-04-08 NOTE — PROGRESS NOTES
ASSESSMENT & PLAN: Farida Bowman is a 27 y o  [id number] with normal gynecologic exam     1   Routine well woman exam done today  2  Pap and HPV:  The patient's last pap was  2018  It was normal     Pap and cotesting was done today  Current ASCCP Guidelines reviewed  Will call results patient  3  The following were reviewed in today's visit: breast self exam, STD testing, use and side effects of OCPs, family planning choices, adequate intake of calcium and vitamin D, exercise and healthy diet  4    Junel for contraception- 1 year prescription was placed 2021  5  Gardisil vaccine in women up to age 39 discussed and information was provided  CC:  Annual Gynecologic Examination    HPI: Farida Bowman is a 27 y o  [id number] who presents for annual gynecologic examination  Her last Pap was 2018 and was negative, Pap and HPV were done today denies abnormal Pap history  Mirena IUD  Was removed 2021 due to pain with intercourse  Pain completely resolved,  She is very happy with her decision to have removed at    she was placed on Junel- prescription for 1 year  Menses are regular last 4-5 days  Health Maintenance:    She wears her seatbelt routinely  She does perform irregular monthly self breast exams  She feels safe at home  Past Medical History:   Diagnosis Date    Allergic     seasonal-dust, pollen, weeds, grass    Varicella        Past Surgical History:   Procedure Laterality Date    RHINOPLASTY      VAGINAL DELIVERY         Past OB/Gyn History:  OB History        4    Para   2    Term   2       0    AB   2    Living   2       SAB   1    TAB   1    Ectopic   0    Multiple   0    Live Births   2               Pt does not have menstrual issues  Regular OCPs   History of sexually transmitted infection: No   History of abnormal pap smears: No      Patient is currently sexually active    heterosexual   The current method of family planning is OCP (estrogen/progesterone)  Family History   Problem Relation Age of Onset    Diabetes Mother     Hypertension Mother     Seizures Mother    [facility] Depression Mother     Heart disease Father     Hypertension Father     Depression Brother     Depression Son     ADD / ADHD Son     No Known Problems Sister     Diabetes Maternal Grandmother     Diabetes Paternal Grandmother     Depression Sister     No Known Problems Son        Social History:  Social History     Socioeconomic History    Marital status: Single     Spouse name: Not on file    Number of children: Not on file    Years of education: Not on file    Highest education level: Not on file   Occupational History    Not on file   Social Needs    Financial resource strain: Not very hard    Food insecurity     Worry: Never true     Inability: Never true    Transportation needs     Medical: No     Non-medical: No   Tobacco Use    Smoking status: Never Smoker    Smokeless tobacco: Never Used   Substance and Sexual Activity    Alcohol use: Yes     Frequency: Monthly or less     Drinks per session: 1 or 2     Binge frequency: Never     Comment: socially    Drug use: No    Sexual activity: Yes     Partners: Male     Birth control/protection: I U D  Lifestyle    Physical activity     Days per week: 0 days     Minutes per session: 0 min    Stress: Only a little   Relationships    Social connections     Talks on phone: Once a week     Gets together: Once a week     Attends Hinduism service: Never     Active member of club or organization: No     Attends meetings of clubs or organizations: Never     Relationship status: Living with partner    Intimate partner violence     Fear of current or ex partner: No     Emotionally abused: No     Physically abused: No     Forced sexual activity: No   Other Topics Concern    Not on file   Social History Narrative    Not on file     Presently lives with  family  Patient is single        Allergies Allergen Reactions    Dust Mite Extract     Pollen Extract     Tree Extract          Current Outpatient Medications:     levonorgestrel (MIRENA) 20 MCG/24HR IUD, 1 each by Intrauterine route once, Disp: , Rfl:     Multiple Vitamin (MULTIVITAMIN) capsule, Take 1 capsule by mouth daily, Disp: , Rfl:     norethindrone-ethinyl estradiol (JUNEL FE 1/20) 1-20 MG-MCG per tablet, Take 1 tablet by mouth daily, Disp: 28 tablet, Rfl: 12      Review of Systems  Constitutional :no fever, feels well, no tiredness, no recent weight gain or loss  ENT: no ear ache, no loss of hearing, no nosebleeds or nasal discharge, no sore throat or hoarseness  Cardiovascular: no complaints of slow or fast heart beat, no chest pain, no palpitations, no leg claudication or lower extremity edema  Respiratory: no complaints of shortness of shortness of breath, no TALLEY  Breasts:no complaints of breast pain, breast lump, or nipple discharge  Gastrointestinal: no complaints of abdominal pain, constipation, nausea, vomiting, or diarrhea or bloody stools  Genitourinary : no complaints of dysuria, incontinence, pelvic pain, no dysmenorrhea, vaginal discharge or abnormal vaginal bleeding and as noted in HPI  Musculoskeletal: no complaints of arthralgia, no myalgia, no joint swelling or stiffness, no limb pain or swelling  Integumentary: no complaints of skin rash or lesion, itching or dry skin  Neurological: no complaints of headache, no confusion, no numbness or tingling, no dizziness or fainting    Objective      There were no vitals taken for this visit    General:   appears stated age, cooperative, alert normal mood and affect   Neck: normal, supple,trachea midline, no masses   Heart: regular rate and rhythm, S1, S2 normal, no murmur, click, rub or gallop   Lungs: clear to auscultation bilaterally   Breasts: normal appearance, no masses or tenderness, Inspection negative, No nipple retraction or dimpling, No nipple discharge or bleeding, No axillary or supraclavicular adenopathy, Normal to palpation without dominant masses, Taught monthly breast self examination   Abdomen: soft, non-tender, without masses or organomegaly   Vulva: normal female genitalia, Bartholin's, Urethra, [place] normal   Vagina: normal vagina, no discharge, exudate, lesion, or erythema   Urethra: normal   Cervix: Normal, no discharge  PAP done  Nontender  Uterus: normal size, contour, position, consistency, mobility, non-tender and anteverted   Adnexa: normal adnexa and no mass, fullness, tenderness   Lymphatic palpation of lymph nodes in neck, axilla, groin and/or other locations: no lymphadenopathy or masses noted   Skin normal skin turgor and no rashes     Psychiatric orientation to person, place, and time: normal  mood and affect: normal

## 2021-04-13 LAB
HPV HR 12 DNA CVX QL NAA+PROBE: NEGATIVE
HPV16 DNA CVX QL NAA+PROBE: NEGATIVE
HPV18 DNA CVX QL NAA+PROBE: NEGATIVE

## 2021-04-15 LAB
LAB AP GYN PRIMARY INTERPRETATION: ABNORMAL
Lab: ABNORMAL
PATH INTERP SPEC-IMP: ABNORMAL

## 2021-04-22 ENCOUNTER — TELEPHONE (OUTPATIENT)
Dept: OBGYN CLINIC | Facility: CLINIC | Age: 31
End: 2021-04-22

## 2021-04-22 DIAGNOSIS — B37.3 VAGINAL YEAST INFECTION: Primary | ICD-10-CM

## 2021-04-22 RX ORDER — FLUCONAZOLE 150 MG/1
150 TABLET ORAL ONCE
Qty: 1 TABLET | Refills: 0 | Status: SHIPPED | OUTPATIENT
Start: 2021-04-22 | End: 2021-04-22

## 2021-04-22 NOTE — TELEPHONE ENCOUNTER
----- Message from Inés Pfeiffer Casia  sent at 4/21/2021  1:08 PM EDT -----  Please call patient  To review that her Pap was LGSIL, but negative all high-risk HPV, recommend Pap and Co test in 1 year,  Previous negative Pap history  Please inform that her Pap showed yeast, please inquire if patient has symptoms if does would treat    thanks

## 2021-04-22 NOTE — PROGRESS NOTES
Yeast on Pap patient desires treatment due to symptoms,  Fluconazole 150 mg x 1 dose sent to pharmacy

## 2021-05-18 ENCOUNTER — TELEMEDICINE (OUTPATIENT)
Dept: FAMILY MEDICINE CLINIC | Facility: OTHER | Age: 31
End: 2021-05-18
Payer: COMMERCIAL

## 2021-05-18 VITALS — BODY MASS INDEX: 22.45 KG/M2 | TEMPERATURE: 97.6 F | HEIGHT: 62 IN | WEIGHT: 122 LBS
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
## 2021-05-18 DIAGNOSIS — S39.92XA TAILBONE INJURY, INITIAL ENCOUNTER: ICD-10-CM

## 2021-05-18 PROCEDURE — 99214 OFFICE O/P EST MOD 30 MIN: CPT | Performed by: FAMILY MEDICINE
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
## 2021-05-19 NOTE — PROGRESS NOTES
Assessment/Plan:    No problem-specific Assessment & Plan notes found for this encounter  Diagnoses and all orders for this visit:    Vaginal leukorrhea  -     POCT wet mount  Results all negative for BV, yeast and Trichomonas  Patient reassured normal discharge  Vaginal discharge          Annual 4/08/2022    Subjective:      Patient ID: Chucky Baig is a 27 y o  female  HPI 28 yo  [id number] with yeast on pap and was treated with fluconizole 150 mgs x 1 dose 04/22/2021  patient reports discharge white, thick and had a green tinge  Patient denies any itching,   Feels discomfort after urinating at times,  denies a fishy odor  Denies any pelvic pain,  Fever or chills  Denies any urgency or frequency   same partner,  consents to  chlamydia gonorrhea  she is on OCPs for contraception,  Previously she had her Mirena IUD removed 02/17/2021    The following portions of the patient's history were reviewed and updated as appropriate: allergies, current medications, past family history, past medical history, past social history, past surgical history and problem list     Review of Systems   Respiratory: Negative  Cardiovascular: Negative  Genitourinary: Positive for vaginal discharge  Negative for dysuria, frequency, pelvic pain and urgency  Objective:      /81 (BP Location: Left arm, Patient Position: Sitting, Cuff Size: Adult)   Pulse 102   Ht 5' 2" (1 575 m)   Wt 56 1 kg (123 lb 9 6 oz)   LMP 02/17/2021   BMI 22 61 kg/m²          Physical Exam  Constitutional:       Appearance: Normal appearance  Cardiovascular:      Rate and Rhythm: Normal rate and regular rhythm  Pulmonary:      Effort: Pulmonary effort is normal       Breath sounds: Normal breath sounds  Abdominal:      Palpations: Abdomen is soft  Tenderness: There is no abdominal tenderness  Skin:     General: Skin is warm and dry     Psychiatric:         Mood and Affect: Mood normal          Behavior: Behavior normal        external genitalia-no lesions no erythema  Vagina-small a white discharge  Cervix-negative CMT no lesions  Uterus-anteverted, nontender  Adnexa-no masses nontender    Wet mount KOH-all negative

## 2021-05-20 ENCOUNTER — OFFICE VISIT (OUTPATIENT)
Dept: OBGYN CLINIC | Facility: CLINIC | Age: 31
End: 2021-05-20

## 2021-05-20 ENCOUNTER — HOSPITAL ENCOUNTER (OUTPATIENT)
Dept: RADIOLOGY | Facility: HOSPITAL | Age: 31
Discharge: HOME | End: 2021-05-20
Payer: COMMERCIAL

## 2021-05-20 VITALS
BODY MASS INDEX: 22.74 KG/M2 | SYSTOLIC BLOOD PRESSURE: 124 MMHG | HEIGHT: 62 IN | HEART RATE: 102 BPM | WEIGHT: 123.6 LBS | DIASTOLIC BLOOD PRESSURE: 81 MMHG
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
## 2021-05-20 DIAGNOSIS — Z72.51 HIGH RISK HETEROSEXUAL BEHAVIOR: ICD-10-CM

## 2021-05-20 DIAGNOSIS — N89.8 VAGINAL LEUKORRHEA: Primary | ICD-10-CM

## 2021-05-20 DIAGNOSIS — S39.92XA TAILBONE INJURY, INITIAL ENCOUNTER: ICD-10-CM

## 2021-05-20 DIAGNOSIS — N89.8 VAGINAL DISCHARGE: ICD-10-CM

## 2021-05-20 PROCEDURE — 72220 X-RAY EXAM SACRUM TAILBONE: CPT

## 2021-05-20 PROCEDURE — 87210 SMEAR WET MOUNT SALINE/INK: CPT | Performed by: NURSE PRACTITIONER

## 2021-05-20 PROCEDURE — 99213 OFFICE O/P EST LOW 20 MIN: CPT | Performed by: NURSE PRACTITIONER

## 2021-05-21 PROBLEM — N89.8 VAGINAL LEUKORRHEA: Status: ACTIVE | Noted: 2021-05-21

## 2021-05-21 PROBLEM — N89.8 VAGINAL DISCHARGE: Status: ACTIVE | Noted: 2021-05-21

## 2021-05-21 LAB
BV WHIFF TEST VAG QL: NEGATIVE
CLUE CELLS SPEC QL WET PREP: NEGATIVE
PH SMN: 4 [PH]
SL AMB POCT WET MOUNT: NEGATIVE
T VAGINALIS VAG QL WET PREP: NEGATIVE
YEAST VAG QL WET PREP: NEGATIVE

## 2021-05-21 PROCEDURE — 87491 CHLMYD TRACH DNA AMP PROBE: CPT | Performed by: NURSE PRACTITIONER

## 2021-05-21 PROCEDURE — 87591 N.GONORRHOEAE DNA AMP PROB: CPT | Performed by: NURSE PRACTITIONER

## 2021-05-22 LAB
C TRACH DNA SPEC QL NAA+PROBE: NEGATIVE
N GONORRHOEA DNA SPEC QL NAA+PROBE: NEGATIVE

## 2021-05-25 ENCOUNTER — TELEPHONE (OUTPATIENT)
Dept: OBGYN CLINIC | Facility: CLINIC | Age: 31
End: 2021-05-25

## 2021-05-25 NOTE — TELEPHONE ENCOUNTER
----- Message from Inés Mayorga sent at 5/24/2021  9:27 PM EDT -----  Result is negative, please call patient to inform

## 2021-05-28 ENCOUNTER — TELEPHONE (OUTPATIENT)
Dept: FAMILY MEDICINE CLINIC | Facility: OTHER | Age: 31
End: 2021-05-28

## 2021-05-28 NOTE — TELEPHONE ENCOUNTER
----- Message from Abraham Redmond MD sent at 5/28/2021 11:05 AM EDT -----  Du Mckeon shows no fracture or dislocations  For now follow recommendation per office visit and follow up as needed  Thanks

## 2021-06-03 ENCOUNTER — TELEMEDICINE (OUTPATIENT)
Dept: FAMILY MEDICINE CLINIC | Facility: OTHER | Age: 31
End: 2021-06-03
Payer: COMMERCIAL

## 2021-06-03 DIAGNOSIS — J01.00 ACUTE NON-RECURRENT MAXILLARY SINUSITIS: Primary | ICD-10-CM

## 2021-06-03 PROCEDURE — 99213 OFFICE O/P EST LOW 20 MIN: CPT | Performed by: FAMILY MEDICINE

## 2021-06-03 RX ORDER — AMOXICILLIN AND CLAVULANATE POTASSIUM 875; 125 MG/1; MG/1
1 TABLET, FILM COATED ORAL EVERY 12 HOURS SCHEDULED
Qty: 14 TABLET | Refills: 0 | Status: SHIPPED | OUTPATIENT
Start: 2021-06-03 | End: 2021-06-10

## 2021-06-03 NOTE — PROGRESS NOTES
Virtual Regular Visit      Assessment/Plan:    Problem List Items Addressed This Visit     None      Visit Diagnoses     Acute non-recurrent maxillary sinusitis    -  Primary  - Patient reports rhinorrhea, congestion and sinus pressure/pain x 2weeks  - Symptoms have not improved with OTC decongestations and conservative management  - Will trial on amoxicillin-clavulanate (Augmentin) 875-125 mg per tablet             Reason for visit is   Chief Complaint   Patient presents with    Sinus Problem     for 2 weeks     Headache    Virtual Regular Visit        Encounter provider Ansley Johnston MD    Provider located at [address] [address]      Recent Visits  Date Type Provider Dept   05/28/21 Telephone [facility]   Showing recent visits within past 7 days and meeting all other requirements     Today's Visits  Date Type Provider Dept   06/03/21 Telemedicine MD Reggie Mercer   Showing today's visits and meeting all other requirements     Future Appointments  No visits were found meeting these conditions  Showing future appointments within next 150 days and meeting all other requirements        The patient was identified by name and date of birth  Rere Lainez was informed that this is a telemedicine visit and that the visit is being conducted through [facility] and patient was informed that this is a secure, HIPAA-compliant platform  She agrees to proceed     My office door was closed  No one else was in the room  She acknowledged consent and understanding of privacy and security of the video platform  The patient has agreed to participate and understands they can discontinue the visit at any time  Patient is aware this is a billable service  Subjective  Rere Lainez is a 27 y o  female     Sinus Problem  This is a new problem   Episode onset: Patient reports symptoms started about 2 weeks ago, during which she had rhinorrhea, sneezing and congestion  Patient does have hx of seasonal allergies  The problem has been gradually worsening since onset  There has been no fever  Associated symptoms include congestion, sinus pressure and sneezing  Pertinent negatives include no chills, ear pain, headaches or shortness of breath  Past treatments include oral decongestants and acetaminophen  The treatment provided no relief  Past Medical History:   Diagnosis Date    Allergic     seasonal-dust, pollen, weeds, grass    Varicella        Past Surgical History:   Procedure Laterality Date    RHINOPLASTY      VAGINAL DELIVERY         Current Outpatient Medications   Medication Sig Dispense Refill    Multiple Vitamin (MULTIVITAMIN) capsule Take 1 capsule by mouth daily      norethindrone-ethinyl estradiol (JUNEL FE 1/20) 1-20 MG-MCG per tablet Take 1 tablet by mouth daily 28 tablet 12     No current facility-administered medications for this visit  Allergies   Allergen Reactions    Dust Mite Extract     Pollen Extract     Tree Extract        Review of Systems   Constitutional: Negative for chills  HENT: Positive for congestion, rhinorrhea, sinus pressure, sinus pain and sneezing  Negative for ear pain  Eyes: Negative for pain  Respiratory: Negative for shortness of breath  Neurological: Negative for dizziness and headaches  Video Exam    There were no vitals filed for this visit  Physical Exam  HENT:      Head: Normocephalic and atraumatic  Nose: Rhinorrhea present  Comments: Patient experiences tenderness to palpation of maxillary sinuses  Eyes:      Conjunctiva/sclera: Conjunctivae normal    Neck:      Musculoskeletal: Normal range of motion and neck supple  Pulmonary:      Effort: Pulmonary effort is normal    Neurological:      Mental Status: She is alert and oriented to person, place, and time     Psychiatric:         Mood and Affect: Mood normal  I spent 7 minutes directly with the patient during this visit      VIRTUAL VISIT DISCLAIMER    Maury Cao acknowledges that she has consented to an online visit or consultation  She understands that the online visit is based solely on information provided by her, and that, in the absence of a face-to-face physical evaluation by the physician, the diagnosis she receives is both limited and provisional in terms of accuracy and completeness  This is not intended to replace a full medical face-to-face evaluation by the physician  Maury Cao understands and accepts these terms

## 2021-06-07 ENCOUNTER — TELEPHONE (OUTPATIENT)
Dept: PSYCHIATRY | Facility: CLINIC | Age: 31
End: 2021-06-07

## 2021-06-07 ENCOUNTER — TELEPHONE (OUTPATIENT)
Dept: FAMILY MEDICINE CLINIC | Facility: OTHER | Age: 31
End: 2021-06-07

## 2021-06-07 DIAGNOSIS — F32.A DEPRESSION, UNSPECIFIED DEPRESSION TYPE: Primary | ICD-10-CM

## 2021-06-07 NOTE — TELEPHONE ENCOUNTER
Behavorial Health Outpatient Intake Questions    Referred by: PCP    Please advised interviewee that they need to answer all questions truthfully to allow for best care and any misrepresentations of information may affect their ability to be seen at this clinic   => Was this discussed? Yes     Behavorial Health Outpatient Intake History -     Presenting Problem (in patient's words): Patient reports depression Is getting worse and she is feeling a lot of overwhelming things  Patient feels she may have PTSD  Are there any developmental disabilities? ? If yes, can they speak to you on the phone? If they are too limited to speak to you on phone, refer out No    Are you taking any psychiatric medications? No    => If yes, who prescribes? If yes, are they injectable medications? Does the patient have a language barrier or hearing impairment? No    Have you been treated at [address] [place] [address] by a therapist or a doctor in the past? If yes, who? No    Has the patient been hospitalized for mental health? No   If yes, how long ago was last hospitalization and where was it? Do you actively use alcohol or marijuana or illegal substances? If yes, what and how much - refer out to Drug and alcohol treatment if use is excessive or daily use of illegal substances No concerns of substance abuse are reported  Do you have a community treatment team or ? No    Legal History-     Does the patient have any history of arrests, nursing home/penitentiary time, or DUIs? No  If Yes-  1) What types of charges? 2) When were they last incarcerated? 3) Are they currently on parole or probation? Minor Child-    Who has custody of the child? Is there a custody agreement? If there is a custody agreement remind parent that they must bring a copy to the first appt or they will not be seen       Intake Team, please check with provider before scheduling if flags come up such as:  - complex case  - legal history (other than DUI)  - communication barrier concerns are present  - if, in your judgment, this needs further review    ACCEPTED as a patient Yes  => Appointment Date: TBD on wait list for therapy     Referred Elsewhere? No    Name of Insurance Co: [employer] ID# [id number]  [id number] Phone #  If ins is primary or secondary  If patient is a minor, parents information such as Name, D  O B of guarantor

## 2021-06-07 NOTE — TELEPHONE ENCOUNTER
Will place referral! Can we please also schedule a visit to check-in with patient even if it's a telemedicine visit  Just so we can make sure she doesn't need anything urgently? Thanks!     Dr ALMAGUER

## 2021-06-07 NOTE — TELEPHONE ENCOUNTER
Patient called and is interested in services I told her to contact her PCP to get a referral and then we will forward it over to intake once we receive it

## 2021-06-07 NOTE — TELEPHONE ENCOUNTER
Patient called and needs a Dr to  referral for \[facility] [place] [facility]\"" SURGICAL SPECIALTY [facility] for depression and PTSD   Please advise

## 2021-06-17 ENCOUNTER — TELEMEDICINE (OUTPATIENT)
Dept: FAMILY MEDICINE CLINIC | Facility: OTHER | Age: 31
End: 2021-06-17
Payer: COMMERCIAL

## 2021-06-17 DIAGNOSIS — F32.A DEPRESSION, UNSPECIFIED DEPRESSION TYPE: Primary | ICD-10-CM

## 2021-06-17 PROCEDURE — 99213 OFFICE O/P EST LOW 20 MIN: CPT | Performed by: FAMILY MEDICINE
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
## 2021-08-18 ENCOUNTER — OFFICE VISIT (OUTPATIENT)
Dept: OBGYN CLINIC | Facility: CLINIC | Age: 31
End: 2021-08-18

## 2021-08-18 VITALS
SYSTOLIC BLOOD PRESSURE: 131 MMHG | HEART RATE: 82 BPM | HEIGHT: 62 IN | WEIGHT: 127 LBS | DIASTOLIC BLOOD PRESSURE: 90 MMHG | BODY MASS INDEX: 23.37 KG/M2

## 2021-08-18 DIAGNOSIS — R10.2 PELVIC PAIN: Primary | ICD-10-CM

## 2021-08-18 DIAGNOSIS — Z72.51 HIGH RISK HETEROSEXUAL BEHAVIOR: ICD-10-CM

## 2021-08-18 DIAGNOSIS — B96.89 BACTERIAL VAGINOSIS: ICD-10-CM

## 2021-08-18 DIAGNOSIS — N76.0 BACTERIAL VAGINOSIS: ICD-10-CM

## 2021-08-18 DIAGNOSIS — Z11.3 SCREEN FOR STD (SEXUALLY TRANSMITTED DISEASE): ICD-10-CM

## 2021-08-18 LAB
SL AMB  POCT GLUCOSE, UA: NORMAL
SL AMB LEUKOCYTE ESTERASE,UA: NORMAL
SL AMB POCT BILIRUBIN,UA: NORMAL
SL AMB POCT BLOOD,UA: NORMAL
SL AMB POCT CLARITY,UA: CLEAR
SL AMB POCT COLOR,UA: YELLOW
SL AMB POCT KETONES,UA: NORMAL
SL AMB POCT NITRITE,UA: NORMAL
SL AMB POCT PH,UA: 7
SL AMB POCT SPECIFIC GRAVITY,UA: 1.02
SL AMB POCT URINE HCG: NORMAL
SL AMB POCT URINE PROTEIN: NORMAL
SL AMB POCT UROBILINOGEN: 0.2

## 2021-08-18 PROCEDURE — 87491 CHLMYD TRACH DNA AMP PROBE: CPT | Performed by: OBSTETRICS & GYNECOLOGY

## 2021-08-18 PROCEDURE — 87086 URINE CULTURE/COLONY COUNT: CPT | Performed by: OBSTETRICS & GYNECOLOGY

## 2021-08-18 PROCEDURE — 87591 N.GONORRHOEAE DNA AMP PROB: CPT | Performed by: OBSTETRICS & GYNECOLOGY

## 2021-08-18 PROCEDURE — 81025 URINE PREGNANCY TEST: CPT | Performed by: OBSTETRICS & GYNECOLOGY

## 2021-08-18 PROCEDURE — 81002 URINALYSIS NONAUTO W/O SCOPE: CPT | Performed by: OBSTETRICS & GYNECOLOGY

## 2021-08-18 PROCEDURE — 99213 OFFICE O/P EST LOW 20 MIN: CPT | Performed by: OBSTETRICS & GYNECOLOGY

## 2021-08-18 RX ORDER — METRONIDAZOLE 500 MG/1
500 TABLET ORAL EVERY 12 HOURS SCHEDULED
Qty: 14 TABLET | Refills: 0 | Status: SHIPPED | OUTPATIENT
Start: 2021-08-18 | End: 2021-08-25

## 2021-08-18 NOTE — PROGRESS NOTES
OB/GYN VISIT  Sandra Escalante  2021  5:09 PM    Subjective:     Sandra Escalante is a 27 y o  [id number] female who presents for pelvic pain and discharge starting 2 weeks ago  She missed her period, and is [facility] through her placebo pill pack of Junel  She takes her OCPs regularly, and has not missed any pills  She had a negative pregnancy test at home  She has sexual intercourse with her boyfriend, they do not use condoms  She complains of thin white discharge without an odor  She complains of mild suprapubic pressure  Has not tried medications for the pelvic pain  Menstrual History:  OB History        4    Para   2    Term   2       0    AB   2    Living   2       SAB   1    TAB   1    Ectopic   0    Multiple   0    Live Births   2                Patient's last menstrual period was 2021 (approximate)  Past Medical History:   Diagnosis Date    Allergic     seasonal-dust, pollen, weeds, grass    Varicella      Past Surgical History:   Procedure Laterality Date    RHINOPLASTY      VAGINAL DELIVERY         Objective:    Vitals: Blood pressure 131/90, pulse 82, height 5' 2" (1 575 m), weight 57 6 kg (127 lb), last menstrual period 2021, not currently breastfeeding  Body mass index is 23 23 kg/m²  Physical Exam  Constitutional:       Appearance: Normal appearance  She is normal weight  HENT:      Mouth/Throat:      Mouth: Mucous membranes are moist    Cardiovascular:      Rate and Rhythm: Normal rate  Abdominal:      General: Abdomen is flat  Palpations: Abdomen is soft  Genitourinary:     General: Normal vulva  Vagina: Vaginal discharge (thin white discharge, no odor, clue cells seen on wet mount, no cervical motion tenderness) present  Neurological:      Mental Status: She is alert and oriented to person, place, and time     Psychiatric:         Mood and Affect: Mood normal          Behavior: Behavior normal            Assessment/Plan:  Problem List Items Addressed This Visit        Genitourinary    Bacterial vaginosis - Primary     - Clue cells seen on wet mount  - Flagyl BID x7 days sent to her pharmacy  - Discussed to avoid alcohol with flagyl usage  - Will also send gc/ct per her request  - UPT negative here         Relevant Medications    metroNIDAZOLE (FLAGYL) 500 mg tablet          D/w Dr Hu Del Rosario, DO  8/18/2021  5:09 PM

## 2021-08-18 NOTE — PATIENT INSTRUCTIONS
Bacterial Vaginosis   WHAT YOU NEED TO KNOW:   Bacterial vaginosis is an infection in the vagina  It may cause vaginitis (irritation and inflammation of the vagina)  The cause is not known  Bacteria normally found in the vagina are imbalanced  Your risk increases if you are sexually active, you use a douche, or you have an intrauterine device (IUD)  DISCHARGE INSTRUCTIONS:   Call your doctor or gynecologist if:   · Your symptoms come back or do not improve with treatment  · You have vaginal bleeding that is not your monthly period  · You have questions or concerns about your condition or care  Medicines:   · Antibiotics are given to kill the bacteria  They may be given as a pill or a cream to put in your vagina  · Take your medicine as directed  Contact your healthcare provider if you think your medicine is not helping or if you have side effects  Tell him of her if you are allergic to any medicine  Keep a list of the medicines, vitamins, and herbs you take  Include the amounts, and when and why you take them  Bring the list or the pill bottles to follow-up visits  Carry your medicine list with you in case of an emergency  Bacterial vaginosis and pregnancy: If you have bacterial vaginosis during pregnancy, your baby may be born early or have a low birth weight  Your healthcare provider may recommend testing for bacterial vaginosis before or during your pregnancy  He or she will talk to you about your risk for premature delivery, and make sure you know the benefits and risks of testing  Prevent bacterial vaginosis:     · Keep your vaginal area clean and dry  Wear underwear and pantyhose with a cotton crotch  Wipe from front to back after you urinate or have a bowel movement  After you bathe, rinse soap from your vaginal area to decrease your risk for irritation  · Do not use products that cause irritation  Always use unscented tampons or sanitary pads   Do not use feminine sprays, powders, or scented tampons  They may cause irritation and increase your risk for vaginosis  Detergents and fabric softeners may also cause irritation  · Do not use a douche  This can cause an imbalance in healthy vaginal bacteria  · Use latex condoms during sex  This helps prevent another infection and keeps your partner from getting the infection  Follow up with your doctor or gynecologist as directed: Bacterial vaginosis increases the risk for several health problems, such as pelvic inflammatory disease (PID) or sexually transmitted infections  Work with your healthcare providers to schedule regular appointments to check for health problems  Write down your questions so you remember to ask them during your visits  © Copyright [employer] 2021 Information is for End User's use only and may not be sold, redistributed or otherwise used for commercial purposes  All illustrations and images included in CareNotes® are the copyrighted property of [employer]  or Aundrea Mendez  The above information is an  only  It is not intended as medical advice for individual conditions or treatments  Talk to your doctor, nurse or pharmacist before following any medical regimen to see if it is safe and effective for you

## 2021-08-19 DIAGNOSIS — A74.9 CHLAMYDIA: Primary | ICD-10-CM

## 2021-08-19 LAB
C TRACH DNA SPEC QL NAA+PROBE: POSITIVE
N GONORRHOEA DNA SPEC QL NAA+PROBE: NEGATIVE

## 2021-08-19 RX ORDER — AZITHROMYCIN 500 MG/1
1000 TABLET, FILM COATED ORAL ONCE
Qty: 2 TABLET | Refills: 0 | Status: SHIPPED | OUTPATIENT
Start: 2021-08-19 | End: 2021-08-19

## 2021-08-19 NOTE — RESULT ENCOUNTER NOTE
George Olivier,    Your test was positive for chlamydia, so I am going to give you a prescription for azithromycin  I would like to see you back in 2 weeks for a re-check  Any partners you've had should also be tested, if they don't have a family doctor, let me know and I can send them a prescription as well   Best, Dr Alejandro Blakely

## 2021-08-21 LAB — BACTERIA UR CULT: NORMAL

## 2021-08-24 ENCOUNTER — TELEPHONE (OUTPATIENT)
Dept: OBGYN CLINIC | Facility: CLINIC | Age: 31
End: 2021-08-24

## 2021-08-24 DIAGNOSIS — A74.9 CHLAMYDIA: Primary | ICD-10-CM

## 2021-08-24 RX ORDER — AZITHROMYCIN 500 MG/1
1000 TABLET, FILM COATED ORAL ONCE
Qty: 2 TABLET | Refills: 0 | Status: SHIPPED | OUTPATIENT
Start: 2021-08-24 | End: 2021-08-24
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
## 2021-12-09 ENCOUNTER — OFFICE VISIT (OUTPATIENT)
Dept: FAMILY MEDICINE CLINIC | Facility: OTHER | Age: 31
End: 2021-12-09
Payer: COMMERCIAL

## 2021-12-09 VITALS
WEIGHT: 125 LBS | SYSTOLIC BLOOD PRESSURE: 118 MMHG | BODY MASS INDEX: 23 KG/M2 | HEIGHT: 62 IN | DIASTOLIC BLOOD PRESSURE: 78 MMHG | OXYGEN SATURATION: 97 % | RESPIRATION RATE: 16 BRPM | TEMPERATURE: 98 F

## 2021-12-09 DIAGNOSIS — N92.6 MENSTRUAL ABNORMALITY: ICD-10-CM

## 2021-12-09 DIAGNOSIS — T78.40XS ALLERGY, SEQUELA: ICD-10-CM

## 2021-12-09 DIAGNOSIS — R14.0 ABDOMINAL BLOATING: Primary | ICD-10-CM

## 2021-12-09 LAB
SL AMB  POCT GLUCOSE, UA: NORMAL
SL AMB LEUKOCYTE ESTERASE,UA: NORMAL
SL AMB POCT BILIRUBIN,UA: NORMAL
SL AMB POCT BLOOD,UA: 25
SL AMB POCT CLARITY,UA: CLEAR
SL AMB POCT COLOR,UA: YELLOW
SL AMB POCT KETONES,UA: 5
SL AMB POCT NITRITE,UA: NORMAL
SL AMB POCT PH,UA: 6
SL AMB POCT SPECIFIC GRAVITY,UA: 1.02
SL AMB POCT URINE HCG: NORMAL
SL AMB POCT URINE PROTEIN: NORMAL
SL AMB POCT UROBILINOGEN: 0.2

## 2021-12-09 PROCEDURE — 81003 URINALYSIS AUTO W/O SCOPE: CPT | Performed by: FAMILY MEDICINE

## 2021-12-09 PROCEDURE — 99213 OFFICE O/P EST LOW 20 MIN: CPT | Performed by: FAMILY MEDICINE

## 2021-12-09 PROCEDURE — 81025 URINE PREGNANCY TEST: CPT | Performed by: FAMILY MEDICINE

## 2021-12-09 RX ORDER — CETIRIZINE HYDROCHLORIDE 10 MG/1
10 TABLET ORAL DAILY
Qty: 30 TABLET | Refills: 1 | Status: SHIPPED | OUTPATIENT
Start: 2021-12-09 | End: 2022-04-07

## 2022-01-10 ENCOUNTER — OFFICE VISIT (OUTPATIENT)
Dept: FAMILY MEDICINE CLINIC | Facility: OTHER | Age: 32
End: 2022-01-10
Payer: COMMERCIAL

## 2022-01-10 VITALS
WEIGHT: 122 LBS | DIASTOLIC BLOOD PRESSURE: 72 MMHG | TEMPERATURE: 98 F | SYSTOLIC BLOOD PRESSURE: 110 MMHG | OXYGEN SATURATION: 99 % | BODY MASS INDEX: 22.45 KG/M2 | HEIGHT: 62 IN | RESPIRATION RATE: 16 BRPM | HEART RATE: 92 BPM

## 2022-01-10 DIAGNOSIS — R14.0 ABDOMINAL BLOATING: Primary | ICD-10-CM

## 2022-01-10 PROCEDURE — 99213 OFFICE O/P EST LOW 20 MIN: CPT | Performed by: FAMILY MEDICINE

## 2022-01-10 NOTE — PROGRESS NOTES
Assessment/Plan:    No problem-specific Assessment & Plan notes found for this encounter  Diagnoses and all orders for this visit:    Abdominal bloating    - Patient returns for follow-up of abdominal bloating and intermittent constipation, overall symptoms have slightly improved with small dietary changes ie gluten free bread, increasing fiber intake  Patient may also introduce OTC probiotics into her diet  - Patient wishes to continue conservative measures as symptoms are not distressing at this time  - Counseled to follow up if symptoms acutely worsen, will consider imaging/ GI follow-up as needed or appropriate     Subjective:      Patient ID: Kati Pham is a 34 y o  female  Patient reports she trialed gluten free diet x 1 week and noticed a small difference in bloating  Patient also reports she noticed some improvement with BM now every 2-3 days with the addition of Metamucil  Patient denies any concerning features of constipation, ie abdominal pain, straining, bloody BM secondary to hemorrhoids  She wishes to continue on current conservative management, which is reasonable given lack of symptoms    The following portions of the patient's history were reviewed and updated as appropriate: allergies, current medications, past family history, past medical history, past social history, past surgical history and problem list     Review of Systems   Constitutional: Negative for unexpected weight change  Respiratory: Negative for chest tightness and shortness of breath  Cardiovascular: Negative for chest pain and palpitations  Gastrointestinal: Positive for constipation  Negative for abdominal distention, abdominal pain, blood in stool, nausea and vomiting  Objective:      /72   Pulse 92   Temp 98 °F (36 7 °C)   Resp 16   Ht 5' 2" (1 575 m)   Wt 55 3 kg (122 lb)   SpO2 99%   BMI 22 31 kg/m²          Physical Exam  Constitutional:       Appearance: Normal appearance  HENT:      Head: Normocephalic and atraumatic  Cardiovascular:      Rate and Rhythm: Normal rate and regular rhythm  Heart sounds: Normal heart sounds  Pulmonary:      Effort: Pulmonary effort is normal       Breath sounds: Normal breath sounds  Abdominal:      General: Bowel sounds are normal  There is no distension  Palpations: Abdomen is soft  There is no mass  Tenderness: There is no abdominal tenderness  Neurological:      Mental Status: She is alert and oriented to person, place, and time  Psychiatric:         Mood and Affect: Mood normal          Thought Content:  Thought content normal

## 2022-01-26 ENCOUNTER — TELEPHONE (OUTPATIENT)
Dept: PSYCHIATRY | Facility: CLINIC | Age: 32
End: 2022-01-26

## 2022-02-04 DIAGNOSIS — Z30.432 ENCOUNTER FOR REMOVAL OF INTRAUTERINE CONTRACEPTIVE DEVICE (IUD): ICD-10-CM

## 2022-02-04 RX ORDER — NORETHINDRONE ACETATE AND ETHINYL ESTRADIOL AND FERROUS FUMARATE 1MG-20(21)
KIT ORAL
Qty: 28 TABLET | Refills: 12 | Status: SHIPPED | OUTPATIENT
Start: 2022-02-04 | End: 2022-05-06 | Stop reason: HOSPADM

## 2022-03-10 ENCOUNTER — OFFICE VISIT (OUTPATIENT)
Dept: FAMILY MEDICINE CLINIC | Facility: OTHER | Age: 32
End: 2022-03-10
Payer: COMMERCIAL

## 2022-03-10 VITALS
BODY MASS INDEX: 22.63 KG/M2 | HEART RATE: 90 BPM | SYSTOLIC BLOOD PRESSURE: 112 MMHG | DIASTOLIC BLOOD PRESSURE: 78 MMHG | RESPIRATION RATE: 16 BRPM | OXYGEN SATURATION: 97 % | HEIGHT: 62 IN | WEIGHT: 123 LBS | TEMPERATURE: 98 F

## 2022-03-10 DIAGNOSIS — R00.2 PALPITATIONS: Primary | ICD-10-CM

## 2022-03-10 DIAGNOSIS — F41.1 GENERALIZED ANXIETY DISORDER: ICD-10-CM

## 2022-03-10 PROCEDURE — 99213 OFFICE O/P EST LOW 20 MIN: CPT | Performed by: FAMILY MEDICINE

## 2022-03-10 RX ORDER — ESCITALOPRAM OXALATE 5 MG/1
5 TABLET ORAL DAILY
Qty: 90 TABLET | Refills: 0 | Status: SHIPPED | OUTPATIENT
Start: 2022-03-10 | End: 2022-04-07 | Stop reason: ALTCHOICE

## 2022-03-10 NOTE — PROGRESS NOTES
Assessment/Plan:    No problem-specific Assessment & Plan notes found for this encounter  Diagnoses and all orders for this visit:    Palpitations  - Patient with complaint of acute onset chest palpitations x1 week, episodes notably intermittent with the longest lasting about 30 minutes  Patient does endorse SOB with these episodes  - She reports father has a significant history of cardiac disease which did require pacemaker placement in his 46s  No other history of sudden cardiac noted  -Discussed with patient that well symptoms may be consistent with underlying anxiety will obtain a 24 hour Holter monitor to assess for any possible underlying a arrhythmias, or conduction issues  - Holter monitor; Future    Generalized anxiety disorder  - Performed JEANNE-7 screening with patient to assess if presenting symptoms are indicative of underlying anxiety  JEANNE score of 19 consistent with severe anxiety  - Discussed long-term and short-term pharmacotherapy along with option of behavioral health  Patient has opted to pursue both pharmacologic therapy and BPH  - Start at 5mg of lexapro and titrate up as tolerated RTO in 4 weeks    - escitalopram (LEXAPRO) 5 mg tablet; Take 1 tablet (5 mg total) by mouth daily        - Ambulatory Referral to [facility]; Future          Subjective:      Patient ID: Chucky Baig is a 34 y o  female  Patient presents with complaint of chest palpitations x1 week recent workup completed at [facility] on 03/09/2022 including EKG normal sinus rhythm and lab work showing CBC within normal limits BMP within normal limits and negative pregnancy  Patient did bring all records with her which were reviewed during today's visit  Of note, TSH also completed at urgent care and found to be within normal limits at 1 67  Patient reports palpitations have been intermittent with the longest episodes occurring for about 30 minutes    Patient denies any chest pain, but does endorse shortness of breath with these episodes  Patient denies any heat intolerance, diarrhea, or unintentional weight loss  Patient does report father has hx of cardiac disease which required pacemaker in his mid 46s but is unsure of the diagnosis  Of note, patient does report significant stressors surrounding her family life and states that in the last week things have been particularly difficult at home  She also reports some significant stress at work  Patient did complete JEANNE during today's visit which reviewed together as it shows concern for severe anxiety  Patient does report that she is aware of her anxiety and has been trialing counseling through different providers ( ie online counselors)  However, she states her experience has been negative thus far and unfortunately has not been helpful today  JEANNE-7 Flowsheet Screening      Most Recent Value   Over the last 2 weeks, how often have you been bothered by any of the following problems? Feeling nervous, anxious, or on edge 3   Not being able to stop or control worrying 3   Worrying too much about different things 3   Trouble relaxing 3   Being so restless that it is hard to sit still 2   Becoming easily annoyed or irritable 3   Feeling afraid as if something awful might happen 2   JEANNE-7 Total Score 19          The following portions of the patient's history were reviewed and updated as appropriate: allergies, current medications, past family history, past medical history, past social history, past surgical history and problem list     Review of Systems   Eyes: Negative for visual disturbance  Respiratory: Positive for shortness of breath  Negative for chest tightness  Cardiovascular: Positive for palpitations  Negative for chest pain  Gastrointestinal: Negative for nausea and vomiting  Endocrine: Negative for heat intolerance  Neurological: Negative for tremors and headaches     Psychiatric/Behavioral: The patient is nervous/anxious  Objective:      /78   Pulse 90   Temp 98 °F (36 7 °C)   Resp 16   Ht 5' 2" (1 575 m)   Wt 55 8 kg (123 lb)   SpO2 97%   BMI 22 50 kg/m²          Physical Exam  Constitutional:       Appearance: She is well-developed  HENT:      Head: Normocephalic and atraumatic  Nose: Nose normal    Eyes:      Conjunctiva/sclera: Conjunctivae normal    Cardiovascular:      Rate and Rhythm: Normal rate and regular rhythm  Heart sounds: Normal heart sounds  Pulmonary:      Effort: Pulmonary effort is normal       Breath sounds: Normal breath sounds  Abdominal:      General: Bowel sounds are normal       Palpations: Abdomen is soft  Musculoskeletal:      Cervical back: Normal range of motion and neck supple  Skin:     General: Skin is warm and dry  Neurological:      Mental Status: She is alert and oriented to person, place, and time

## 2022-03-10 NOTE — PATIENT INSTRUCTIONS
Anxiety   AMBULATORY CARE:   Anxiety  is a condition that causes you to feel extremely worried or nervous  The feelings are so strong that they can cause problems with your daily activities or sleep  Anxiety may be triggered by something you fear, or it may happen without a cause  Family or work stress, smoking, caffeine, and alcohol can increase your risk for anxiety  Certain medicines or health conditions can also increase your risk  Anxiety can become a long-term condition if it is not managed or treated  Common signs and symptoms that may occur with anxiety:   · Fatigue or muscle tightness    · Shaking, restlessness, or irritability    · Problems focusing    · Trouble sleeping    · Feeling jumpy, easily startled, or dizzy    · Rapid heartbeat or shortness of breath    Call your local emergency number (911 in the [address]) if:   · You have chest pain, tightness, or heaviness that may spread to your shoulders, arms, jaw, neck, or back  · You feel like hurting yourself or someone else  Call your doctor if:   · Your symptoms get worse or do not get better with treatment  · Your anxiety keeps you from doing your regular daily activities  · You have new symptoms since your last visit  · You have questions or concerns about your condition or care  Treatment for anxiety  may include medicines to help you feel calm and relaxed, and decrease your symptoms  Medicines are usually given together with therapy or other treatments  Manage anxiety:   · Talk to someone about your anxiety  Your healthcare provider may suggest counseling  Cognitive behavioral therapy can help you understand and change how you react to events that trigger your symptoms  You might feel more comfortable talking with a friend or family member about your anxiety  Choose someone you know will be supportive and encouraging  · Find ways to relax  Activities such as exercise, meditation, or listening to music can help you relax   Spend time with friends, or do things you enjoy  · Practice deep breathing  Deep breathing can help you relax when you feel anxious  Focus on taking slow, deep breaths several times a day, or during an anxiety attack  Breathe in through your nose and out through your mouth  · Create a regular sleep routine  Regular sleep can help you feel calmer during the day  Go to sleep and wake up at the same times every day  Do not watch television or use the computer right before bed  Your room should be comfortable, dark, and quiet  · Eat a variety of healthy foods  Healthy foods include fruits, vegetables, low-fat dairy products, lean meats, fish, whole-grain breads, and cooked beans  Healthy foods can help you feel less anxious and have more energy  · Exercise regularly  Exercise can increase your energy level  Exercise may also lift your mood and help you sleep better  Your healthcare provider can help you create an exercise plan  · Do not smoke  Nicotine and other chemicals in cigarettes and cigars can increase anxiety  Ask your healthcare provider for information if you currently smoke and need help to quit  E-cigarettes or smokeless tobacco still contain nicotine  Talk to your healthcare provider before you use these products  · Do not have caffeine  Caffeine can make your symptoms worse  Do not have foods or drinks that are meant to increase your energy level  · Limit or do not drink alcohol  Ask your healthcare provider if alcohol is safe for you  You may not be able to drink alcohol if you take certain anxiety or depression medicines  Limit alcohol to 1 drink per day if you are a woman  Limit alcohol to 2 drinks per day if you are a man  A drink of alcohol is 12 ounces of beer, 5 ounces of wine, or 1½ ounces of liquor  · Do not use drugs  Drugs can make your anxiety worse  It can also make anxiety hard to manage   Talk to your healthcare provider if you use drugs and want help to quit     Follow up with your doctor within 2 weeks or as directed:  Write down your questions so you remember to ask them during your visits  © Copyright [employer] 2022 Information is for End User's use only and may not be sold, redistributed or otherwise used for commercial purposes  All illustrations and images included in CareNotes® are the copyrighted property of A D A M , Inc  or [facility] Jhon [employer]   The above information is an  only  It is not intended as medical advice for individual conditions or treatments  Talk to your doctor, nurse or pharmacist before following any medical regimen to see if it is safe and effective for you

## 2022-03-18 ENCOUNTER — HOSPITAL ENCOUNTER (OUTPATIENT)
Dept: NON INVASIVE DIAGNOSTICS | Facility: HOSPITAL | Age: 32
Discharge: HOME | End: 2022-03-18
Payer: COMMERCIAL

## 2022-03-18 ENCOUNTER — OFFICE VISIT (OUTPATIENT)
Dept: OBGYN CLINIC | Facility: CLINIC | Age: 32
End: 2022-03-18

## 2022-03-18 ENCOUNTER — SOCIAL WORK (OUTPATIENT)
Dept: BEHAVIORAL/MENTAL HEALTH CLINIC | Facility: CLINIC | Age: 32
End: 2022-03-18
Payer: COMMERCIAL

## 2022-03-18 VITALS
WEIGHT: 122 LBS | HEART RATE: 93 BPM | BODY MASS INDEX: 22.45 KG/M2 | HEIGHT: 62 IN | SYSTOLIC BLOOD PRESSURE: 119 MMHG | DIASTOLIC BLOOD PRESSURE: 88 MMHG

## 2022-03-18 DIAGNOSIS — N89.8 VAGINAL DISCHARGE: ICD-10-CM

## 2022-03-18 DIAGNOSIS — Z11.3 SCREENING FOR STD (SEXUALLY TRANSMITTED DISEASE): Primary | ICD-10-CM

## 2022-03-18 DIAGNOSIS — N76.0 BACTERIAL VAGINOSIS: ICD-10-CM

## 2022-03-18 DIAGNOSIS — B96.89 BACTERIAL VAGINOSIS: ICD-10-CM

## 2022-03-18 DIAGNOSIS — F32.A ANXIETY AND DEPRESSION: Primary | ICD-10-CM

## 2022-03-18 DIAGNOSIS — F41.9 ANXIETY AND DEPRESSION: Primary | ICD-10-CM

## 2022-03-18 DIAGNOSIS — R00.2 PALPITATIONS: ICD-10-CM

## 2022-03-18 PROCEDURE — 87491 CHLMYD TRACH DNA AMP PROBE: CPT | Performed by: OBSTETRICS & GYNECOLOGY

## 2022-03-18 PROCEDURE — 93225 XTRNL ECG REC<48 HRS REC: CPT

## 2022-03-18 PROCEDURE — 90834 PSYTX W PT 45 MINUTES: CPT | Performed by: SOCIAL WORKER

## 2022-03-18 PROCEDURE — 93226 XTRNL ECG REC<48 HR SCAN A/R: CPT

## 2022-03-18 PROCEDURE — 99213 OFFICE O/P EST LOW 20 MIN: CPT | Performed by: OBSTETRICS & GYNECOLOGY

## 2022-03-18 PROCEDURE — 87591 N.GONORRHOEAE DNA AMP PROB: CPT | Performed by: OBSTETRICS & GYNECOLOGY

## 2022-03-18 RX ORDER — METRONIDAZOLE 500 MG/1
500 TABLET ORAL EVERY 12 HOURS SCHEDULED
Qty: 14 TABLET | Refills: 0 | Status: SHIPPED | OUTPATIENT
Start: 2022-03-18 | End: 2022-03-25

## 2022-03-19 LAB
C TRACH DNA SPEC QL NAA+PROBE: NEGATIVE
N GONORRHOEA DNA SPEC QL NAA+PROBE: NEGATIVE
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
## 2022-03-21 NOTE — PROGRESS NOTES
[address]  [facility]  PROBLEM VISIT  Name: Sarah Singleton  MRN: [id number]  : 1990      ASSESSMENT/PLAN:  Problem List Items Addressed This Visit        Genitourinary    Bacterial vaginosis    Relevant Medications    metroNIDAZOLE (FLAGYL) 500 mg tablet       Other    Vaginal discharge    Relevant Medications    metroNIDAZOLE (FLAGYL) 500 mg tablet      Other Visit Diagnoses     Screening for STD (sexually transmitted disease)    -  Primary    Relevant Orders    Chlamydia/GC amplified DNA by PCR (Completed)          SUBJECTIVE:  31yo [id number] w/ report of lower pelvic cramping and vaginal discharge  Symptoms x 3 days  She uses junel for contraception  Was seen for similar symptoms in August and was diagnosed w/ BV  OBJECTIVE:  Vitals:    22 0910   BP: 119/88   Pulse: 93       Physical Exam  Constitutional:       General: She is not in acute distress  Appearance: She is not ill-appearing  Cardiovascular:      Rate and Rhythm: Normal rate  Pulmonary:      Effort: Pulmonary effort is normal  No respiratory distress  Genitourinary:     Comments:   Vaginal discharge noted, +whiff, pH 5, clue cells on wet mount   Skin:     General: Skin is warm and dry  Neurological:      Mental Status: She is alert and oriented to person, place, and time     Psychiatric:         Mood and Affect: Mood normal          Behavior: Behavior normal            Samuel Mejía MD  OB/GYN PGY-4  3/21/2022  4:54 PM

## 2022-03-23 PROCEDURE — 93227 XTRNL ECG REC<48 HR R&I: CPT | Performed by: INTERNAL MEDICINE

## 2022-04-01 ENCOUNTER — OFFICE VISIT (OUTPATIENT)
Dept: OBGYN CLINIC | Facility: CLINIC | Age: 32
End: 2022-04-01

## 2022-04-01 ENCOUNTER — SOCIAL WORK (OUTPATIENT)
Dept: BEHAVIORAL/MENTAL HEALTH CLINIC | Facility: CLINIC | Age: 32
End: 2022-04-01
Payer: COMMERCIAL

## 2022-04-01 VITALS
SYSTOLIC BLOOD PRESSURE: 95 MMHG | HEART RATE: 92 BPM | HEIGHT: 62 IN | BODY MASS INDEX: 22.45 KG/M2 | WEIGHT: 122 LBS | DIASTOLIC BLOOD PRESSURE: 65 MMHG

## 2022-04-01 DIAGNOSIS — R87.612 LOW GRADE SQUAMOUS INTRAEPITHELIAL LESION ON CYTOLOGIC SMEAR OF CERVIX (LGSIL): ICD-10-CM

## 2022-04-01 DIAGNOSIS — F32.1 CURRENT MODERATE EPISODE OF MAJOR DEPRESSIVE DISORDER, UNSPECIFIED WHETHER RECURRENT (HCC): Primary | ICD-10-CM

## 2022-04-01 DIAGNOSIS — Z30.2 REQUEST FOR STERILIZATION: Primary | ICD-10-CM

## 2022-04-01 PROCEDURE — 90834 PSYTX W PT 45 MINUTES: CPT | Performed by: SOCIAL WORKER

## 2022-04-01 PROCEDURE — 99213 OFFICE O/P EST LOW 20 MIN: CPT | Performed by: OBSTETRICS & GYNECOLOGY

## 2022-04-01 NOTE — PROGRESS NOTES
[address]  [facility]  Name: Dyan Tello  MRN: [id number]  : 1990      ASSESSMENT/PLAN:  Problem List Items Addressed This Visit        Other    Request for sterilization - Primary     Discussed risks of surgical procedures, including risk of bleeding, blood clots, infection, injury to soft tissue or surrounding organs, permanent disability, or even death  Risk of regret was also discussed  Discussed procedure in detail  Patient demonstrated understanding and all questions were answered to her satisfaction  Patient agrees to proceed w/ surgery  MA31 and surgical consent form signed for laparoscopic bilateral salpingectomy  Patient to return for H&P pending approval          Low grade squamous intraepithelial lesion on cytologic smear of cervix (LGSIL)     LGSIL and HPV negative in   Due for pap, declined today  Plan to collect pap at H&P visit  SUBJECTIVE:  31yo [id number] interested in permament sterilization  Patient understands that salpingectomy is intended for permanent sterilization and is not intended to be a reversible form of birth control  Patient understands that though this procedure is intended to provide permanent sterilization, there is still a chance for failure of the procedure and that she may become pregnant in the future despite salpingectomy, with an increased risk of ectopic pregnancy were she to become pregnant postprocedure, and that this is considered an abnormal pregnancy  Patient understands that there are alternative forms of birth control including abstinence, condoms or other barrier methods, OCPs, depo provera injection, ring, patch, Nexplanon, IUD - all of which are reversible and would allow for attempt of future pregnancy  She has declined these methods of birth control  Patient also declines vasectomy of partner as a method of birth control          OBJECTIVE:  Vitals:    22 0832   BP: 95/65   Pulse: 92         Karla Adan MD  OB/GYN PGY-4  4/1/2022  3:47 PM

## 2022-04-04 PROBLEM — R87.612 LOW GRADE SQUAMOUS INTRAEPITHELIAL LESION ON CYTOLOGIC SMEAR OF CERVIX (LGSIL): Status: ACTIVE | Noted: 2022-04-04

## 2022-04-04 NOTE — ASSESSMENT & PLAN NOTE
Discussed risks of surgical procedures, including risk of bleeding, blood clots, infection, injury to soft tissue or surrounding organs, permanent disability, or even death  Risk of regret was also discussed  Discussed procedure in detail  Patient demonstrated understanding and all questions were answered to her satisfaction  Patient agrees to proceed w/ surgery  MA31 and surgical consent form signed for laparoscopic bilateral salpingectomy   Patient to return for H&P pending approval

## 2022-04-04 NOTE — PSYCH
Assessment/Plan:      Diagnoses and all orders for this visit:    Current moderate episode of major depressive disorder, unspecified whether recurrent ([facility]          Subjective:     Patient ID: Isaak Boss is a 34 y o  female presenting to this writer with ongoing depression  Pt reports the last two weeks have been difficult  Her son who has high functioning autism has been excluded from school due to disruptive behaviors, which means pt is going to have to work from home so her son can do online school  Pt reports this event was so stressful she almost quit her job on the spot  Pt states her work is very supportive, and they are allowing her to work from home throughout the week  Pt reports she followed up with this writer regarding improving communication with boyfriend  Pt reports rather than snapping at him, and approaching him to help from an angry and jaded place, she is trying to help him see where she is coming from  Pt reports she has noticed this strategy is much more successful  Pt reports she is going to increase her positive communication to her boyfriend and try and spend more quality time with him to ensure his emotional needs are met  As pt mentioned last time, she is so stressed with her son, and work and house keeping that she wants to hide under a rock in her spare time  Pt reports she is now communicating this to her boyfriend, which is improving the quality of the relationship  Pt is encouraged to read up on communication strategies and to always start the communication from a place of strength, find good times and avoid confrontations when tired  Pt also admits she is still leaning towards leaving the relationship as she feels her boyfriend is going to struggle to provide for her what she needs   Pt states she can't get away from the fact that she is in a mother toddler relationship with her boyfriend and she is not sure if her boyfriend is going to have the kind of maturity she needs in the person she is with  Pt states she is still deficient in her own self care  Pt reports she wants to work on this over the next few weeks  Pt reports she wants to go to visit her family in [facility] as this is always a relaxing and fun thing to do  Pt also reports she wants to increase her reliance on her own supports in this geographical area, although they are not as robust as her support system in [place] [facility]  Pt is encouraged to see if her friends or family could come up for one night a week to allow her and her boyfriend to start going on dates again  Pt is encouraged to think of creative ways to solve some of these problems  Pt to follow up with this writer in 4 weeks time  HPI    Review of Systems      Objective:     Physical Exam  This writer spent 45 minutes with pt from 3:15 to 4pm  Appearance: casually dressed good eye contact; speech: normal pitch and normal volume; behavior: normal, thought process: logical and goal directed, thought content: denies SI/HI, perceptions: no delusions of AH/VH, mood:depressed and sad, affect: congruent, orientated x4, insight/judgement improving

## 2022-04-06 ENCOUNTER — DOCUMENTATION (OUTPATIENT)
Dept: OTHER | Facility: HOSPITAL | Age: 32
End: 2022-04-06

## 2022-04-06 NOTE — QUICK NOTE
Discussion with surgical committee completed regarding desire for permanent sterilization  Requires updated pap smear with result prior to procedure  Recommended CBC, BMP, Hgb A1C, and urine culture  Otherwise approved for procedure  Message sent to surgical coordinator    Helena Pope MD  4/6/2022

## 2022-04-07 ENCOUNTER — OFFICE VISIT (OUTPATIENT)
Dept: FAMILY MEDICINE CLINIC | Facility: OTHER | Age: 32
End: 2022-04-07
Payer: COMMERCIAL

## 2022-04-07 VITALS
TEMPERATURE: 98 F | RESPIRATION RATE: 18 BRPM | OXYGEN SATURATION: 98 % | HEIGHT: 62 IN | BODY MASS INDEX: 23 KG/M2 | SYSTOLIC BLOOD PRESSURE: 102 MMHG | HEART RATE: 92 BPM | WEIGHT: 125 LBS | DIASTOLIC BLOOD PRESSURE: 78 MMHG

## 2022-04-07 DIAGNOSIS — F41.1 GENERALIZED ANXIETY DISORDER: Primary | ICD-10-CM

## 2022-04-07 DIAGNOSIS — J01.00 ACUTE NON-RECURRENT MAXILLARY SINUSITIS: ICD-10-CM

## 2022-04-07 PROCEDURE — 99213 OFFICE O/P EST LOW 20 MIN: CPT | Performed by: FAMILY MEDICINE

## 2022-04-07 RX ORDER — AMOXICILLIN AND CLAVULANATE POTASSIUM 875; 125 MG/1; MG/1
1 TABLET, FILM COATED ORAL EVERY 12 HOURS SCHEDULED
Qty: 14 TABLET | Refills: 0 | Status: SHIPPED | OUTPATIENT
Start: 2022-04-10 | End: 2022-04-17

## 2022-04-07 RX ORDER — FLUOXETINE 10 MG/1
10 CAPSULE ORAL 2 TIMES DAILY
Qty: 120 CAPSULE | Refills: 0 | Status: SHIPPED | OUTPATIENT
Start: 2022-04-07 | End: 2022-04-08 | Stop reason: DRUGHIGH

## 2022-04-07 RX ORDER — FLUOXETINE HYDROCHLORIDE 20 MG/1
20 CAPSULE ORAL DAILY
Qty: 60 CAPSULE | Refills: 0 | Status: SHIPPED | OUTPATIENT
Start: 2022-04-07 | End: 2022-04-07 | Stop reason: DRUGHIGH

## 2022-04-07 NOTE — PROGRESS NOTES
Assessment/Plan:    No problem-specific Assessment & Plan notes found for this encounter  Diagnoses and all orders for this visit:    Generalized anxiety disorder  -     Will d/c Lexapro at this time due to side effects of GI upset, headaches and dizziness  -     Patient noting she would like to trial Prozac as her twin sister has had good response on this med, did review that this SSRI is activating and may transiently worsening anxiety when starting med  -     Patient will start on 10mg and may titrate up to 20mg over the next 4-6 weeks as tolerated  -     FLUoxetine (PROzac) 10 mg capsule; Take 1 capsule (10 mg total) by mouth 2 (two) times a day    Acute non-recurrent maxillary sinusitis  -  Patient complains of sinus congestion, pain and pressure x  4 days, patient has agreed to wait until day 7 of symptom onset to see if she improves  If not, she may trial abx starting on 4/10 for presumed maxillary sinusitis   - amoxicillin-clavulanate (Augmentin) 875-125 mg per tablet; Take 1 tablet by mouth every 12 (twelve) hours for 7 days        Subjective:      Patient ID: Porfirio Oleary is a 34 y o  female  Patient presents for follow-up of anxiety as she was recently started on Lexapro x4 weeks ago  Unfortunately patient states she had multiple side effects including GI upset bouts of diarrhea, intermittent headaches, and dizziness which made taking the medication in tolerable and she only completed 2 weeks of pharmacotherapy at that time   Despite side effects patient does state she noticed improvement in anxiety and did not have a single panic attack while on Lexapro  Patient states that since stopping Lexapro she has experienced anxiety attacks specifically with continued palpitations x3 days  A  Hence, she would like to trial a different medication for anxiety to see if she is better able to tolerate    Patient reports that she has a twin sister she has been taken fluoxetine with notable improvement of her symptoms mood disorder  Discussed at length with patient that Prozac can be activating and may initially worsen anxiety symptoms but typically will stabilize with continued use of medication  URI   This is a new problem  The current episode started in the past 7 days (Patient reports she went on a fishing trip on Saturday  Then started developing symptoms of congestion and overnight chills on Monday  She states that her partner is also sick at home with similar symptoms  )  The problem has been unchanged (COVID-19 home test was negative )  There has been no fever  Associated symptoms include congestion, headaches and sinus pain  Pertinent negatives include no chest pain, diarrhea, ear pain, plugged ear sensation or vomiting  She has tried acetaminophen for the symptoms  The treatment provided no relief  The following portions of the patient's history were reviewed and updated as appropriate: allergies, current medications, past family history, past medical history, past social history, past surgical history and problem list     Review of Systems   HENT: Positive for congestion and sinus pain  Negative for ear pain  Respiratory: Negative for chest tightness and shortness of breath  Cardiovascular: Positive for palpitations  Negative for chest pain  Gastrointestinal: Negative for diarrhea and vomiting  Neurological: Positive for headaches  Objective:      /78   Pulse 92   Temp 98 °F (36 7 °C)   Resp 18   Ht 5' 2" (1 575 m)   Wt 56 7 kg (125 lb)   SpO2 98%   BMI 22 86 kg/m²          Physical Exam  Constitutional:       Appearance: She is well-developed  HENT:      Head: Normocephalic and atraumatic  Comments: Pain to palpation of the maxillary sinus noted on exam     Right Ear: Tympanic membrane, ear canal and external ear normal       Left Ear: Tympanic membrane, ear canal and external ear normal       Nose: Congestion present     Eyes:      Conjunctiva/sclera: Conjunctivae normal    Cardiovascular:      Rate and Rhythm: Normal rate and regular rhythm  Heart sounds: Normal heart sounds  Pulmonary:      Effort: Pulmonary effort is normal       Breath sounds: Normal breath sounds  Abdominal:      General: Bowel sounds are normal       Palpations: Abdomen is soft  Musculoskeletal:      Cervical back: Normal range of motion and neck supple  Skin:     General: Skin is warm and dry  Neurological:      Mental Status: She is alert and oriented to person, place, and time

## 2022-04-08 DIAGNOSIS — F41.1 GENERALIZED ANXIETY DISORDER: ICD-10-CM

## 2022-04-08 RX ORDER — FLUOXETINE 10 MG/1
20 CAPSULE ORAL DAILY
Qty: 30 CAPSULE | Refills: 0 | Status: CANCELLED | OUTPATIENT
Start: 2022-04-08 | End: 2022-06-07

## 2022-04-08 RX ORDER — FLUOXETINE HYDROCHLORIDE 20 MG/1
20 CAPSULE ORAL DAILY
Qty: 30 CAPSULE | Refills: 1 | Status: SHIPPED | OUTPATIENT
Start: 2022-04-08 | End: 2022-04-26

## 2022-04-08 NOTE — TELEPHONE ENCOUNTER
Dr Josep Gaspar Please refill script for 20mg daily  Pt insurance will  not cover twice a day at 10mg  Will only cover once daily  Please adjust dosage and sig  Thank you !     Eliza Bass MA

## 2022-04-08 NOTE — TELEPHONE ENCOUNTER
Order for 20mg daily was placed, please remind patient to let me know ASAP if she is not able to tolerate this dose and we can readjust  I dispense a 30 day supply with one refill in case she has any side effects      ThanksDr ALMAGUER

## 2022-04-11 ENCOUNTER — TELEPHONE (OUTPATIENT)
Dept: OBGYN CLINIC | Facility: CLINIC | Age: 32
End: 2022-04-11

## 2022-04-21 ENCOUNTER — PREP FOR PROCEDURE (OUTPATIENT)
Dept: OBGYN CLINIC | Facility: CLINIC | Age: 32
End: 2022-04-21

## 2022-04-21 DIAGNOSIS — Z30.2 STERILIZATION: Primary | ICD-10-CM

## 2022-04-26 ENCOUNTER — SOCIAL WORK (OUTPATIENT)
Dept: BEHAVIORAL/MENTAL HEALTH CLINIC | Facility: CLINIC | Age: 32
End: 2022-04-26
Payer: COMMERCIAL

## 2022-04-26 ENCOUNTER — TELEPHONE (OUTPATIENT)
Dept: FAMILY MEDICINE CLINIC | Facility: OTHER | Age: 32
End: 2022-04-26

## 2022-04-26 DIAGNOSIS — F33.1 MODERATE EPISODE OF RECURRENT MAJOR DEPRESSIVE DISORDER (HCC): Primary | ICD-10-CM

## 2022-04-26 PROCEDURE — 90834 PSYTX W PT 45 MINUTES: CPT | Performed by: SOCIAL WORKER

## 2022-04-26 NOTE — TELEPHONE ENCOUNTER
The patient would like to start the 10 mg of the fluoxetine  Can you please send a script to her pharmacy? Thank you!

## 2022-04-26 NOTE — PSYCH
Psychotherapy Provided: Individual Psychotherapy 45 minutes     Length of time in session: 45 minutes, follow up in 3 weeks time  Goals addressed in session: Goal 1 Pt reports her communication strategy continues to improve with her boyfriend  Pt is no longer holding things inside and exploding at offenses  Pt reports she is choosing to open up a lot more to her boyfriend, and this has been helpful to their relationship  Pt reports she has a tendency to put herself last, and this is a major cause of her depression, resentment and irritability  Pt reports she knows she needs to work on this  Pt states the depressed mood is still there and she still finds herself moving rapidly to an irritable place when she is frustrated by her boyfriends laziness of the behaviors of her eldest son (has ADHD and ODD)  Pt reports she has also been living in the past, and struggling to accept her new reality which is being in a mediocre relationship with two children who are demanding  Pt reports she tends to look back at her life in [place] at this fajardo time that she is trying to get back to, and she knows this is not realistic  Pt reports she has the benefit of having two great children, and she needs to appreciate this  Pt reports she feels there could be some compromise with her boyfriend in regards to where they live as her boyfriend has two children [place] and he picks them up and drops them off every other weekend  Pt feels relocating to [place] would make a lot of sense  Pt reports she and her boyfriend both have very marketable skills and they would have no problems finding work  This writer and pt discussed the stages of grief and how acceptance is hard to get to  Pt is encouraged to find that place of acceptance, but also continue to fight for the things she cares about  Pt is encouraged to continue to advocate for self and find her voice   Pt reports she does feel less anxious and less depressed  Pt also admits she would be doing even better if she was not working from home  Due to sons expulsion from school, she will be home based for the next few months, which makes her sad  Pain:      none    0    Current suicide risk : Low     Pt is future oriented and not suicidal      Behavioral Health Treatment Plan [facility]: Diagnosis and Treatment Plan explained to Jaky Morales relates understanding diagnosis and is agreeable to Treatment Plan   Yes

## 2022-04-26 NOTE — TELEPHONE ENCOUNTER
The patient came in for an appt with Carmenza Desir and stated that Dr Kike Dsouza hasn't gotten back to her on her [employer] message (see below)  The patient stopped taking the   Fluoxetine because of how it is making her stomach feel  Please advise    Thank you! Patient Message  Open     4/21/2022  [facility] Diane Caruso MD      Family Medicine       Conversation: Medication change  (Oldest Message First)    [facility]  to Rafat Jones MD          4/21/22 6:30 AM  Good Morning,  I have been taking the fluoxetine 20mg now for about a week now  My symptoms are not as bad as the lexapro, but I would like to try decreasing fluoxetine to 10mg  Im still having symptoms with my stomach  I also wanted to let you know yesterday i had another episode of heart palpitations  This time they were not as strong I guess because of the medication  Vincent Freeman    4/21/22 9:08 AM  Note  From: Juan Sullivan  To: Spencer Dupont MD  Sent: 4/21/2022  6:30 AM EDT  Subject: Medication change    Good Morning,  I have been taking the fluoxetine 20mg now for about a week now  My symptoms are not as bad as the lexapro, but I would like to try decreasing fluoxetine to 10mg  Im still having symptoms with my stomach  I also wanted to let you know yesterday i had another episode of heart palpitations   This time they were not as strong I guess because of the medication  Neena WADE    4/21/22 9:08 AM  Heather Reyes routed this conversation to Spencer Dupont MD

## 2022-04-26 NOTE — TELEPHONE ENCOUNTER
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

## 2022-04-29 ENCOUNTER — OFFICE VISIT (OUTPATIENT)
Dept: OBGYN CLINIC | Facility: CLINIC | Age: 32
End: 2022-04-29

## 2022-04-29 VITALS
HEIGHT: 62 IN | BODY MASS INDEX: 22.63 KG/M2 | SYSTOLIC BLOOD PRESSURE: 105 MMHG | WEIGHT: 123 LBS | DIASTOLIC BLOOD PRESSURE: 73 MMHG | HEART RATE: 92 BPM

## 2022-04-29 DIAGNOSIS — Z30.2 REQUEST FOR STERILIZATION: Primary | ICD-10-CM

## 2022-04-29 DIAGNOSIS — Z12.4 SCREENING FOR CERVICAL CANCER: ICD-10-CM

## 2022-04-29 PROCEDURE — 99213 OFFICE O/P EST LOW 20 MIN: CPT | Performed by: OBSTETRICS & GYNECOLOGY

## 2022-04-29 PROCEDURE — G0476 HPV COMBO ASSAY CA SCREEN: HCPCS | Performed by: OBSTETRICS & GYNECOLOGY

## 2022-04-29 PROCEDURE — G0124 SCREEN C/V THIN LAYER BY MD: HCPCS | Performed by: PATHOLOGY

## 2022-04-29 PROCEDURE — G0145 SCR C/V CYTO,THINLAYER,RESCR: HCPCS | Performed by: PATHOLOGY

## 2022-04-29 NOTE — H&P (VIEW-ONLY)
H&P Exam - Gynecology   Roverto Adrian 34 y o  female MRN: [id number]  Unit/Bed#:  Encounter: [id number]      History of Present Illness     HPI:  Roverto Adrian is a 34 y o  [id number] female who presents in anticipation of bilateral salpingectomy  The patient is certain that she desires no further children  She has a 1year old and a 8year old with special needs  She also has 2 stepchildren  The patient had an IUD in placed for two years with consistent abdominal pain  She had hoped that her partner would have a vasectomy, but he never completed it  The patient has significant anxiety and has been working with her PCP to get it better controlled  Review of Systems   Constitutional: Negative  HENT: Negative  Eyes: Negative  Respiratory: Negative  Cardiovascular: Negative  Gastrointestinal: Negative  Endocrine: Negative  Genitourinary: Negative  Negative for flank pain, vaginal bleeding and vaginal pain  Musculoskeletal: Negative  Allergic/Immunologic: Negative  Neurological: Negative  Hematological: Negative  Psychiatric/Behavioral: Negative          Historical Information   Past Medical History:   Diagnosis Date    Allergic     seasonal-dust, pollen, weeds, grass    Varicella    Anxiety attacks   Palpitations - EKG and Holter monitor was normal     Past Surgical History:   Procedure Laterality Date    RHINOPLASTY      VAGINAL DELIVERY       OB/GYN History: [id number]  Family History   Problem Relation Age of Onset    Diabetes Mother     Hypertension Mother     Seizures Mother     Depression Mother     Heart disease Father     Hypertension Father     Depression Brother     Depression Son     ADD / ADHD Son     No Known Problems Sister     Diabetes Maternal Grandmother     Diabetes Paternal Grandmother     Depression Sister     No Known Problems Son    Father: colon cancer   M GM: skin cancer     Social History   Social History     Substance and Sexual Activity   Alcohol Use Yes    Comment: socially     Social History     Substance and Sexual Activity   Drug Use No     Social History     Tobacco Use   Smoking Status Never Smoker   Smokeless Tobacco Never Used       Meds/Allergies   (Not in a hospital admission)    Allergies   Allergen Reactions    Dust Mite Extract     Pollen Extract     Tree Extract        Objective   /73 (BP Location: Right arm, Patient Position: Sitting, Cuff Size: Adult)   Pulse 92   Ht 5' 2" (1 575 m)   Wt 55 8 kg (123 lb)   LMP 03/27/2022 (Approximate)   BMI 22 50 kg/m²     Physical Exam  Constitutional:       General: She is not in acute distress  Appearance: Normal appearance  She is normal weight  She is not ill-appearing, toxic-appearing or diaphoretic  HENT:      Head: Normocephalic and atraumatic  Cardiovascular:      Rate and Rhythm: Normal rate and regular rhythm  Pulses: Normal pulses  Heart sounds: Normal heart sounds  No murmur heard  No friction rub  No gallop  Pulmonary:      Effort: Pulmonary effort is normal  No respiratory distress  Breath sounds: Normal breath sounds  Musculoskeletal:         General: No swelling  Right lower leg: No edema  Left lower leg: No edema  Skin:     General: Skin is warm and dry  Coloration: Skin is not jaundiced  Neurological:      Mental Status: She is alert  Psychiatric:         Mood and Affect: Mood normal          Behavior: Behavior normal          Thought Content: Thought content normal          Judgment: Judgment normal          Lab Results:   No visits with results within 1 Day(s) from this visit     Latest known visit with results is:   Office Visit on 03/18/2022   Component Date Value    N gonorrhoeae, DNA Probe 03/18/2022 Negative     Chlamydia trachomatis, D* 03/18/2022 Negative         Assessment/Plan     A/P: Lucas Lopes is a 34 y o  [id number] female who presents in anticipation of bilateral salpingectomy  We discussed that sterilization will be done via bilateral salpingectomy if possible to decrease ovarian cancer risk  She understands that  bilateral salpingectomy is intended for permanent sterilization and is not able to be reversed  Patient understands that this procedure is intended to provide permanent sterilization, and that the only way to become pregnant thereafter is through IVF, which is very cost intensive  Patient understands that there are alternative forms of birth control including abstinence, condoms or other barrier methods, OCPs, depo provera injection, ring, patch, Nexplanon, IUD - all of which are reversible and would allow for attempt of future pregnancy  She has declined these methods of birth control  Discussed risks of surgical procedures, including risk of bleeding, blood clots, infection, injury to soft tissue or surrounding organs,including bowel and bladder injury, permanent disability, or even death  Discussed procedure in detail  Discussed the various methods of surgical sterilization including tubal ligation and salpingectomy, and that route of procedure will be dependent on the physician covering the procedure  Pt demonstrated understanding and expressed a desire for salpingectomy as the preferred method  Pt had opportunity to ask questions and all questions were answered to patient's satisfaction      Code Status: Full Code     Kathryn Collier MD  4/29/2022  8:37 AM

## 2022-04-29 NOTE — PROGRESS NOTES
H&P Exam - Gynecology   Wilfrido Delgado 34 y o  female MRN: [id number]  Unit/Bed#:  Encounter: [id number]      History of Present Illness     HPI:  Wilfrido Delgado is a 34 y o   female who presents in anticipation of bilateral salpingectomy  The patient is certain that she desires no further children  She has a 1year old and a 8year old with special needs  She also has 2 stepchildren  The patient had an IUD in placed for two years with consistent abdominal pain  She had hoped that her partner would have a vasectomy, but he never completed it  The patient has significant anxiety and has been working with her PCP to get it better controlled  Review of Systems   Constitutional: Negative  HENT: Negative  Eyes: Negative  Respiratory: Negative  Cardiovascular: Negative  Gastrointestinal: Negative  Endocrine: Negative  Genitourinary: Negative  Negative for flank pain, vaginal bleeding and vaginal pain  Musculoskeletal: Negative  Allergic/Immunologic: Negative  Neurological: Negative  Hematological: Negative  Psychiatric/Behavioral: Negative          Historical Information   Past Medical History:   Diagnosis Date    Allergic     seasonal-dust, pollen, weeds, grass    Varicella    Anxiety attacks   Palpitations - EKG and Holter monitor was normal     Past Surgical History:   Procedure Laterality Date    RHINOPLASTY      VAGINAL DELIVERY       OB/GYN History: [id number]  Family History   Problem Relation Age of Onset    Diabetes Mother     Hypertension Mother     Seizures Mother     Depression Mother     Heart disease Father     Hypertension Father     Depression Brother     Depression Son     ADD / ADHD Son     No Known Problems Sister     Diabetes Maternal Grandmother     Diabetes Paternal Grandmother     Depression Sister     No Known Problems Son    Father: colon cancer   M GM: skin cancer     Social History   Social History     Substance and Sexual Activity   Alcohol Use Yes    Comment: socially     Social History     Substance and Sexual Activity   Drug Use No     Social History     Tobacco Use   Smoking Status Never Smoker   Smokeless Tobacco Never Used       Meds/Allergies   (Not in a hospital admission)    Allergies   Allergen Reactions    Dust Mite Extract     Pollen Extract     Tree Extract        Objective   /73 (BP Location: Right arm, Patient Position: Sitting, Cuff Size: Adult)   Pulse 92   Ht 5' 2" (1 575 m)   Wt 55 8 kg (123 lb)   LMP 03/27/2022 (Approximate)   BMI 22 50 kg/m²     Physical Exam  Constitutional:       General: She is not in acute distress  Appearance: Normal appearance  She is normal weight  She is not ill-appearing, toxic-appearing or diaphoretic  HENT:      Head: Normocephalic and atraumatic  Cardiovascular:      Rate and Rhythm: Normal rate and regular rhythm  Pulses: Normal pulses  Heart sounds: Normal heart sounds  No murmur heard  No friction rub  No gallop  Pulmonary:      Effort: Pulmonary effort is normal  No respiratory distress  Breath sounds: Normal breath sounds  Musculoskeletal:         General: No swelling  Right lower leg: No edema  Left lower leg: No edema  Skin:     General: Skin is warm and dry  Coloration: Skin is not jaundiced  Neurological:      Mental Status: She is alert  Psychiatric:         Mood and Affect: Mood normal          Behavior: Behavior normal          Thought Content: Thought content normal          Judgment: Judgment normal          Lab Results:   No visits with results within 1 Day(s) from this visit     Latest known visit with results is:   Office Visit on 03/18/2022   Component Date Value    N gonorrhoeae, DNA Probe 03/18/2022 Negative     Chlamydia trachomatis, D* 03/18/2022 Negative         Assessment/Plan     A/P: Tennille Barlow is a 34 y o  [id number] female who presents in anticipation of bilateral salpingectomy  We discussed that sterilization will be done via bilateral salpingectomy if possible to decrease ovarian cancer risk  She understands that  bilateral salpingectomy is intended for permanent sterilization and is not able to be reversed  Patient understands that this procedure is intended to provide permanent sterilization, and that the only way to become pregnant thereafter is through IVF, which is very cost intensive  Patient understands that there are alternative forms of birth control including abstinence, condoms or other barrier methods, OCPs, depo provera injection, ring, patch, Nexplanon, IUD - all of which are reversible and would allow for attempt of future pregnancy  She has declined these methods of birth control  Discussed risks of surgical procedures, including risk of bleeding, blood clots, infection, injury to soft tissue or surrounding organs,including bowel and bladder injury, permanent disability, or even death  Discussed procedure in detail  Discussed the various methods of surgical sterilization including tubal ligation and salpingectomy, and that route of procedure will be dependent on the physician covering the procedure  Pt demonstrated understanding and expressed a desire for salpingectomy as the preferred method  Pt had opportunity to ask questions and all questions were answered to patient's satisfaction      Code Status: Full Code     Peng Alvarenga MD  4/29/2022  8:37 AM

## 2022-05-02 ENCOUNTER — APPOINTMENT (OUTPATIENT)
Dept: LAB | Facility: HOSPITAL | Age: 32
End: 2022-05-02
Payer: COMMERCIAL

## 2022-05-02 DIAGNOSIS — Z30.2 REQUEST FOR STERILIZATION: ICD-10-CM

## 2022-05-02 LAB
ANION GAP SERPL CALCULATED.3IONS-SCNC: 7 MMOL/L (ref 4–13)
BACTERIA UR QL AUTO: ABNORMAL /HPF
BASOPHILS # BLD AUTO: 0.05 THOUSANDS/ΜL (ref 0–0.1)
BASOPHILS NFR BLD AUTO: 1 % (ref 0–1)
BILIRUB UR QL STRIP: NEGATIVE
BUN SERPL-MCNC: 13 MG/DL (ref 5–25)
CALCIUM SERPL-MCNC: 9 MG/DL (ref 8.4–10.2)
CHLORIDE SERPL-SCNC: 104 MMOL/L (ref 96–108)
CLARITY UR: CLEAR
CO2 SERPL-SCNC: 28 MMOL/L (ref 21–32)
COLOR UR: YELLOW
CREAT SERPL-MCNC: 0.61 MG/DL (ref 0.6–1.3)
EOSINOPHIL # BLD AUTO: 0.27 THOUSAND/ΜL (ref 0–0.61)
EOSINOPHIL NFR BLD AUTO: 4 % (ref 0–6)
ERYTHROCYTE [DISTWIDTH] IN BLOOD BY AUTOMATED COUNT: 13 % (ref 11.6–15.1)
EST. AVERAGE GLUCOSE BLD GHB EST-MCNC: 105 MG/DL
GFR SERPL CREATININE-BSD FRML MDRD: 119 ML/MIN/1.73SQ M
GLUCOSE P FAST SERPL-MCNC: 92 MG/DL (ref 65–99)
GLUCOSE UR STRIP-MCNC: NEGATIVE MG/DL
HBA1C MFR BLD: 5.3 %
HCT VFR BLD AUTO: 41.9 % (ref 34.8–46.1)
HGB BLD-MCNC: 14.2 G/DL (ref 11.5–15.4)
HGB UR QL STRIP.AUTO: ABNORMAL
IMM GRANULOCYTES # BLD AUTO: 0.04 THOUSAND/UL (ref 0–0.2)
IMM GRANULOCYTES NFR BLD AUTO: 1 % (ref 0–2)
KETONES UR STRIP-MCNC: NEGATIVE MG/DL
LEUKOCYTE ESTERASE UR QL STRIP: NEGATIVE
LYMPHOCYTES # BLD AUTO: 2.98 THOUSANDS/ΜL (ref 0.6–4.47)
LYMPHOCYTES NFR BLD AUTO: 42 % (ref 14–44)
MCH RBC QN AUTO: 29 PG (ref 26.8–34.3)
MCHC RBC AUTO-ENTMCNC: 33.9 G/DL (ref 31.4–37.4)
MCV RBC AUTO: 86 FL (ref 82–98)
MONOCYTES # BLD AUTO: 0.47 THOUSAND/ΜL (ref 0.17–1.22)
MONOCYTES NFR BLD AUTO: 7 % (ref 4–12)
MUCOUS THREADS UR QL AUTO: ABNORMAL
NEUTROPHILS # BLD AUTO: 3.31 THOUSANDS/ΜL (ref 1.85–7.62)
NEUTS SEG NFR BLD AUTO: 45 % (ref 43–75)
NITRITE UR QL STRIP: NEGATIVE
NON-SQ EPI CELLS URNS QL MICRO: ABNORMAL /HPF
NRBC BLD AUTO-RTO: 0 /100 WBCS
PH UR STRIP.AUTO: 6 [PH]
PLATELET # BLD AUTO: 384 THOUSANDS/UL (ref 149–390)
PMV BLD AUTO: 10.2 FL (ref 8.9–12.7)
POTASSIUM SERPL-SCNC: 3.7 MMOL/L (ref 3.5–5.3)
PROT UR STRIP-MCNC: NEGATIVE MG/DL
RBC # BLD AUTO: 4.9 MILLION/UL (ref 3.81–5.12)
RBC #/AREA URNS AUTO: ABNORMAL /HPF
SODIUM SERPL-SCNC: 139 MMOL/L (ref 135–147)
SP GR UR STRIP.AUTO: >=1.03 (ref 1–1.03)
UROBILINOGEN UR QL STRIP.AUTO: 0.2 E.U./DL
WBC # BLD AUTO: 7.12 THOUSAND/UL (ref 4.31–10.16)
WBC #/AREA URNS AUTO: ABNORMAL /HPF

## 2022-05-02 PROCEDURE — 85025 COMPLETE CBC W/AUTO DIFF WBC: CPT

## 2022-05-02 PROCEDURE — 36415 COLL VENOUS BLD VENIPUNCTURE: CPT

## 2022-05-02 PROCEDURE — 81001 URINALYSIS AUTO W/SCOPE: CPT

## 2022-05-02 PROCEDURE — 81003 URINALYSIS AUTO W/O SCOPE: CPT

## 2022-05-02 PROCEDURE — 83036 HEMOGLOBIN GLYCOSYLATED A1C: CPT

## 2022-05-02 PROCEDURE — 80048 BASIC METABOLIC PNL TOTAL CA: CPT

## 2022-05-02 NOTE — PRE-PROCEDURE INSTRUCTIONS
Pre-Surgery Instructions:   Medication Instructions    Junel FE 1/20 1-20 MG-MCG per tablet Take day of surgery       Pt verbalizes understanding of the following:    - Bathing instructions, will get chg, neck down, no genitals  - No lotions, powders, sprays, deodorant, jewelry, body piercings or make-up  - Wear glasses not contact lens    - NPO after MN  - Avoid OTC non-directed Vit/ Suppl/ Herbals 7 days prior to surgery to ensure no drug interactions with perioperative surgical/ anesthetic meds  - Avoid NSAIDs 3 days prior  - Avoid ASA containing products 5 days prior    - Bring list of meds with last dose noted  - [facility] cards & photo id

## 2022-05-06 ENCOUNTER — ANESTHESIA (OUTPATIENT)
Dept: PERIOP | Facility: HOSPITAL | Age: 32
End: 2022-05-06
Payer: COMMERCIAL

## 2022-05-06 ENCOUNTER — ANESTHESIA EVENT (OUTPATIENT)
Dept: PERIOP | Facility: HOSPITAL | Age: 32
End: 2022-05-06
Payer: COMMERCIAL

## 2022-05-06 ENCOUNTER — HOSPITAL ENCOUNTER (OUTPATIENT)
Facility: HOSPITAL | Age: 32
Setting detail: OUTPATIENT SURGERY
Discharge: HOME | End: 2022-05-06
Attending: OBSTETRICS & GYNECOLOGY | Admitting: OBSTETRICS & GYNECOLOGY
Payer: COMMERCIAL

## 2022-05-06 VITALS
OXYGEN SATURATION: 97 % | TEMPERATURE: 98.7 F | HEIGHT: 62 IN | DIASTOLIC BLOOD PRESSURE: 64 MMHG | HEART RATE: 90 BPM | WEIGHT: 123 LBS | RESPIRATION RATE: 16 BRPM | SYSTOLIC BLOOD PRESSURE: 105 MMHG | BODY MASS INDEX: 22.63 KG/M2

## 2022-05-06 DIAGNOSIS — G89.18 POST-OP PAIN: ICD-10-CM

## 2022-05-06 DIAGNOSIS — Z30.2 STERILIZATION: ICD-10-CM

## 2022-05-06 DIAGNOSIS — Z90.79 STATUS POST BILATERAL SALPINGECTOMY: Primary | ICD-10-CM

## 2022-05-06 LAB
EXT PREGNANCY TEST URINE: NEGATIVE
EXT. CONTROL: NORMAL
LAB AP GYN PRIMARY INTERPRETATION: ABNORMAL
Lab: ABNORMAL
PATH INTERP SPEC-IMP: ABNORMAL

## 2022-05-06 PROCEDURE — 81025 URINE PREGNANCY TEST: CPT | Performed by: OBSTETRICS & GYNECOLOGY

## 2022-05-06 PROCEDURE — 88302 TISSUE EXAM BY PATHOLOGIST: CPT | Performed by: PATHOLOGY

## 2022-05-06 PROCEDURE — 58661 LAPAROSCOPY REMOVE ADNEXA: CPT | Performed by: OBSTETRICS & GYNECOLOGY

## 2022-05-06 RX ORDER — FENTANYL CITRATE 50 UG/ML
INJECTION, SOLUTION INTRAMUSCULAR; INTRAVENOUS AS NEEDED
Status: DISCONTINUED | OUTPATIENT
Start: 2022-05-06 | End: 2022-05-06

## 2022-05-06 RX ORDER — MEPERIDINE HYDROCHLORIDE 25 MG/ML
12.5 INJECTION INTRAMUSCULAR; INTRAVENOUS; SUBCUTANEOUS
Status: DISCONTINUED | OUTPATIENT
Start: 2022-05-06 | End: 2022-05-06 | Stop reason: HOSPADM

## 2022-05-06 RX ORDER — LIDOCAINE HYDROCHLORIDE 10 MG/ML
INJECTION, SOLUTION EPIDURAL; INFILTRATION; INTRACAUDAL; PERINEURAL AS NEEDED
Status: DISCONTINUED | OUTPATIENT
Start: 2022-05-06 | End: 2022-05-06

## 2022-05-06 RX ORDER — DEXAMETHASONE SODIUM PHOSPHATE 10 MG/ML
INJECTION, SOLUTION INTRAMUSCULAR; INTRAVENOUS AS NEEDED
Status: DISCONTINUED | OUTPATIENT
Start: 2022-05-06 | End: 2022-05-06

## 2022-05-06 RX ORDER — HYDROMORPHONE HCL/PF 1 MG/ML
0.5 SYRINGE (ML) INJECTION
Status: DISCONTINUED | OUTPATIENT
Start: 2022-05-06 | End: 2022-05-06 | Stop reason: HOSPADM

## 2022-05-06 RX ORDER — GLYCOPYRROLATE 0.2 MG/ML
INJECTION INTRAMUSCULAR; INTRAVENOUS AS NEEDED
Status: DISCONTINUED | OUTPATIENT
Start: 2022-05-06 | End: 2022-05-06

## 2022-05-06 RX ORDER — LIDOCAINE HYDROCHLORIDE 10 MG/ML
0.5 INJECTION, SOLUTION EPIDURAL; INFILTRATION; INTRACAUDAL; PERINEURAL ONCE AS NEEDED
Status: COMPLETED | OUTPATIENT
Start: 2022-05-06 | End: 2022-05-06

## 2022-05-06 RX ORDER — ALBUTEROL SULFATE 2.5 MG/3ML
2.5 SOLUTION RESPIRATORY (INHALATION) ONCE AS NEEDED
Status: DISCONTINUED | OUTPATIENT
Start: 2022-05-06 | End: 2022-05-06 | Stop reason: HOSPADM

## 2022-05-06 RX ORDER — BUPIVACAINE HYDROCHLORIDE 2.5 MG/ML
INJECTION, SOLUTION EPIDURAL; INFILTRATION; INTRACAUDAL AS NEEDED
Status: DISCONTINUED | OUTPATIENT
Start: 2022-05-06 | End: 2022-05-06 | Stop reason: HOSPADM

## 2022-05-06 RX ORDER — PROPOFOL 10 MG/ML
INJECTION, EMULSION INTRAVENOUS AS NEEDED
Status: DISCONTINUED | OUTPATIENT
Start: 2022-05-06 | End: 2022-05-06

## 2022-05-06 RX ORDER — SODIUM CHLORIDE, SODIUM LACTATE, POTASSIUM CHLORIDE, CALCIUM CHLORIDE 600; 310; 30; 20 MG/100ML; MG/100ML; MG/100ML; MG/100ML
INJECTION, SOLUTION INTRAVENOUS CONTINUOUS PRN
Status: DISCONTINUED | OUTPATIENT
Start: 2022-05-06 | End: 2022-05-06

## 2022-05-06 RX ORDER — MIDAZOLAM HYDROCHLORIDE 2 MG/2ML
INJECTION, SOLUTION INTRAMUSCULAR; INTRAVENOUS AS NEEDED
Status: DISCONTINUED | OUTPATIENT
Start: 2022-05-06 | End: 2022-05-06

## 2022-05-06 RX ORDER — FENTANYL CITRATE/PF 50 MCG/ML
25 SYRINGE (ML) INJECTION
Status: DISCONTINUED | OUTPATIENT
Start: 2022-05-06 | End: 2022-05-06 | Stop reason: HOSPADM

## 2022-05-06 RX ORDER — ONDANSETRON 2 MG/ML
INJECTION INTRAMUSCULAR; INTRAVENOUS AS NEEDED
Status: DISCONTINUED | OUTPATIENT
Start: 2022-05-06 | End: 2022-05-06

## 2022-05-06 RX ORDER — NEOSTIGMINE METHYLSULFATE 1 MG/ML
INJECTION INTRAVENOUS AS NEEDED
Status: DISCONTINUED | OUTPATIENT
Start: 2022-05-06 | End: 2022-05-06

## 2022-05-06 RX ORDER — ONDANSETRON 2 MG/ML
4 INJECTION INTRAMUSCULAR; INTRAVENOUS ONCE AS NEEDED
Status: DISCONTINUED | OUTPATIENT
Start: 2022-05-06 | End: 2022-05-06 | Stop reason: HOSPADM

## 2022-05-06 RX ORDER — ACETAMINOPHEN 325 MG/1
975 TABLET ORAL EVERY 6 HOURS PRN
Status: DISCONTINUED | OUTPATIENT
Start: 2022-05-06 | End: 2022-05-06 | Stop reason: HOSPADM

## 2022-05-06 RX ORDER — SODIUM CHLORIDE, SODIUM LACTATE, POTASSIUM CHLORIDE, CALCIUM CHLORIDE 600; 310; 30; 20 MG/100ML; MG/100ML; MG/100ML; MG/100ML
125 INJECTION, SOLUTION INTRAVENOUS CONTINUOUS
Status: DISCONTINUED | OUTPATIENT
Start: 2022-05-06 | End: 2022-05-06 | Stop reason: HOSPADM

## 2022-05-06 RX ORDER — PROMETHAZINE HYDROCHLORIDE 25 MG/ML
12.5 INJECTION, SOLUTION INTRAMUSCULAR; INTRAVENOUS ONCE AS NEEDED
Status: DISCONTINUED | OUTPATIENT
Start: 2022-05-06 | End: 2022-05-06 | Stop reason: HOSPADM

## 2022-05-06 RX ORDER — ROCURONIUM BROMIDE 10 MG/ML
INJECTION, SOLUTION INTRAVENOUS AS NEEDED
Status: DISCONTINUED | OUTPATIENT
Start: 2022-05-06 | End: 2022-05-06

## 2022-05-06 RX ORDER — OXYCODONE HYDROCHLORIDE AND ACETAMINOPHEN 5; 325 MG/1; MG/1
1-2 TABLET ORAL EVERY 6 HOURS PRN
Qty: 8 TABLET | Refills: 0 | Status: SHIPPED | OUTPATIENT
Start: 2022-05-06 | End: 2022-05-16

## 2022-05-06 RX ORDER — KETOROLAC TROMETHAMINE 30 MG/ML
INJECTION, SOLUTION INTRAMUSCULAR; INTRAVENOUS AS NEEDED
Status: DISCONTINUED | OUTPATIENT
Start: 2022-05-06 | End: 2022-05-06

## 2022-05-06 RX ORDER — IBUPROFEN 600 MG/1
600 TABLET ORAL EVERY 6 HOURS PRN
Status: DISCONTINUED | OUTPATIENT
Start: 2022-05-06 | End: 2022-05-06 | Stop reason: HOSPADM

## 2022-05-06 RX ADMIN — MIDAZOLAM 2 MG: 1 INJECTION INTRAMUSCULAR; INTRAVENOUS at 12:39

## 2022-05-06 RX ADMIN — IBUPROFEN 600 MG: 600 TABLET, FILM COATED ORAL at 15:48

## 2022-05-06 RX ADMIN — FENTANYL CITRATE 50 MCG: 50 INJECTION INTRAMUSCULAR; INTRAVENOUS at 12:46

## 2022-05-06 RX ADMIN — FENTANYL CITRATE 25 MCG: 50 INJECTION INTRAMUSCULAR; INTRAVENOUS at 13:42

## 2022-05-06 RX ADMIN — SODIUM CHLORIDE, SODIUM LACTATE, POTASSIUM CHLORIDE, AND CALCIUM CHLORIDE: .6; .31; .03; .02 INJECTION, SOLUTION INTRAVENOUS at 12:39

## 2022-05-06 RX ADMIN — ONDANSETRON 4 MG: 2 INJECTION INTRAMUSCULAR; INTRAVENOUS at 13:32

## 2022-05-06 RX ADMIN — PROPOFOL 200 MG: 10 INJECTION, EMULSION INTRAVENOUS at 12:46

## 2022-05-06 RX ADMIN — SODIUM CHLORIDE, SODIUM LACTATE, POTASSIUM CHLORIDE, AND CALCIUM CHLORIDE 125 ML/HR: .6; .31; .03; .02 INJECTION, SOLUTION INTRAVENOUS at 11:45

## 2022-05-06 RX ADMIN — LIDOCAINE HYDROCHLORIDE 0.2 ML: 10 INJECTION, SOLUTION EPIDURAL; INFILTRATION; INTRACAUDAL; PERINEURAL at 11:45

## 2022-05-06 RX ADMIN — KETOROLAC TROMETHAMINE 30 MG: 30 INJECTION, SOLUTION INTRAMUSCULAR at 13:33

## 2022-05-06 RX ADMIN — LIDOCAINE HYDROCHLORIDE 50 MG: 10 INJECTION, SOLUTION EPIDURAL; INFILTRATION; INTRACAUDAL; PERINEURAL at 12:46

## 2022-05-06 RX ADMIN — GLYCOPYRROLATE 0.4 MG: 0.2 INJECTION, SOLUTION INTRAMUSCULAR; INTRAVENOUS at 13:41

## 2022-05-06 RX ADMIN — NEOSTIGMINE METHYLSULFATE 3 MG: 1 INJECTION INTRAVENOUS at 13:41

## 2022-05-06 RX ADMIN — FENTANYL CITRATE 25 MCG: 50 INJECTION INTRAMUSCULAR; INTRAVENOUS at 13:07

## 2022-05-06 RX ADMIN — ROCURONIUM BROMIDE 40 MG: 50 INJECTION INTRAVENOUS at 12:46

## 2022-05-06 RX ADMIN — DEXAMETHASONE SODIUM PHOSPHATE 10 MG: 10 INJECTION, SOLUTION INTRAMUSCULAR; INTRAVENOUS at 13:06

## 2022-05-06 NOTE — ANESTHESIA POSTPROCEDURE EVALUATION
Post-Op Assessment Note    CV Status:  Stable    Pain management: adequate     Mental Status:  Alert and awake   Hydration Status:  Euvolemic   PONV Controlled:  Controlled   Airway Patency:  Patent      Post Op Vitals Reviewed: Yes      Staff: CRNA         No complications documented      BP   123/76   Temp     Pulse  70   Resp   12   SpO2   100

## 2022-05-06 NOTE — OP NOTE
OPERATIVE REPORT  PATIENT NAME: Claudette Blackburn    :  1990  MRN: [id number]  Pt Location:  OR ROOM 07    SURGERY DATE: 2022    Surgeon(s) and Role:     * Robbi Saint, MD - Primary     * Mary Duron MD - Assisting    Preop Diagnosis:  Multiparity; desire for surgical sterilization [Z30 2]    Post-Op Diagnosis Codes:  Multiparity; desire for surgical sterilization [Z30 2]    Procedure(s) (LRB):  SALPINGECTOMY, LAPAROSCOPIC (Bilateral)    Specimen(s):  ID Type Source Tests Collected by Time Destination   1 :  Tissue Fallopian Tubes, Bilateral TISSUE EXAM Robbi Saint, MD 2022 1307        Estimated Blood Loss: 5 cc  Anesthesia Type: General endotracheal anesthesia    Operative Indications:  Sterilization [Z30 2]    Operative Findings:  Normal appearing external female genitalia  Normal bilateral ovaries and fallopian tubes    Procedure:  Brief History    All risks, benefits, and alternatives to the procedure were discussed with the patient and she had the opportunity to ask questions  The risk of regret of procedure was also addressed with the patient and options for LARC was discussed  Patient expressed desire to continue with tubal sterilization  Informed consent was obtained  Description of Procedure    Patient was taken to the operating room  General endotracheal anesthesia (GET) was administered and the patient was positioned on the OR table in the dorsal lithotomy position  All pressure points were padded and a dank hugger was placed to maintain control of core body temperature  A bimanual exam was performed and the uterus was noted to be anteverted, normal in size and consistency with no palpable adnexal masses or fullness  The patient was prepped and draped in the usual sterile fashion with chloroprep on the abdomen and betadine prep on the vagina and perineum  Operative Technique    A time out was performed to confirm correct patient and correct procedure       A 5mm incision was made at the inferior edge of the umbilicus for introduction of a 5 mm trocar  Trocar was introduced under direct visualization  Pneumoperitoneum was then established to a maximum of 15mmHg  The entire abdomen and pelvis was inspected and there was no evidence of injury to bowel, bladder, vasculature, or other structures  Attention was then turned to the pelvis  Patient was placed in Trendelenburg and the uterus was elevated to visualize the fallopian tubes  There was noted to be grossly normal tubes and ovaries bilaterally  Two additional port sites were selected in the left and right lower abdomen approximately 2cm superior and medial to the iliac crests  A 5 mm incision was made for introduction of a 5 mm trocar under direct visualization at each site  A blunt probe was inserted through this port and used to visualize the fimbriated ends of the tubes  The left fallopian tube was grasped at its fimbriated end with a blunt grasper and elevated to visualize the mesosalpinx  The Enseal device was used to ligate along the mesosalpinx, working proximally and taking care to avoid ovarian vasculature  Approximately 2cm from the cornua, the Enseal was used to amputate fallopian tube  This was then withdrawn from the abdominal cavity and sent for pathology  Attention was then turned to the contralateral tube, which was amputated in similar fashion  Good hemostasis was confirmed following salpingectomy  Following salpingectomy, pneumoperitoneum was allowed to escape  Adequate hemostasis was visualized  The inferior trocars were removed under direct visualization  The laparoscope was withdrawn from the abdomen, followed by its trocar sleeve at the umbilicus  Skin incisions were closed with 4-0 Monocryl and Exofin  At the conclusion of the procedure, all needle, sponge, and instrument counts were noted to be correct x2   Patient tolerated the procedure well and was transferred to PACU in stable condition prior to discharge with follow up in 1-2 weeks  Dr Shaq Baker was present and participated in all key portions of the case      SIGNATURETotomás Pleitez MD  DATE: May 6, 2022  TIME: 2:04 PM

## 2022-05-06 NOTE — DISCHARGE INSTRUCTIONS
Salpingectomy   WHAT YOU NEED TO KNOW:   A salpingectomy is surgery to remove one or both of your fallopian tubes  The fallopian tubes carry eggs from the ovaries to the uterus  They are part of a woman's reproductive system  A salpingectomy may be done to treat an ectopic pregnancy, cancer, endometriosis, or an infection  It may also be done to prevent pregnancy or some types of cancer  DISCHARGE INSTRUCTIONS:   Call your local emergency number (911 in the [address]) for any of the following:   · You feel lightheaded, short of breath, and have chest pain  · You cough up blood  · You have trouble breathing  Seek care immediately if:   · Your arm or leg feels warm, tender, and painful  It may look swollen and red  · Blood soaks through your bandage  · Your stitches come apart  · You soak through 1 sanitary pad in 1 hour  · You have trouble urinating or cannot urinate at all  Call your doctor or surgeon if:   · You have a fever or chills  · Your wound is red, swollen, or draining pus  · You have pus or a foul-smelling odor coming from your vagina  · Your pain does not get better after you take your medicine  · You have nausea or are vomiting  · Your skin is itchy, swollen, or you have a rash  · You have questions or concerns about your condition or care  Medicines: You may need any of the following:  · NSAIDs , such as ibuprofen, help decrease swelling, pain, and fever  NSAIDs can cause stomach bleeding or kidney problems in certain people  If you take blood thinner medicine, always ask your healthcare provider if NSAIDs are safe for you  Always read the medicine label and follow directions  · Prescription pain medicine  may be given  Ask your healthcare provider how to take this medicine safely  Some prescription pain medicines contain acetaminophen  Do not take other medicines that contain acetaminophen without talking to your healthcare provider   Too much acetaminophen may cause liver damage  Prescription pain medicine may cause constipation  Ask your healthcare provider how to prevent or treat constipation  · Take your medicine as directed  Contact your healthcare provider if you think your medicine is not helping or if you have side effects  Tell him or her if you are allergic to any medicine  Keep a list of the medicines, vitamins, and herbs you take  Include the amounts, and when and why you take them  Bring the list or the pill bottles to follow-up visits  Carry your medicine list with you in case of an emergency  Care for your wound as directed:  Ask your healthcare provider when your wound can get wet  Do not take a bath until your healthcare provider says it is okay  Take a shower only  Carefully wash around the wound with soap and water  Let the soap and water gently run over your incision  Do not  scrub your incision  Dry the area and put on new, clean bandages as directed  Change your bandages when they get wet or dirty  If you have strips of medical tape, let them fall off on their own  Activity:  Ask your healthcare provider when you can return to your normal activities  Do not douche, use tampons, or have sex until your healthcare provider says it is okay  These activities may cause infection  Do not exercise or lift anything heavy until your healthcare provider says it is okay  This may put too much stress on your incision  Follow up with your doctor or surgeon as directed:  Write down your questions so you remember to ask them during your visits  © Copyright [employer] 2022 Information is for End User's use only and may not be sold, redistributed or otherwise used for commercial purposes  All illustrations and images included in CareNotes® are the copyrighted property of [employer]  or Aundrea Mendez  The above information is an  only  It is not intended as medical advice for individual conditions or treatments  Talk to your doctor, nurse or pharmacist before following any medical regimen to see if it is safe and effective for you

## 2022-05-06 NOTE — INTERVAL H&P NOTE
H&P reviewed  After examining the patient I find no changes in the patients condition since the H&P had been written  Post op instr given  RTO 2 weeks  Percocet then Advil prn pain    PE:  Abdomen wnl  Ext wnl  Head NC, AT    Vitals:    05/06/22 1104   BP: 114/81   Pulse: 89   Resp: 18   Temp: 98 5 °F (36 9 °C)   SpO2: 96%

## 2022-05-10 ENCOUNTER — TELEPHONE (OUTPATIENT)
Dept: OBGYN CLINIC | Facility: CLINIC | Age: 32
End: 2022-05-10

## 2022-05-10 NOTE — TELEPHONE ENCOUNTER
Attempted to call, and phone number was disconnected   Will attempt to call patient tomorrow regarding making an appointment for a colposcopy

## 2022-05-11 NOTE — PROGRESS NOTES
OB/GYN VISIT  Fiordaliza Silvestre  5/13/2022  9:56 AM    Subjective:     Fiordaliza Silvestre is a 34 y o  [id number] female who presents for 1 week post op evaluation  Patient had a bilateral salpingectomy completed on 5/6 for sterilization  She denies pain, fever, chills, chest pain or shortness of breath  Patient states "I have been feeling very full from eating only a little bit of food  I'm full even drinking water"  She reports having a bowel movement on POD 4 and 5 but has not had a BM since that time  She is passing flatus, reports appropriate bladder function, denies nausea and vomiting  She reports constipation prior to surgery and takes Metamucil daily  She states that her period "should have come the week of surgery" but did not come  She is unsure if the bloating is due to her period coming or the surgery  Objective:    Vitals: Blood pressure 118/85, pulse 98, resp  rate 18, height 5' 2" (1 575 m), weight 55 3 kg (122 lb), last menstrual period 04/18/2022, not currently breastfeeding  Body mass index is 22 31 kg/m²  Past Medical History:   Diagnosis Date    Allergic     seasonal-dust, pollen, weeds, grass    Anxiety     History of palpitations     with anxiety    Varicella     Wears contact lenses     Wears glasses      Past Surgical History:   Procedure Laterality Date    [place] LAP,RMV  ADNEXAL STRUCTURE Bilateral 5/6/2022    Procedure: SALPINGECTOMY, LAPAROSCOPIC;  Surgeon: Chriss Taveras MD;  [facility];  Service: Gynecology    RHINOPLASTY      VAGINAL DELIVERY         Physical Exam  Constitutional:       Appearance: Normal appearance  Pulmonary:      Effort: Pulmonary effort is normal    Abdominal:      Palpations: Abdomen is soft  Tenderness: There is no abdominal tenderness  There is no guarding or rebound  Comments: Mild distention   Incision c/d/i healing well no evidence of infection   No evidence of acute abdomen    Neurological:      Mental Status: She is alert  Assessment/Plan:    Margarito Gonzales is a 34 y o  [id number] female 1 week post op from bilateral salpingectomy  Problem List Items Addressed This Visit        Other    Status post bilateral salpingectomy - Primary     Doing well overall   No longer needing narcotic medications  Has appropriate bladder function  Reports constipation, but denies nausea, vomiting, fever, chills, abdominal pain  No guarding or rebound on exam  VSS  Discussed with patient that she can use metamucil or fiber supplementation daily  If needed she can take a laxative if she wants to have a bowel movement sooner  Will follow up in 1 week  She will call if she has acute onset abdominal pain, nausea, vomiting, no longer passing flatus                    Sandra Romeo MD  5/13/2022  9:56 AM

## 2022-05-13 ENCOUNTER — OFFICE VISIT (OUTPATIENT)
Dept: OBGYN CLINIC | Facility: CLINIC | Age: 32
End: 2022-05-13

## 2022-05-13 VITALS
DIASTOLIC BLOOD PRESSURE: 85 MMHG | WEIGHT: 122 LBS | BODY MASS INDEX: 22.45 KG/M2 | SYSTOLIC BLOOD PRESSURE: 118 MMHG | HEART RATE: 98 BPM | HEIGHT: 62 IN | RESPIRATION RATE: 18 BRPM

## 2022-05-13 DIAGNOSIS — Z90.79 STATUS POST BILATERAL SALPINGECTOMY: Primary | ICD-10-CM

## 2022-05-13 PROCEDURE — 99024 POSTOP FOLLOW-UP VISIT: CPT | Performed by: OBSTETRICS & GYNECOLOGY

## 2022-05-13 NOTE — ASSESSMENT & PLAN NOTE
Doing well overall   No longer needing narcotic medications  Has appropriate bladder function  Reports constipation, but denies nausea, vomiting, fever, chills, abdominal pain  No guarding or rebound on exam  VSS  Discussed with patient that she can use metamucil or fiber supplementation daily  If needed she can take a laxative if she wants to have a bowel movement sooner  Will follow up in 1 week  She will call if she has acute onset abdominal pain, nausea, vomiting, no longer passing flatus

## 2022-05-17 ENCOUNTER — TELEPHONE (OUTPATIENT)
Dept: OBGYN CLINIC | Facility: CLINIC | Age: 32
End: 2022-05-17

## 2022-05-17 ENCOUNTER — SOCIAL WORK (OUTPATIENT)
Dept: BEHAVIORAL/MENTAL HEALTH CLINIC | Facility: CLINIC | Age: 32
End: 2022-05-17
Payer: COMMERCIAL

## 2022-05-17 DIAGNOSIS — F32.1 MODERATE MAJOR DEPRESSION (HCC): Primary | ICD-10-CM

## 2022-05-17 PROCEDURE — 90834 PSYTX W PT 45 MINUTES: CPT | Performed by: SOCIAL WORKER

## 2022-05-17 NOTE — PSYCH
Psychotherapy Provided: Individual Psychotherapy 45 minutes     Length of time in session: 45 minutes, follow up in 4 weeks time  Goals addressed in session: Goal 1 Pt reports for follow up for moderate depression, recurrent and active  Pt reports she continues to find herself irritated and frustrated  Pt reports there remains a huge gap between her expectations and reality  Pt reports she has a lot of withdraws from her account but not many deposits  Pt states she is constantly running on empty, and is looking for ways to reduce stimulation, or get away from people  Pt states between her job, and her special needs son, boyfriend working night shift, she is the one who is holding everything together  Pt reports she continues to get better at articulating her concerns to her boyfriend  However, while she is able to vent her frustrations, her boyfriend is not changing his behavior  Pt reports she can't change him or control him  This writer asks pt what she can do? Pt reports she has to focus on what she can control  Pt reports she needs to find ways to increase the amount of deposits in her life, or she needs to find a way to reduce the amount of withdraws she is experiencing  This writer and pt problem solved this area  This writer wonders whether she can have an easy to make meal for her  to make twice a week  Could pt find a way to get to the gym once a week? Could pt have a date night with her girlfriends every two weeks, and let her boyfriend know that he will have a movie night with the kids for that time duration  Pt also opened up some more about some general unhappiness with her boyfriend  Pt reports he is no longer taking care of himself  Pt reports he has gained weight, and pt feels she is not as attracted to him as she used to be  Pt reports his weight increase combined with his lack of help and support around the home is a real turn off for pt  Pt finds it hard to articulate this  Pt and writer also discussed co-depenency tendencies which pt agrees she has  Pt reports she has always led her life trying to meet other needs prior to her own  Pt reports currently there are so many needs she is trying to address that she is feeling burnt out  Pt is encouraged to try and identify ways she can prioritize herself a little more and get some reserves back into her account  Pt and this writer discussed communication styles, improving boundaries, staying calm when communicating to get the point across in a clear and concise way  Pt and this writer also discussed ways to bolster self care  Pt to follow up with this writer in 4 weeks time  Pain:      none    0    Current suicide risk : Low     Pt is not suicidal and is future oriented  Behavioral Health Treatment Plan [facility]: Diagnosis and Treatment Plan explained to Maggy Campos relates understanding diagnosis and is agreeable to Treatment Plan   Yes

## 2022-05-17 NOTE — TELEPHONE ENCOUNTER
Spoke with pt today , notified of Pap results and recommendation for colposcopy  Pt is scheduled for 5/23 w / Dr Elana Voss

## 2022-05-23 ENCOUNTER — PROCEDURE VISIT (OUTPATIENT)
Dept: OBGYN CLINIC | Facility: CLINIC | Age: 32
End: 2022-05-23

## 2022-05-23 DIAGNOSIS — Z90.79 STATUS POST BILATERAL SALPINGECTOMY: Primary | ICD-10-CM

## 2022-05-23 DIAGNOSIS — R87.610 ATYPICAL SQUAMOUS CELLS OF UNDETERMINED SIGNIFICANCE ON CYTOLOGIC SMEAR OF CERVIX (ASC-US): ICD-10-CM

## 2022-05-23 PROCEDURE — 57500 BIOPSY OF CERVIX: CPT | Performed by: OBSTETRICS & GYNECOLOGY

## 2022-05-23 NOTE — PROGRESS NOTES
Subjective:     Dyan Tello is a 34 y o  [id number] female who presents for colposcopy today  We reviewed that given her prior testing, she can proceed with Pap test next year rather than colposcopy today  We also reviewed her pathology results from her tubal ligation performed on 5/6/22  Patient Active Problem List   Diagnosis    Rosacea    IUD (intrauterine device) in place    Vaginal leukorrhea    Vaginal discharge    Bacterial vaginosis    Request for sterilization    Atypical squamous cells of undetermined significance on cytologic smear of cervix (ASC-US)    Status post bilateral salpingectomy     Past Medical History:   Diagnosis Date    Allergic     seasonal-dust, pollen, weeds, grass    Anxiety     History of palpitations     with anxiety    Varicella     Wears contact lenses     Wears glasses        Objective:    Vitals: not currently breastfeeding  There is no height or weight on file to calculate BMI  Physical Exam  Vitals reviewed  Constitutional:       General: She is not in acute distress  Appearance: Normal appearance  She is well-developed  She is not ill-appearing, toxic-appearing or diaphoretic  Cardiovascular:      Rate and Rhythm: Normal rate  Pulmonary:      Effort: Pulmonary effort is normal  No respiratory distress  Neurological:      Mental Status: She is alert and oriented to person, place, and time  Psychiatric:         Behavior: Behavior normal          Assessment/Plan:    Problem List Items Addressed This Visit        Unprioritized    Atypical squamous cells of undetermined significance on cytologic smear of cervix (ASC-US)     Pap test history  2018: NILM  2021: LSIL, HPV neg  2022: ASCUS, HPV neg  Per [facility]CCP guidelines, patient may have repeat Pap test with HPV testing in 1 year  Status post bilateral salpingectomy - Primary     We reviewed her pathology from tubal sterilization as follows:    - Two segments of benign fallopian tube, each with entire luminal cross section; negative for intraepithelial neoplasia                   Julianna Ponce MD  5/23/2022  9:34 AM

## 2022-05-23 NOTE — ASSESSMENT & PLAN NOTE
Pap test history  2018: NILM  2021: LSIL, HPV neg  2022: ASCUS, HPV neg  Per [facility]P guidelines, patient may have repeat Pap test with HPV testing in 1 year

## 2022-05-23 NOTE — ASSESSMENT & PLAN NOTE
We reviewed her pathology from tubal sterilization as follows: - Two segments of benign fallopian tube, each with entire luminal cross section; negative for intraepithelial neoplasia

## 2022-08-01 ENCOUNTER — OFFICE VISIT (OUTPATIENT)
Dept: OBGYN CLINIC | Facility: CLINIC | Age: 32
End: 2022-08-01

## 2022-08-01 ENCOUNTER — APPOINTMENT (OUTPATIENT)
Dept: LAB | Facility: HOSPITAL | Age: 32
End: 2022-08-01
Payer: COMMERCIAL

## 2022-08-01 VITALS
WEIGHT: 126 LBS | HEIGHT: 62 IN | SYSTOLIC BLOOD PRESSURE: 139 MMHG | BODY MASS INDEX: 23.19 KG/M2 | DIASTOLIC BLOOD PRESSURE: 82 MMHG | HEART RATE: 96 BPM | RESPIRATION RATE: 18 BRPM

## 2022-08-01 DIAGNOSIS — Z01.89 ENCOUNTER FOR URINE TEST: Primary | ICD-10-CM

## 2022-08-01 DIAGNOSIS — Z11.3 ENCOUNTER FOR SCREENING EXAMINATION FOR SEXUALLY TRANSMITTED DISEASE: ICD-10-CM

## 2022-08-01 LAB
HBV SURFACE AG SER QL: NORMAL
HCV AB SER QL: NORMAL
RPR SER QL: NORMAL
SL AMB  POCT GLUCOSE, UA: NORMAL
SL AMB LEUKOCYTE ESTERASE,UA: NORMAL
SL AMB POCT BILIRUBIN,UA: NORMAL
SL AMB POCT BLOOD,UA: NORMAL
SL AMB POCT CLARITY,UA: CLEAR
SL AMB POCT COLOR,UA: YELLOW
SL AMB POCT KETONES,UA: NORMAL
SL AMB POCT NITRITE,UA: NORMAL
SL AMB POCT PH,UA: 5
SL AMB POCT SPECIFIC GRAVITY,UA: 1.03
SL AMB POCT URINE PROTEIN: 30
SL AMB POCT UROBILINOGEN: 1

## 2022-08-01 PROCEDURE — 99213 OFFICE O/P EST LOW 20 MIN: CPT | Performed by: OBSTETRICS & GYNECOLOGY

## 2022-08-01 PROCEDURE — 87491 CHLMYD TRACH DNA AMP PROBE: CPT

## 2022-08-01 PROCEDURE — 86803 HEPATITIS C AB TEST: CPT

## 2022-08-01 PROCEDURE — 87389 HIV-1 AG W/HIV-1&-2 AB AG IA: CPT

## 2022-08-01 PROCEDURE — 81002 URINALYSIS NONAUTO W/O SCOPE: CPT | Performed by: OBSTETRICS & GYNECOLOGY

## 2022-08-01 PROCEDURE — 36415 COLL VENOUS BLD VENIPUNCTURE: CPT

## 2022-08-01 PROCEDURE — 87086 URINE CULTURE/COLONY COUNT: CPT

## 2022-08-01 PROCEDURE — 87340 HEPATITIS B SURFACE AG IA: CPT

## 2022-08-01 PROCEDURE — 87591 N.GONORRHOEAE DNA AMP PROB: CPT

## 2022-08-01 PROCEDURE — 86592 SYPHILIS TEST NON-TREP QUAL: CPT

## 2022-08-01 NOTE — PROGRESS NOTES
OB/GYN VISIT  Florencia Knapp  8/1/2022  9:44 AM      Assessment/Plan:    Florencia Knapp is a 34 y o  [id number] female with pelvic pain most likely secondary to ovulation and menses given the timing, intensity, and improvement with NSAIDs  Continue to monitor at this time for signs of worsening and RTC as needed  STD screening ordered today  Urinary culture ordered    Subjective:     Florencia Knapp is a 34 y o  [id number] female who presents for evaluation of pelvic pain  She reports it starts about a week before her period, continues through her period, and resolves afterward  She recently had a tubal ligation on 5/6/2022 and got her first period after the procedure a week ago  The pelvic pain is alleviated by low dose Motrin  It does not impact her daily life  Last TVUS in 8/2020 normal  She denies fevers, chills, hematuria, dysuria, urinary frequency and urgency  She would like STD screening today  Objective:    Vitals: Blood pressure 139/82, pulse 96, resp  rate 18, height 5' 2" (1 575 m), weight 57 2 kg (126 lb), last menstrual period 07/15/2022, not currently breastfeeding  Body mass index is 23 05 kg/m²  Past Medical History:   Diagnosis Date    Allergic     seasonal-dust, pollen, weeds, grass    Anxiety     History of palpitations     with anxiety    Varicella     Wears contact lenses     Wears glasses      Past Surgical History:   Procedure Laterality Date    [place] LAP,RMV  ADNEXAL STRUCTURE Bilateral 5/6/2022    Procedure: SALPINGECTOMY, LAPAROSCOPIC;  Surgeon: Escobar Anderson MD;  [facility];  Service: Gynecology    RHINOPLASTY      VAGINAL DELIVERY         Physical Exam  HENT:      Head: Normocephalic  Eyes:      Conjunctiva/sclera: Conjunctivae normal    Cardiovascular:      Rate and Rhythm: Normal rate  Pulses: Normal pulses  Pulmonary:      Effort: Pulmonary effort is normal    Abdominal:      General: There is no distension  Palpations: Abdomen is soft  Tenderness: There is no abdominal tenderness  Genitourinary:     General: Normal vulva  Comments: Speculum: Normal vaginal and cervical mucosa  Bimanual: No CMT, no adnexal fullness or tenderness  Wet prep/KOH neg  Skin:     General: Skin is warm  Neurological:      Mental Status: She is alert and oriented to person, place, and time  Psychiatric:         Mood and Affect: Mood normal        Udip with trace leukocytes and nitrites      Edith Dance, MD  8/1/2022  9:44 AM

## 2022-08-02 LAB
BACTERIA UR CULT: NORMAL
C TRACH DNA SPEC QL NAA+PROBE: NEGATIVE
HIV 1+2 AB+HIV1 P24 AG SERPL QL IA: NORMAL
N GONORRHOEA DNA SPEC QL NAA+PROBE: NEGATIVE

## 2022-09-06 ENCOUNTER — OFFICE VISIT (OUTPATIENT)
Dept: FAMILY MEDICINE CLINIC | Facility: OTHER | Age: 32
End: 2022-09-06
Payer: COMMERCIAL

## 2022-09-06 VITALS
OXYGEN SATURATION: 98 % | DIASTOLIC BLOOD PRESSURE: 80 MMHG | RESPIRATION RATE: 18 BRPM | HEIGHT: 62 IN | TEMPERATURE: 98 F | BODY MASS INDEX: 23.7 KG/M2 | HEART RATE: 82 BPM | SYSTOLIC BLOOD PRESSURE: 112 MMHG | WEIGHT: 128.8 LBS

## 2022-09-06 DIAGNOSIS — M25.521 RIGHT ELBOW PAIN: ICD-10-CM

## 2022-09-06 DIAGNOSIS — M25.531 RIGHT WRIST PAIN: Primary | ICD-10-CM

## 2022-09-06 PROCEDURE — 99213 OFFICE O/P EST LOW 20 MIN: CPT

## 2022-09-06 RX ORDER — METHYLPREDNISOLONE 4 MG/1
TABLET ORAL
Qty: 21 TABLET | Refills: 0 | Status: SHIPPED | OUTPATIENT
Start: 2022-09-06 | End: 2022-10-11

## 2022-09-06 RX ORDER — IBUPROFEN 200 MG
200 TABLET ORAL EVERY 6 HOURS PRN
COMMUNITY

## 2022-09-06 NOTE — PROGRESS NOTES
Assessment/Plan:    No problem-specific Assessment & Plan notes found for this encounter  Diagnoses and all orders for this visit:    Right wrist pain  Comments:  Pt's presentation is concerning for cubital tunnel syndrome, ulnar nerve compression/damage, TOS, and less likely CTS or cervical radiculopathy  Check nerve conduction study for possible neuropathy  Pt also to begin treatment with medrolpak per orders  Pt was counseled appropriately about her new medication and agrees to proceed with treatment  She should use wrist brace as much as tolerated  Pt may take up to 200mg ibuprofen q4-6h as needed for elbow and wrist pain for up to 7 days due to history of GI upset with higher doses of NSAIDs and can continue  Orders:  -     methylPREDNISolone 4 MG tablet therapy pack; Use as directed on package  -     EMG 2 Limb Upper Extremity; nerve conduction test only Future    Right elbow pain  -     methylPREDNISolone 4 MG tablet therapy pack; Use as directed on package  -     EMG 2 Limb Upper Extremity; nerve conduction test only Future    Other orders  -     ibuprofen (MOTRIN) 200 mg tablet; Take 200 mg by mouth every 6 (six) hours as needed for mild pain      The patient indicates understanding of these issues and agrees with the plan  Return in about 4 weeks (around 10/4/2022) for wrist pain long visit please, Recheck  Subjective:      Patient ID: Sarah Rosen is a 34 y o  female  Nestor Martinez is a 34 y o  female who presents with R UE pain  Her symptoms are mainly located in her elbow and wrist  Onset was noticed about two years ago  The pain is described as discomfort a few days per week that progressed to a throbbing and pulsing ache that occurs daily  It is moderate in intensity  The pain radiates to her hand and digits 4 and 5  She denies associated neck pain  It is worse in the morning and at times awakens her from sleep   Relieving factors are : ibuprofen 200mg and has attempted sporadic use of wrist splint helped mildly  Aggravating factors are typing all day at work  Associated symptoms are numbness and tingling with a pins and needles sensation, weakness  The following portions of the patient's history were reviewed and updated as appropriate: allergies, current medications, past family history, past medical history, past social history, past surgical history and problem list     Review of Systems   Constitutional: Negative for chills and fever  Cardiovascular: Negative for chest pain and palpitations  Gastrointestinal: Negative for abdominal pain and vomiting  Musculoskeletal: Positive for arthralgias (wrist pain)  Negative for joint swelling  Skin: Negative for color change, rash and wound  Neurological: Positive for weakness (at times right wrist) and numbness  Negative for tremors and speech difficulty  Psychiatric/Behavioral: Positive for sleep disturbance (see HPI)  Objective:      /80   Pulse 82   Temp 98 °F (36 7 °C)   Resp 18   Ht 5' 2" (1 575 m)   Wt 58 4 kg (128 lb 12 8 oz)   SpO2 98%   BMI 23 56 kg/m²          Physical Exam  Vitals reviewed  Constitutional:       General: She is awake  She is not in acute distress  Appearance: Normal appearance  HENT:      Head: Normocephalic and atraumatic  Mouth/Throat:      Mouth: Mucous membranes are moist       Pharynx: Oropharynx is clear  No posterior oropharyngeal erythema  Eyes:      General: No scleral icterus  Pupils: Pupils are equal, round, and reactive to light  Cardiovascular:      Rate and Rhythm: Normal rate and regular rhythm  Pulses: Normal pulses  Heart sounds: Normal heart sounds  No murmur heard  Pulmonary:      Effort: Pulmonary effort is normal  No respiratory distress  Breath sounds: Normal breath sounds  Abdominal:      General: Bowel sounds are normal       Palpations: Abdomen is soft  Tenderness: There is no abdominal tenderness  Musculoskeletal:      Right shoulder: Decreased range of motion (abduction)  Left shoulder: Decreased range of motion (abduction)  Right wrist: No swelling, deformity, effusion, bony tenderness or snuff box tenderness  Normal range of motion  Normal pulse  Left wrist: No swelling, deformity, effusion, bony tenderness or snuff box tenderness  Normal range of motion  Normal pulse  Right hand: No tenderness or bony tenderness  Normal range of motion  Left hand: No tenderness or bony tenderness  Normal range of motion  Right lower leg: No edema  Left lower leg: No edema  Comments: Negative tinel test, prayer test, phalen test, finkelstein test     Skin:     General: Skin is warm and dry  Capillary Refill: Capillary refill takes less than 2 seconds  Coloration: Skin is not pale  Findings: No bruising, erythema or rash  Neurological:      General: No focal deficit present  Mental Status: She is alert and oriented to person, place, and time  Cranial Nerves: Cranial nerves are intact  No cranial nerve deficit  Sensory: Sensation is intact  Motor: Weakness (compared with left upper extremity at wrist and hand, pt's right hand is weaker and has less tight   ) present  No tremor, atrophy or abnormal muscle tone  Coordination: Romberg sign negative  Finger-Nose-Finger Test normal       Gait: Gait is intact  Deep Tendon Reflexes:      Reflex Scores:       Bicep reflexes are 2+ on the right side and 2+ on the left side  Brachioradialis reflexes are 2+ on the right side and 2+ on the left side  Psychiatric:         Attention and Perception: Attention and perception normal          Mood and Affect: Mood and affect normal          Behavior: Behavior normal  Behavior is cooperative

## 2022-09-14 ENCOUNTER — TELEPHONE (OUTPATIENT)
Dept: FAMILY MEDICINE CLINIC | Facility: OTHER | Age: 32
End: 2022-09-14

## 2022-09-14 NOTE — TELEPHONE ENCOUNTER
Left detailed message on [facility] (central scheduling) phone regarding referral   Has been updated to the correct test     Thank you

## 2022-09-14 NOTE — TELEPHONE ENCOUNTER
Araceli Magallon from Central scheduling called and needs to know if patient is to get EMG with Nerve Conduction OR if no EMG still have to put under EMG but in the script notes put "ONLY NERVE CONDUCTION TEST"    Please advise so we can call him back @ [phone]    Thank you

## 2022-10-11 ENCOUNTER — OFFICE VISIT (OUTPATIENT)
Dept: FAMILY MEDICINE CLINIC | Facility: OTHER | Age: 32
End: 2022-10-11
Payer: COMMERCIAL

## 2022-10-11 VITALS
BODY MASS INDEX: 23.96 KG/M2 | HEART RATE: 95 BPM | TEMPERATURE: 97.8 F | HEIGHT: 62 IN | RESPIRATION RATE: 18 BRPM | SYSTOLIC BLOOD PRESSURE: 122 MMHG | WEIGHT: 130.2 LBS | DIASTOLIC BLOOD PRESSURE: 78 MMHG | OXYGEN SATURATION: 98 %

## 2022-10-11 DIAGNOSIS — M25.521 RIGHT ELBOW PAIN: ICD-10-CM

## 2022-10-11 DIAGNOSIS — M25.531 RIGHT WRIST PAIN: Primary | ICD-10-CM

## 2022-10-11 PROCEDURE — 99213 OFFICE O/P EST LOW 20 MIN: CPT

## 2022-10-11 NOTE — PROGRESS NOTES
Name: Sj Lui      : 1990      MRN: [id number]  Encounter Provider: Alessandra Sultana DO  Encounter Date: 10/11/2022   Encounter department: [address] [place] [address] Dr Almanzar  Right wrist pain  Comments:  Sharifa Beltran reports her symptoms are Improved but still with some weakness at times  Exam remarkable today for 5/5 strength BL  She can continue NSAIDs as needed for pain relief such as 400-600mg ibuprofen up to four times daily for up to 7 days  F/u nerve conduction study, and continue to wear brace nightly and as needed for pain  She can use an ergonomic key board and  rest the wrist, and heat can be applied  2  Right elbow pain  Comments:  Pt reports resolvement of symptoms since last appointment with medrol vladimir and bracing  Subjective      Sj Lui is a 34 y o  female who presents for follow up of wrist and elbow pain in her right arm  She states that she was able to get an appointment for her nerve conduction study test, however, it has not yet occurred  She is no longer having elbow pain  She says that taking steroids helped wrist pain significantly and she has been needing her brace less  Certain movement inward is still painful  She notes that she occasionally still gets weakness  Certain movements still  hurt such as wrist flextion  She is taking OTC NSAID as needed and has completed medrol vladimir  Typing worsens her pain  She got an ergonomic wrist pad for her keyboard which so far is helping her pain  Review of Systems   Constitutional: Negative for fever  HENT: Negative for congestion  Eyes: Negative for visual disturbance  Respiratory: Negative for wheezing  Cardiovascular: Negative for leg swelling  Gastrointestinal: Negative for vomiting  Genitourinary: Negative for hematuria  Skin: Negative for rash  Neurological: Positive for weakness (right arm at times, improved)     Psychiatric/Behavioral: Negative for sleep disturbance  Current Outpatient Medications on File Prior to Visit   Medication Sig   • ibuprofen (MOTRIN) 200 mg tablet Take 200 mg by mouth every 6 (six) hours as needed for mild pain (Patient not taking: Reported on 10/13/2022)   • Ibuprofen-Acetaminophen (ADVIL DUAL ACTION PO) Take by mouth (Patient not taking: Reported on 10/13/2022)   • Multiple Vitamin (MULTIVITAMIN) capsule Take 1 capsule by mouth daily   • psyllium (METAMUCIL) 0 52 g capsule Take 1 capsule (0 52 g total) by mouth daily       Objective     /78   Pulse 95   Temp 97 8 °F (36 6 °C)   Resp 18   Ht 5' 2" (1 575 m)   Wt 59 1 kg (130 lb 3 2 oz)   LMP 10/06/2022   SpO2 98%   BMI 23 81 kg/m²     Physical Exam  Constitutional:       General: She is awake  She is not in acute distress  Appearance: Normal appearance  HENT:      Head: Normocephalic and atraumatic  Mouth/Throat:      Mouth: Mucous membranes are moist       Pharynx: Oropharynx is clear  No posterior oropharyngeal erythema  Eyes:      General: No scleral icterus  Pupils: Pupils are equal, round, and reactive to light  Cardiovascular:      Rate and Rhythm: Normal rate and regular rhythm  Pulses: Normal pulses  Heart sounds: Normal heart sounds  No murmur heard  Pulmonary:      Effort: Pulmonary effort is normal  No respiratory distress  Breath sounds: Normal breath sounds  Abdominal:      General: Bowel sounds are normal       Palpations: Abdomen is soft  Tenderness: There is no abdominal tenderness  Musculoskeletal:         General: No tenderness  Normal range of motion  Right elbow: Normal       Left elbow: Normal       Right forearm: Normal       Left forearm: Normal       Right wrist: No swelling, effusion, tenderness, bony tenderness, snuff box tenderness or crepitus  Normal range of motion  Normal pulse  Left wrist: No swelling, effusion, tenderness, bony tenderness, snuff box tenderness or crepitus   Normal range of motion  Normal pulse  Right hand: Normal       Left hand: Normal       Right lower leg: No edema  Left lower leg: No edema  Comments: Mild pain with wrist flexion, lateral and medial deviation  Negative tinel's test, matthew test   Skin:     General: Skin is warm and dry  Capillary Refill: Capillary refill takes less than 2 seconds  Neurological:      General: No focal deficit present  Mental Status: She is alert and oriented to person, place, and time  Cranial Nerves: Cranial nerves are intact  No cranial nerve deficit  Sensory: No sensory deficit  Motor: No weakness  Coordination: Coordination normal       Deep Tendon Reflexes: Reflexes normal    Psychiatric:         Attention and Perception: Attention and perception normal          Mood and Affect: Mood and affect normal          Behavior: Behavior normal  Behavior is cooperative         Sanya Wells DO

## 2022-10-12 NOTE — PROGRESS NOTES
Assessment/Plan:    No problem-specific Assessment & Plan notes found for this encounter  Diagnoses and all orders for this visit:    Bacterial vaginosis  -     metroNIDAZOLE (FLAGYL) 500 mg tablet; Take 1 tablet (500 mg total) by mouth every 12 (twelve) hours for 7 days  Reviewed prevention, information provided return to office if symptoms not resolved  Reviewed safe sexual practices  Vaginal discharge  -     POCT wet mount    Possible exposure to STD  -     HIV 1/2 Antigen/Antibody (4th Generation) w Reflex [facility]  -     Hepatitis B surface antigen  -     RPR  -     Hepatitis C antibody; Future  -     Chlamydia/GC amplified DNA by PCR  Will call results to patient           Subjective:      Patient ID: Annamaria Nicole is a 34 y o  female  HPI 76-year-old  002 here with possible BV infection  She has had symptoms for 2 weeks  Reports a vaginal odor, not a fishy odor she thinks  Has burning on the outside but not on the inside  No unusual discharge  Denies any fever, chills, pelvic pain  Denies any urinary symptoms  LMP 10/06/2022  Symptoms did not improve after her menses  Denied any use of new products  Same partner x5 years, concerned because of infidelity  Desires culture for chlamydia and gonorrhea and other STD testing  Hx of tubal 22  Last pap 2022 ASCUS, negative HR HPV- pap and cotest due in 1 year  The following portions of the patient's history were reviewed and updated as appropriate: allergies, current medications, past family history, past medical history, past social history, past surgical history and problem list     Review of Systems   Constitutional: Negative for chills and fever  Respiratory: Negative  Cardiovascular: Negative  Genitourinary: Negative for dysuria, pelvic pain, urgency and vaginal discharge          Burning          Objective:      /76 (BP Location: Right arm, Patient Position: Sitting, Cuff Size: Standard)   Resp 16   Wt 58 8 kg (129 lb 9 6 oz)   LMP 10/06/2022   BMI 23 70 kg/m²          Physical Exam  Constitutional:       Appearance: Normal appearance  Cardiovascular:      Rate and Rhythm: Normal rate  Pulmonary:      Effort: Pulmonary effort is normal    Abdominal:      Palpations: Abdomen is soft  Tenderness: There is no abdominal tenderness  Neurological:      Mental Status: She is oriented to person, place, and time     Psychiatric:         Mood and Affect: Mood normal          Behavior: Behavior normal            External genitalia-no lesions or erythema  Vagina- creamy white/ yellow discharge  Cervix-negative CMT no lesions  Uterus-anteverted, nontender  Adnexa-no masses nontender    Wet mount KOH-positive fro BV

## 2022-10-13 ENCOUNTER — OFFICE VISIT (OUTPATIENT)
Dept: OBGYN CLINIC | Facility: CLINIC | Age: 32
End: 2022-10-13

## 2022-10-13 VITALS
BODY MASS INDEX: 23.7 KG/M2 | RESPIRATION RATE: 16 BRPM | SYSTOLIC BLOOD PRESSURE: 120 MMHG | WEIGHT: 129.6 LBS | DIASTOLIC BLOOD PRESSURE: 76 MMHG

## 2022-10-13 DIAGNOSIS — N89.8 VAGINAL DISCHARGE: ICD-10-CM

## 2022-10-13 DIAGNOSIS — B96.89 BACTERIAL VAGINOSIS: Primary | ICD-10-CM

## 2022-10-13 DIAGNOSIS — N76.0 BACTERIAL VAGINOSIS: Primary | ICD-10-CM

## 2022-10-13 DIAGNOSIS — Z20.2 POSSIBLE EXPOSURE TO STD: ICD-10-CM

## 2022-10-13 LAB
BV WHIFF TEST VAG QL: ABNORMAL
CLUE CELLS SPEC QL WET PREP: ABNORMAL
PH SMN: 5 [PH]
SL AMB POCT WET MOUNT: ABNORMAL
T VAGINALIS VAG QL WET PREP: ABNORMAL
YEAST VAG QL WET PREP: ABNORMAL

## 2022-10-13 PROCEDURE — 87591 N.GONORRHOEAE DNA AMP PROB: CPT | Performed by: NURSE PRACTITIONER

## 2022-10-13 PROCEDURE — 87210 SMEAR WET MOUNT SALINE/INK: CPT | Performed by: NURSE PRACTITIONER

## 2022-10-13 PROCEDURE — 87491 CHLMYD TRACH DNA AMP PROBE: CPT | Performed by: NURSE PRACTITIONER

## 2022-10-13 PROCEDURE — 99213 OFFICE O/P EST LOW 20 MIN: CPT | Performed by: NURSE PRACTITIONER

## 2022-10-13 RX ORDER — METRONIDAZOLE 500 MG/1
500 TABLET ORAL EVERY 12 HOURS SCHEDULED
Qty: 14 TABLET | Refills: 0 | Status: SHIPPED | OUTPATIENT
Start: 2022-10-13 | End: 2022-10-20

## 2022-10-14 LAB
C TRACH DNA SPEC QL NAA+PROBE: NEGATIVE
N GONORRHOEA DNA SPEC QL NAA+PROBE: NEGATIVE

## 2022-10-17 NOTE — RESULT ENCOUNTER NOTE
Culture for [facility] and CT  Result is negative, please call patient to inform     thanks

## 2022-11-29 ENCOUNTER — PROCEDURE VISIT (OUTPATIENT)
Dept: NEUROLOGY | Facility: CLINIC | Age: 32
End: 2022-11-29

## 2022-11-29 DIAGNOSIS — M25.521 RIGHT ELBOW PAIN: ICD-10-CM

## 2022-11-29 DIAGNOSIS — M25.531 RIGHT WRIST PAIN: ICD-10-CM

## 2022-11-29 NOTE — PROGRESS NOTES
EMG 2 Limb Upper Extremity     Date/Time 11/29/2022 9:33 AM     Performed by  Bronwyn Hughes MD     Authorized by Tex Monique DO                [facility] AT [address]  [phone]    Electromyography & Nerve Conduction Studies Report          Full Name: Mary Ellen Jo Gender: Female  MRN: [id number] YOB: 1990      Visit Date: 11/29/2022 10:11 AM  Age: 34 Years  Examining Physician: Bronwyn Hughes MD   Referring Physician: DR Schumacher Adjutant History: 67-year-old female presents with right upper extremity pain mainly affecting the elbow and wrist which started about 2 years ago  Pain is described as discomfort and at times throbbing in nature of moderate intensity  The pain radiates to her hand and affects digits 4 and 5  She denies associated neck pain  Recently she had a course of steroids which relieved her pain  Hence her referring doctor recommended only a nerve conduction test   AS PER ORDER NCV ONLY  TEMP 33      Sensory Nerve Conduction Study       Nerve / Sites Rec  Site Onset Lat Peak Lat  Amp Segments Distance Velocity     ms ms µV  cm m/s   R Median - Dig II (Antidromic)      Wrist Index 2 2 3 0 36 2 Wrist - Index 13 59      Ref  ?3 5 ? 20 0 Ref  ?50   L Median - Dig II (Antidromic)      Wrist Index 2 2 3 0 58 9 Wrist - Index 13 59      Ref  ?3 5 ? 20 0 Ref  ?50   R Ulnar - Dig V (Antidromic)      Wrist Dig V 1 8 2 7 52 8 Wrist - Dig V 11 62      Ref  ?3 1 ? 17 0 Ref  ?50   L Ulnar - Dig V (Antidromic)      Wrist Dig V 2 0 2 8 55 9 Wrist - Dig V 11 56      Ref  ?3 1 ? 17 0 Ref  ?50   R Radial - Superficial (Antidromic)      Forearm Wrist 1 5 2 1 40 8 Forearm - Wrist 10 66      Ref  ?2 9 ? 15 0 Ref  ?50   L Radial - Superficial (Antidromic)      Forearm Wrist 1 5 2 0 32 4 Forearm - Wrist 10 66      Ref  ?2 9 ? 15 0 Ref  ?50       Motor Nerve Conduction Study       Nerve / Sites Muscle Latency Ref  Amplitude Ref  Segments Distance Lat Diff Velocity Ref  ms ms mV mV  cm ms m/s m/s   R Median - APB      Wrist APB 3 2 ?4 4 4 1 ?4 0 Wrist - APB 7         Elbow APB 6 9  3 8  Elbow - Wrist 20 3 65 55 ?49   L Median - APB      Wrist APB 2 8 ?4 4 8 7 ?4 0 Wrist - APB 7         Elbow APB 6 2  7 7  Elbow - Wrist 20 3 46 58 ?49   R Ulnar - ADM      Wrist ADM 2 2 ?3 3 10 5 ? 6 0 Wrist - ADM 7         B  Elbow ADM 5 6  10 1  B  Elbow - Wrist 19 3 40 56 ?49      A  Elbow ADM 7 2  9 9  A  Elbow - B  Elbow 10 1 60 62 ?49   L Ulnar - ADM      Wrist ADM 2 2 ?3 3 15 7 ?6 0 Wrist - ADM 7         B  Elbow ADM 5 7  15 4  B  Elbow - Wrist 18 5 3 52 53 ?49      A  Elbow ADM 7 3  14 7  A  Elbow - B  Elbow 10 1 54 65 ?52       F Waves       Nerve F Latency Ref  ms ms   R Median - APB  23 9 ?31 0   R Ulnar - ADM  22 9 ?32 0   L Median - APB  21 7 ?31 0   L Ulnar - ADM  23 9 ?32 0                                   Summary        The left and right median and ulnar motor conduction velocities and compound muscle action potentials were normal with normal distal latencies across the wrists  The left and right median and ulnar sensory conduction velocity and sensory action potentials were also normal with normal distal latencies across the wrists  The left and right median and ulnar F wave latencies were within normal limits  The risks and benefits of this procedure have been explained to the patient  Impression:        Normal nerve conduction studies of the bilateral upper extremity

## 2023-05-10 ENCOUNTER — EVALUATION (OUTPATIENT)
Dept: OCCUPATIONAL THERAPY | Facility: CLINIC | Age: 33
End: 2023-05-10

## 2023-05-10 DIAGNOSIS — M25.531 RIGHT WRIST PAIN: Primary | ICD-10-CM

## 2023-05-10 DIAGNOSIS — M25.521 RIGHT ELBOW PAIN: ICD-10-CM

## 2023-05-10 NOTE — PROGRESS NOTES
OT Evaluation     Today's date: 5/10/2023  Patient name: Sylvia Waldrop  : 1990  MRN: [id number]  Referring provider: Ed Rojas DO  Dx:   Encounter Diagnosis     ICD-10-CM    1  Right wrist pain  M25 531 Ambulatory Referral to Physical Therapy      2  Right elbow pain  M25 521 Ambulatory Referral to Physical Therapy                     Assessment  Assessment details: Patient presents with a diagnosis of right wrist and forearm pain  Patient symptoms have been present for 2 years but it recently got worse which prompted them to go to the doctor  Patient initially saw PCP regarding the condition  Patient's most recent follow up with PCP was on 23  Patient has an initial appointment with orthopedics on 23  Patient reports that they have tried medications, a short course of steroids, bracing, exercises, and ergonomic changes to their mouse/keyboard at work, all without noted changes or improvement  Patient had an EMG that was negative for carpal tunnel  Patient presents on this date with increased pain during wrist flexion/extension, and numbness and tingling in all digits  Patient had negative symptoms during all specials tests except ulnar nerve tension testing in supine which caused increased numbness and tingling in the ulnar nerve distribution  Patient displayed popping and clicking in the carpal bones during radial and ulnar deviation which was correctable with pressure over the pisiform  Patient was provided custom HEP and instructed on how to complete it  Patient was encouraged to continue to wear the brace especially at nighttime and when symptoms are elevated  See below for a more detailed assessment     Impairments: abnormal coordination, abnormal or restricted ROM, activity intolerance, impaired physical strength and pain with function    Symptom irritability: lowUnderstanding of Dx/Px/POC: good   Prognosis: good    Goals  STGs:    Patient will report decreased pain during "functional movement by 1-2 grades in 4 weeks    Patient will report decreased numbness and tingling in all digits in 4 weeks    Patient will report compliance to nighttime bracing in 4 weeks    Patient will demonstrate an understanding of HEP by end of initial session    LTGs:    Patient will report the resolution of numbness and tingling in all digits in 8 weeks or discharge    Patient will report resolution of pain symptoms during functional movement in 8 weeks or discharge    Patient will display no symptoms during ulnar nerve tension testing in 8 weeks or discharge          Plan  Plan details: Patient presenting to OP OT services due to a diagnosis of R wrist and forearm pain  Patient would benefit from skilled occupational therapy services 1x per week for 8 weeks to return to prior level of function and achieve all of their established goals  Thank you for the referral    Patient would benefit from: custom splinting, skilled occupational therapy and OT eval  Referral necessary: No  Planned modality interventions: ultrasound, thermotherapy: hydrocollator packs and unattended electrical stimulation  Planned therapy interventions: IADL retraining, patient education, self care, manual therapy, orthotic fitting/training, strengthening, stretching, therapeutic activities, therapeutic exercise, home exercise program, functional ROM exercises and fine motor coordination training  Frequency: 1x week  Duration in visits: 10  Plan of Care beginning date: 5/10/2023  Plan of Care expiration date: 7/10/2023  Treatment plan discussed with: patient        Subjective Evaluation    History of Present Illness  Mechanism of injury: Mechanism: Likely overuse from typing at work and school    Subjective:  \"It's hard to explain, it comes and goes\"  \"It just feels tight and it pulses\"             Recurrent probem    Quality of life: good    Pain  Current pain rating: 3  At best pain rating: 3  At worst pain rating: 10  Location: Wrist " and forearm  Quality: throbbing  Relieving factors: medications, rest and support  Aggravating factors: lifting and keyboarding  Progression: worsening    Social Support    Employment status: working ([employer])  Hand dominance: right      Diagnostic Tests  EMG: normal  Treatments  Previous treatment: injection treatment and medication  Current treatment: medication and occupational therapy  Patient Goals  Patient goals for therapy: decreased pain, increased motion and increased strength          Objective     Neurological Testing     Sensation     Wrist/Hand   Left   Intact: light touch    Right   Intact: light touch  Paresthesia: light touch    Active Range of Motion     Left Wrist   Wrist flexion: 55 degrees   Wrist extension: 75 degrees   Radial deviation: 5 degrees   Ulnar deviation: 25 degrees     Right Wrist   Wrist flexion: 50 degrees   Wrist extension: 75 degrees   Radial deviation: 10 degrees   Ulnar deviation: 10 degrees     Left Thumb   Opposition: Full opposition    Right Thumb   Opposition: Full oppostion    Additional Active Range of Motion Details  B/L Full composite fists    Strength/Myotome Testing     Left Wrist/Hand   Normal wrist strength     (2nd hand position)     Trial 1: 44 9    Thumb Strength  Key/Lateral Pinch     Trial 1: 13  Tip/Two-Point Pinch     Trial 1: 9  Palmar/Three-Point Pinch     Trial 1: 12    Right Wrist/Hand   Normal wrist strength     (2nd hand position)     Trial 1: 48 5    Thumb Strength   Key/Lateral Pinch     Trial 1: 14  Tip/Two-Point Pinch     Trial 1: 9  Palmar/Three-Point Pinch     Trial 1: 9    Tests     Right Elbow   Negative Tinel's sign (cubital tunnel)  Right Wrist/Hand   Negative Phalen's sign, Tinel's sign (medial nerve) and Tinel's sign (radial tunnel)       Swelling     Left Wrist/Hand   Circumference wrist: 13 4 cm    Right Wrist/Hand   Circumference wrist: 13 6 cm             Precautions: Universal    Manuals 5/10            IASTM taping             cupping                          Neuro Re-Ed             Median nerve glide                                                                                           Ther Ex             HEP Wrist AROM    Tendon glide    opposition    Median/ulnar nerve glides            Wrist/Digit AROM             Tendon Glide             Opposition marble/abd             Wrist maze             TB on Wall             Wrist isometrics                                                    Ther Activity             keypegs             Colored pegs                                                    Modalities             P

## 2023-05-17 ENCOUNTER — OFFICE VISIT (OUTPATIENT)
Dept: OCCUPATIONAL THERAPY | Facility: CLINIC | Age: 33
End: 2023-05-17

## 2023-05-17 DIAGNOSIS — M25.531 RIGHT WRIST PAIN: Primary | ICD-10-CM

## 2023-05-17 NOTE — PROGRESS NOTES
"Daily Note     Today's date: 2023  Patient name: Amanuel Rehman  : 1990  MRN: [id number]  Referring provider: Maria Eugenia Berkowitz DO  Dx:   Encounter Diagnosis     ICD-10-CM    1  Right wrist pain  M25 531                      Subjective: \"On Monday I was driving home and the middle of my wrist and palm was numb and tingling\"  Objective: See treatment diary below      Assessment: Tolerated treatment well  Patient would benefit from continued OT  Patient continues to have numbness and tingling in the palm of the hand that comes and goes  Patient fatigued following exercises  Decreased dexterity noted during keypegs due to fatigue  Plan: Continue per plan of care  Progress treatment as tolerated         Precautions: Universal    Manuals 5/10 5/17           IASTM  Carpal Tunnel           taping  Carpal Tunnel                                     Neuro Re-Ed             Median nerve glide  4'           Ulnar Nerve glides  4'                                                                            Ther Ex             HEP Wrist AROM    Tendon glide    opposition    Median/ulnar nerve glides            Wrist/Digit AROM  5'           Wrist maze  x5           TB on Wall  x5           Wrist isometrics             Digiflex  R iso x10    G comp x20           Hand Power Pro  Y center and rim x20           Ext Web  Y x 20           Ther Activity             keypegs  x1           Colored pegs             Pinch Ring  Y/R x1                                     Modalities             MHP  5'                                 "

## 2023-05-24 ENCOUNTER — APPOINTMENT (OUTPATIENT)
Dept: OCCUPATIONAL THERAPY | Facility: CLINIC | Age: 33
End: 2023-05-24
Payer: COMMERCIAL

## 2023-05-31 ENCOUNTER — APPOINTMENT (OUTPATIENT)
Dept: OCCUPATIONAL THERAPY | Facility: CLINIC | Age: 33
End: 2023-05-31
Payer: COMMERCIAL

## 2023-06-01 ENCOUNTER — OFFICE VISIT (OUTPATIENT)
Dept: OBGYN CLINIC | Facility: CLINIC | Age: 33
End: 2023-06-01

## 2023-06-01 VITALS — DIASTOLIC BLOOD PRESSURE: 75 MMHG | HEART RATE: 88 BPM | SYSTOLIC BLOOD PRESSURE: 108 MMHG

## 2023-06-01 DIAGNOSIS — G56.21 CUBITAL TUNNEL SYNDROME ON RIGHT: ICD-10-CM

## 2023-06-01 DIAGNOSIS — G56.01 CARPAL TUNNEL SYNDROME ON RIGHT: Primary | ICD-10-CM

## 2023-06-01 DIAGNOSIS — M25.531 RIGHT WRIST PAIN: ICD-10-CM

## 2023-06-01 DIAGNOSIS — M77.8 TENDINITIS OF FOREARM: ICD-10-CM

## 2023-06-01 NOTE — PROGRESS NOTES
Karl KNOX  Attending, Orthopaedic Surgery  Hand, Wrist, and Elbow Surgery  [facility]      ORTHOPAEDIC HAND, WRIST, AND ELBOW OFFICE  VISIT       ASSESSMENT/PLAN:      28 y o  female with right musculoskeletal junction tenderness (tendinitis), suspected right carpal tunnel syndrome vs median nerve neuritis and possible right cubital tunnel syndrome vs ulnar neuritis     EMG results were reviewed, which were normal  It was discussed with Catherine Ayala that she likely has median nerve neuritis vs carpal tunnel syndrome  If this is early or mild it is possible that the EMG was unable to pick this up  Her ulnar nerve does seem okay, but she may have ulnar nerve neuritis vs cubital tunnel syndrome  A ultrasound of bilateral wrists was ordered to evaluate for right carpal tunnel syndrome vs right median nerve neuritis  Bilateral elbow ultrasounds were ordered to further evaluate for right ulnar neuritis vs cubital tunnel syndrome  At that time a ultrasound guided cubital tunnel CSI may be performed  She was advised to continue with nighttime bracing  OT is the main stay of treatment options regarding tendinitis, OT will likely not improve paraesthesia's  Continue Mobic which her PCP did prescribe her  We discussed the possibility of a right carpal tunnel CSI pending ultrasound results  Follow up in the office after the ultrasounds are complete to review results and further discuss treatment options  The patient verbalized understanding of exam findings and treatment plan  We engaged in the shared decision-making process and treatment options were discussed at length with the patient  Surgical and conservative management discussed today along with risks and benefits  Diagnoses and all orders for this visit:    Carpal tunnel syndrome on right  -     US MSK limited;  Future    Right wrist pain  -     Ambulatory Referral to Hand Surgery    Cubital tunnel syndrome on right  -     US MSK limited; Future  -     US msk guidance; Future    Tendinitis of forearm  -     Ambulatory Referral to PT/OT Hand Therapy; Future      Follow Up:  Return for after ultrasounds are complete   To Do Next Visit:  Re-evaluation of current issue      General Discussions:  Carpal Tunnel Syndrome: The anatomy and physiology of carpal tunnel syndrome was discussed with the patient today  Increase pressure localized under the transverse carpal ligament can cause pain, numbness, tingling, or dysesthesias within the median nerve distribution as well as feelings of fatigue, clumsiness, or awkwardness  These symptoms typically occur at night and worse in the morning upon waking  Eventually, untreated carpal tunnel syndrome can result in weakness and permanent loss of muscle within the thenar compartment of the hand  Treatment options were discussed with the patient  Conservative treatment includes nocturnal resting splints to keep the nerve in a neutral position, ergonomic changes within the work or home environment, activity modification, and tendon gliding exercises  Vitamin B6 one tablet daily over the counter may helpful to reduce symptoms  Steroid injections within the carpal canal can help a majority of patients, however this is often self-limited in a majority of patients  Surgical intervention to divide the transverse carpal ligament typically results in a long-lasting relief of the patient's complaints, with the recurrence rate of less than 1%  Cubital Tunnel Syndrome: The anatomy and physiology of cubital tunnel syndrome were discussed with the patient today in the office    Typically, increased elbow flexion activities decrease blood flow within the intraneural spaces, resulting in a feeling of numbness, tingling, weakness, or clumsiness within the hand and fingers  Occasionally, anatomic structures such as medial elbow osteophytes, the medial head of the triceps, were subluxing ulnar nerve may result in increased pressure or aggravation at the cubital tunnel  Typical signs and symptoms usually include numbness and tingling within the ring and small finger, weakness with , and weakness with pinch  Conservative treatment and includes nocturnal bracing to keep the elbow in a semi-extended position, activity modification, therapy, and avoiding excessive elbow flexion activities  Vitamin B6 one tablet daily over the counter may helpful to reduce symptoms  A majority of patients typically respond to conservative treatment over a period of approximately 3-6 months  EMG/NCV testing of the ulnar nerve at the elbow is not as reliable as carpal tunnel syndrome  Surgical intervention in the form of in situ release of the ulnar nerve at the elbow or ulnar nerve transposition may be required in up to 20% of patients      ____________________________________________________________________________________________________________________________________________      CHIEF COMPLAINT:  Chief Complaint   Patient presents with   • Right Wrist - Pain, Numbness     C/o bilateral wrist pain right is worse than left has been ongoing for about a year  Numbness and tingling going up to right elbow  • Left Wrist - Pain, Numbness     C/o bilateral wrist pain right is worse than left has been ongoing for about a year  SUBJECTIVE:  Maurene Brittle is a 28y o  year old RHD female who presents to the office for right wrist pain as well as right hand numbness and tingling  I am seeing Genetta Senters in consultation at the request of Dr Dyan Ayon  She notes volar wrist pain that radiates into her volat forearm as well as intermittent numbness and tingling to her right hand, which has been ongoing for aprox  1 year but is worsening   She notes volar wrist pain that radiates up her volar forearm will worsen with wrist flexion  She also notes intermittent numbness and tingling to her radial 2 digits at times as well as to her ulnar 2 digits at times along with numbness and tingling into her palm  Numbness and tingling will also worsen with wrist flexion  She has been wearing a wrist brace at night, which she notes is beneficial for her  She notes shooting volar forearm pain does wake her from sleep at night  Ashlie Sawant has completed a few therapy session but has since paused therapy until her appointment to see what is recommend  She is taking Mobic for pain control        Pain/symptom timing:  Worse during the day when active  Pain/symptom context:  Worse with activites and work  Pain/symptom modifying factors:  Rest makes better, activities make worse  Pain/symptom associated signs/symptoms: none    Prior treatment   · NSAIDsYes   · Injections No   · Bracing/Orthotics Yes    Physical Therapy Yes     I have personally reviewed all the relevant PMH, PSH, SH, FH, Medications and allergies      PAST MEDICAL HISTORY:  Past Medical History:   Diagnosis Date   • Allergic     seasonal-dust, pollen, weeds, grass   • Anxiety    • History of palpitations     with anxiety   • Varicella    • Wears contact lenses    • Wears glasses        PAST SURGICAL HISTORY:  Past Surgical History:   Procedure Laterality Date   • CT LAPAROSCOPY W/RMVL ADNEXAL STRUCTURES Bilateral 5/6/2022    Procedure: SALPINGECTOMY, LAPAROSCOPIC;  Surgeon: Ashleigh Wiggins MD;  Location: [facility];  Service: Gynecology   • RHINOPLASTY     • VAGINAL DELIVERY         FAMILY HISTORY:  Family History   Problem Relation Age of Onset   • Diabetes Mother    • Hypertension Mother    • Seizures Mother    • Depression Mother    • Heart disease Father    • Hypertension Father    • Depression Brother    • Depression Son    • ADD / ADHD Son    • No Known Problems Sister    • Diabetes Maternal Grandmother    • Diabetes Paternal Grandmother    • Depression Sister    • No Known Problems Son        SOCIAL HISTORY:  Social History     Tobacco Use   • Smoking status: Never   • Smokeless tobacco: Never   Vaping Use   • Vaping Use: Never used   Substance Use Topics   • Alcohol use: Yes     Comment: socially   • Drug use: No       MEDICATIONS:    Current Outpatient Medications:   •  meloxicam (Mobic) 15 mg tablet, Take 1 tablet (15 mg total) by mouth if needed for mild pain or moderate pain, Disp: 30 tablet, Rfl: 1  •  Multiple Vitamin (MULTIVITAMIN) capsule, Take 1 capsule by mouth daily, Disp: , Rfl:   •  psyllium (METAMUCIL) 0 52 g capsule, Take 1 capsule (0 52 g total) by mouth daily, Disp: 30 capsule, Rfl: 1    ALLERGIES:  Allergies   Allergen Reactions   • Dust Mite Extract    • Pollen Extract    • Tree Extract            REVIEW OF SYSTEMS:  Review of Systems   Constitutional: Negative for chills, fever and unexpected weight change  HENT: Negative for hearing loss, nosebleeds and sore throat  Eyes: Negative for pain, redness and visual disturbance  Respiratory: Negative for cough, shortness of breath and wheezing  Cardiovascular: Negative for chest pain, palpitations and leg swelling  Gastrointestinal: Negative for abdominal pain, nausea and vomiting  Endocrine: Negative for polydipsia and polyuria  Genitourinary: Negative for difficulty urinating and hematuria  Musculoskeletal: Negative for arthralgias, joint swelling and myalgias  Skin: Negative for rash and wound  Neurological: Negative for dizziness, numbness and headaches  Psychiatric/Behavioral: Negative for decreased concentration, dysphoric mood and suicidal ideas  The patient is not nervous/anxious          VITALS:  Vitals:    06/01/23 0922   BP: 108/75   Pulse: 88       LABS:  HgA1c:   Lab Results   Component Value Date    HGBA1C 5 3 05/02/2022     BMP:   Lab Results   Component Value Date    BUN 13 05/02/2022    CALCIUM 9 0 05/02/2022    CL 104 05/02/2022    CO2 28 05/02/2022    CREATININE 0 61 05/02/2022    K 3 7 05/02/2022       _____________________________________________________  PHYSICAL EXAMINATION:  General: well developed and well nourished, alert, oriented times 3 and appears comfortable  Psychiatric: Normal  HEENT: Normocephalic, Atraumatic Trachea Midline, No torticollis  Pulmonary: No audible wheezing or respiratory distress   Abdomen/GI: Non tender, non distended   Cardiovascular: No pitting edema, 2+ radial pulse   Skin: No masses, erythema, lacerations, fluctation, ulcerations  Neurovascular: Sensation Intact to the Median, Ulnar, Radial Nerve, Motor Intact to the Median, Ulnar, Radial Nerve and Pulses Intact  Musculoskeletal: Normal, except as noted in detailed exam and in HPI  MUSCULOSKELETAL EXAMINATION:    Right wrist:     No erythema, ecchymosis or edema  Full wrist ROM, pulling/tightness with ROM   Musculoskeletal junction is tender to palpation  FCU and FCR non tender to palpation   + tinel's at the carpal tunnel   - carpal tunnel compression   - ulnar nerve compression   - tinel's at the cubital tunnel   Full elbow ROM   Full composite fist      ___________________________________________________  STUDIES REVIEWED:  I have personally reviewed bilateral upper extremity EMG which is normal  No evidence of carpal tunnel or cubital tunnel syndrome bilaterally           PROCEDURES PERFORMED:  Procedures  No Procedures performed today    _____________________________________________________      Chang Devine    I,:  Lilliam Rea am acting as a scribe while in the presence of the attending physician :       I,:  Alpa Medina MD personally performed the services described in this documentation    as scribed in my presence :

## 2023-07-12 NOTE — PROGRESS NOTES
Assessment/Plan:    No problem-specific Assessment & Plan notes found for this encounter  Patient  her Depo and return for her injection, reviewed backup method for 7 days  Return to office in 11 weeks for her next Depo  Annual gyn exam is due 2019  Diagnoses and all orders for this visit:    Postpartum follow-up     (spontaneous vaginal delivery)    Encounter for initial prescription of injectable contraceptive  -     POCT urine HCG          Subjective:      Patient ID: Steven Lopez is a 33 y o  female  HPI    69-year-old  022 here for 3 week postpartum check  Patient delivered on  2018, and a baby boy named Liana Newton  She had , she had mild abrasions in the vaginal wall was no repair  She is breastfeeding, denies any problems with her breasts  She has an amount of vaginal bleeding  She is interested in Depo-Provera for contraception  She has used in the past    She may be interested in Mirena IUD in the future, will give her information on its  Reviewed its use, effectiveness, side effects, risks and benefits  Reviewed bleeding pattern  Reviewed irregular bleeding pattern, bone health and waking with use  Will do UPT today  Her annual gyn exam is due in 2019  Annual due in 4 months  [place] score of 6  Patient denies any postpartum depression symptoms  The following portions of the patient's history were reviewed and updated as appropriate: allergies, current medications, past family history, past medical history, past social history, past surgical history and problem list     Review of Systems   Constitutional: Negative  Respiratory: Negative  Cardiovascular: Negative  Genitourinary: Negative  Psychiatric/Behavioral: Negative            Objective:      /70 (BP Location: Right arm, Patient Position: Sitting, Cuff Size: Standard)   Pulse 98   Ht 5' 2" (1 575 m)   Wt 50 8 kg (112 lb)   BMI 20 49 kg/m² Physical Exam   Constitutional: She appears well-nourished  Neck: No thyromegaly present  Cardiovascular: Normal rate, regular rhythm and normal heart sounds  Pulmonary/Chest: Effort normal and breath sounds normal    Abdominal: Soft  There is no tenderness  Skin: Skin is warm and dry  Psychiatric: She has a normal mood and affect   Her behavior is normal        UPT is negative Statement Selected

## 2023-08-22 ENCOUNTER — HOSPITAL ENCOUNTER (OUTPATIENT)
Dept: RADIOLOGY | Facility: HOSPITAL | Age: 33
Discharge: HOME | End: 2023-08-22
Attending: SURGERY

## 2023-10-24 ENCOUNTER — OFFICE VISIT (OUTPATIENT)
Dept: FAMILY MEDICINE CLINIC | Facility: OTHER | Age: 33
End: 2023-10-24
Payer: COMMERCIAL

## 2023-10-24 VITALS
SYSTOLIC BLOOD PRESSURE: 96 MMHG | DIASTOLIC BLOOD PRESSURE: 56 MMHG | WEIGHT: 134 LBS | BODY MASS INDEX: 23.74 KG/M2 | TEMPERATURE: 97.3 F | RESPIRATION RATE: 15 BRPM | HEIGHT: 63 IN | HEART RATE: 64 BPM | OXYGEN SATURATION: 97 %

## 2023-10-24 DIAGNOSIS — Z00.00 ANNUAL PHYSICAL EXAM: Primary | ICD-10-CM

## 2023-10-24 DIAGNOSIS — Z13.21 ENCOUNTER FOR SCREENING FOR NUTRITIONAL DISORDER: ICD-10-CM

## 2023-10-24 DIAGNOSIS — J30.1 SEASONAL ALLERGIC RHINITIS DUE TO POLLEN: ICD-10-CM

## 2023-10-24 DIAGNOSIS — Z13.220 SCREENING FOR CHOLESTEROL LEVEL: ICD-10-CM

## 2023-10-24 DIAGNOSIS — Z13.6 SCREENING FOR CARDIOVASCULAR CONDITION: ICD-10-CM

## 2023-10-24 DIAGNOSIS — F41.9 ANXIETY: ICD-10-CM

## 2023-10-24 DIAGNOSIS — R14.0 ABDOMINAL BLOATING: ICD-10-CM

## 2023-10-24 DIAGNOSIS — Z13.29 SCREENING FOR THYROID DISORDER: ICD-10-CM

## 2023-10-24 DIAGNOSIS — N83.209 CYST OF OVARY, UNSPECIFIED LATERALITY: ICD-10-CM

## 2023-10-24 DIAGNOSIS — N28.1 KIDNEY CYSTS: ICD-10-CM

## 2023-10-24 DIAGNOSIS — Z23 ENCOUNTER FOR IMMUNIZATION: ICD-10-CM

## 2023-10-24 PROCEDURE — 90686 IIV4 VACC NO PRSV 0.5 ML IM: CPT

## 2023-10-24 PROCEDURE — 90471 IMMUNIZATION ADMIN: CPT

## 2023-10-24 PROCEDURE — 99395 PREV VISIT EST AGE 18-39: CPT | Performed by: STUDENT IN AN ORGANIZED HEALTH CARE EDUCATION/TRAINING PROGRAM

## 2023-10-24 RX ORDER — FLUTICASONE PROPIONATE 50 MCG
1 SPRAY, SUSPENSION (ML) NASAL DAILY
Qty: 16 G | Refills: 0 | Status: SHIPPED | OUTPATIENT
Start: 2023-10-24

## 2023-10-24 RX ORDER — SERTRALINE HYDROCHLORIDE 25 MG/1
25 TABLET, FILM COATED ORAL DAILY
Qty: 30 TABLET | Refills: 0 | Status: SHIPPED | OUTPATIENT
Start: 2023-10-24 | End: 2023-11-23

## 2023-10-24 NOTE — PROGRESS NOTES
[address] [facility]  [place]    NAME: Casey Dean  AGE: 28 y.o. SEX: female  : 1990     DATE: 2023     Assessment and Plan:     Problem List Items Addressed This Visit          Other    Anxiety     PHQ9: 8; GAD7:12  Failed trial with Prozac and Lexapro (GI S/E)  Recommend trial of Zoloft   Patient not interested in medications from other classes following discussion; she is considering alternative routes of management but is willing to try another SSRI one last time  Continue Talk therapy          Other Visit Diagnoses       Annual physical exam    -  Primary    Encounter for immunization        Relevant Orders    influenza vaccine, quadrivalent, 0.5 mL, preservative-free, for adult and pediatric patients 6 mos+ (AFLURIA, FLUARIX, FLULAVAL, FLUZONE) (Completed)    Screening for cardiovascular condition        Relevant Orders    Comprehensive metabolic panel (Completed)    Abdominal bloating        No current issues at this time, however the patient is concerned about underlying disorder given frequency of symptoms. Request for GI referral    Relevant Orders    Ambulatory Referral to Gastroenterology    Cyst of ovary, unspecified laterality        Relevant Orders    US pelvis complete non OB    Kidney cysts        Relevant Orders    US pelvis complete non OB    Seasonal allergic rhinitis due to pollen        Relevant Medications    fluticasone (FLONASE) 50 mcg/act nasal spray    Screening for cholesterol level        Relevant Orders    Lipid panel (Completed)    Screening for thyroid disorder        Hx of anxiety. R/O organic cause    Relevant Orders    TSH, 3rd generation with Free T4 reflex (Completed)    Encounter for screening for nutritional disorder        Relevant Orders    Vitamin D 25 hydroxy (Completed)          Immunizations and preventive care screenings were discussed with patient today.  Appropriate education was printed on patient's after visit summary. Counseling:  Alcohol/drug use: discussed moderation in alcohol intake, the recommendations for healthy alcohol use, and avoidance of illicit drug use. Dental Health: discussed importance of regular tooth brushing, flossing, and dental visits. Injury prevention: discussed safety/seat belts, safety helmets, smoke detectors, carbon dioxide detectors, and smoking near bedding or upholstery. Sexual health: discussed sexually transmitted diseases, partner selection, use of condoms, avoidance of unintended pregnancy, and contraceptive alternatives. Exercise: the importance of regular exercise/physical activity was discussed. Recommend exercise 3-5 times per week for at least 30 minutes. Depression Screening and Follow-up Plan: Patient was screened for depression during today's encounter. They screened negative with a PHQ-2 score of 2. Nutrition Assessment and Intervention:     Nutrition patient handout reviewed with patient      Physical Activity Assessment and Intervention:    Physical Activity patient handout reviewed with patient      Emotional and Mental Well-being, Sleep, Connectedness Assessment and Intervention:    PHQ-9 or GAD7 performed for initial evaluation or follow-up        Return in 1 month (on 11/24/2023) for Recheck anxiety, review labs and imaging. Chief Complaint:     Chief Complaint   Patient presents with    Physical Exam     Was volunteering and doing scanning in class cysts were seen on kidneys and ovaries, would like to discuss anxiety      History of Present Illness:     Adult Annual Physical   Patient here for a comprehensive physical exam.   US done at [facility] while volunteering and the patient was informed by the director of the program that there were concerning findings for cysts on her kidneys and ovaries. Patient denies having any associated symptoms or pain.  She does note that her mom had cysts on her ovaries that had to be surgically removed and biopsied. Her sister also had an ovarian cyst that was asymptomatic and required no intervention. Father has high blood pressure, colon CA w/ 1 recurrence, now in remission  Mom also has diabetes  History of anxiety (symptoms included palpitations and difficulty speaking); failed trial with Prozac and Lexapro in the past with GI side effects. The patient has a history of GI issues including intermittent bouts of diarrhea, gassiness, constipation and GERD. She continues with behavioral therapy for anxiety which has been helpful  Patient worked in medical billing and developed symptoms of intermittent carpal tunnel in the past but she has since stepped down from that position and symptoms improved. Ongoing assessment with US coming up and continues with exercises and following with specialist.  Patient reports other life stressors including an 6year old child with mental health issues as well as caring for a toddler. Patient is also currently in school. Diet and Physical Activity  Diet/Nutrition: limited junk food, consuming 3-5 servings of fruits/vegetables daily, and adequate fiber intake. Prefers veggies to fruits--- feels its too sweet. Likes grapes--- generally eats fruits when put in a shake  Exercise: moderate cardiovascular exercise and 1-2 times a week on average.       Depression Screening  PHQ-2/9 Depression Screening    Little interest or pleasure in doing things: 1 - several days  Feeling down, depressed, or hopeless: 1 - several days  Trouble falling or staying asleep, or sleeping too much: 0 - not at all  Feeling tired or having little energy: 2 - more than half the days  Poor appetite or overeatin - more than half the days  Feeling bad about yourself - or that you are a failure or have let yourself or your family down: 1 - several days  Trouble concentrating on things, such as reading the newspaper or watching television: 1 - several days  Moving or speaking so slowly that other people could have noticed. Or the opposite - being so fidgety or restless that you have been moving around a lot more than usual: 0 - not at all  Thoughts that you would be better off dead, or of hurting yourself in some way: 0 - not at all  PHQ-2 Score: 2  PHQ-2 Interpretation: Negative depression screen  PHQ-9 Score: 8   PHQ-9 Interpretation: Mild depression          JEANNE-7 Flowsheet Screening      Flowsheet Row Most Recent Value   Over the last 2 weeks, how often have you been bothered by any of the following problems? Feeling nervous, anxious, or on edge 2   Not being able to stop or control worrying 2   Worrying too much about different things 2   Trouble relaxing 2   Being so restless that it is hard to sit still 1   Becoming easily annoyed or irritable 2   Feeling afraid as if something awful might happen 1   JEANNE-7 Total Score 12              General Health  Sleep: sleeps well and gets 4-6 hours of sleep on average. Hearing: normal - bilateral.  Vision: goes for regular eye exams, most recent eye exam <1 year ago, and wears glasses and contacts. Dental: no dental visits for >1 year and brushes teeth twice daily. /GYN Health  Last menstrual period: end of September  Contraceptive method:  tubes removed . History of STDs?: yes. 2 years ago had chlamydia adequately treated with antibiotics    Advanced Care Planning  Do you have an advanced directive? no  Do you have a durable medical power of ? no     Review of Systems:     Review of Systems   Constitutional:  Positive for fatigue. Negative for chills and fever. HENT:  Positive for congestion, postnasal drip and sinus pressure. Negative for rhinorrhea and sore throat. Eyes:  Negative for visual disturbance. Respiratory:  Negative for cough and shortness of breath. Cardiovascular:  Negative for chest pain, palpitations and leg swelling.    Gastrointestinal:  Negative for abdominal pain, constipation, diarrhea, nausea and vomiting. Endocrine: Negative for cold intolerance, heat intolerance and polyuria. Genitourinary:  Negative for menstrual problem. Musculoskeletal:  Positive for arthralgias. Joint swelling: elbow, wrist and fingers. Allergic/Immunologic: Positive for environmental allergies. Neurological:  Negative for dizziness, syncope, weakness, light-headedness and headaches. Psychiatric/Behavioral:  Positive for dysphoric mood. Negative for sleep disturbance. The patient is nervous/anxious.        Past Medical History:     Past Medical History:   Diagnosis Date    Allergic     seasonal-dust, pollen, weeds, grass    Anxiety     History of palpitations     with anxiety    Varicella     Wears contact lenses     Wears glasses       Past Surgical History:     Past Surgical History:   Procedure Laterality Date    MO LAPAROSCOPY W/RMVL ADNEXAL STRUCTURES Bilateral 5/6/2022    Procedure: SALPINGECTOMY, LAPAROSCOPIC;  Surgeon: Chani Hood MD;  Location: [facility];  Service: Gynecology    RHINOPLASTY      VAGINAL DELIVERY        Social History:     Social History     Socioeconomic History    Marital status: Single     Spouse name: None    Number of children: None    Years of education: None    Highest education level: None   Occupational History    None   Tobacco Use    Smoking status: Never    Smokeless tobacco: Never   Vaping Use    Vaping Use: Never used   Substance and Sexual Activity    Alcohol use: Yes     Comment: socially    Drug use: No    Sexual activity: Yes     Birth control/protection: Female Sterilization   Other Topics Concern    None   Social History Narrative    None     Social Determinants of Health     Financial Resource Strain: Low Risk  (8/1/2022)    Overall Financial Resource Strain (CARDIA)     Difficulty of Paying Living Expenses: Not hard at all   Food Insecurity: No Food Insecurity (8/1/2022)    Hunger Vital Sign     Worried About Running Out of Food in the Last Year: Never true     Ran Out of Food in the Last Year: Never true   Transportation Needs: No Transportation Needs (8/1/2022)    PRAPARE - Transportation     Lack of Transportation (Medical): No     Lack of Transportation (Non-Medical): No   Physical Activity: Insufficiently Active (5/13/2022)    Exercise Vital Sign     Days of Exercise per Week: 1 day     Minutes of Exercise per Session: 60 min   Stress: Stress Concern Present (5/13/2022)    [facility]     Feeling of Stress : Rather much   Social Connections:  Moderately Isolated (5/13/2022)    Social Connection and Isolation Panel [NHANES]     Frequency of Communication with Friends and Family: More than three times a week     Frequency of Social Gatherings with Friends and Family: More than three times a week     Attends Congregational Services: Never     Active Member of Clubs or Organizations: No     Attends Club or Organization Meetings: Never     Marital Status: Living with partner   Intimate Partner Violence: Not At Risk (5/13/2022)    Humiliation, Afraid, Rape, and Kick questionnaire     Fear of Current or Ex-Partner: No     Emotionally Abused: No     Physically Abused: No     Sexually Abused: No   Housing Stability: Low Risk  (8/1/2022)    Housing Stability Vital Sign     Unable to Pay for Housing in the Last Year: No     Number of [address] in the Last Year: 1     Unstable Housing in the Last Year: No      Family History:     Family History   Problem Relation Age of Onset    Diabetes Mother     Hypertension Mother     Seizures Mother     Depression Mother     Colon cancer Father     Heart disease Father     Hypertension Father     No Known Problems Sister     Depression Sister     Depression Brother     Depression Son     ADD / ADHD Son     No Known Problems Son     Diabetes Maternal Grandmother     Diabetes Paternal Grandmother     Breast cancer Neg Hx     Ovarian cancer Neg Hx       Current Medications: Current Outpatient Medications   Medication Sig Dispense Refill    fluticasone (FLONASE) 50 mcg/act nasal spray 1 spray into each nostril daily 16 g 0    Multiple Vitamin (MULTIVITAMIN) capsule Take 1 capsule by mouth daily      psyllium (METAMUCIL) 0.52 g capsule Take 1 capsule (0.52 g total) by mouth daily 30 capsule 1    sertraline (Zoloft) 25 mg tablet Take 1 tablet (25 mg total) by mouth daily (Patient not taking: Reported on 10/31/2023) 30 tablet 0     No current facility-administered medications for this visit. Allergies: Allergies   Allergen Reactions    Dust Mite Extract     Pollen Extract     Tree Extract       Physical Exam:     BP 96/56   Pulse 64   Temp (!) 97.3 °F (36.3 °C)   Resp 15   Ht 5' 2.5" (1.588 m)   Wt 60.8 kg (134 lb)   SpO2 97%   BMI 24.12 kg/m²     Physical Exam  Vitals reviewed. Constitutional:       General: She is not in acute distress. Appearance: Normal appearance. She is normal weight. She is not ill-appearing or toxic-appearing. HENT:      Head: Normocephalic and atraumatic. Right Ear: Ear canal and external ear normal. A middle ear effusion is present. Left Ear: Ear canal and external ear normal. A middle ear effusion is present. Ears:      Comments: Fluid buildup at the back of the eardrums b/l     Nose: Congestion present. Mouth/Throat:      Mouth: Mucous membranes are moist.      Pharynx: Oropharynx is clear. No oropharyngeal exudate or posterior oropharyngeal erythema. Eyes:      Extraocular Movements: Extraocular movements intact. Conjunctiva/sclera: Conjunctivae normal.   Cardiovascular:      Rate and Rhythm: Normal rate and regular rhythm. Pulses: Normal pulses. Heart sounds: Normal heart sounds. Pulmonary:      Effort: Pulmonary effort is normal. No respiratory distress. Abdominal:      General: There is no distension. Palpations: Abdomen is soft. There is no mass. Tenderness:  There is no abdominal tenderness. There is no guarding or rebound. Musculoskeletal:         General: Normal range of motion. Cervical back: Normal range of motion and neck supple. No rigidity or tenderness. Right lower leg: No edema. Left lower leg: No edema. Lymphadenopathy:      Cervical: No cervical adenopathy. Skin:     General: Skin is warm and dry. Neurological:      General: No focal deficit present. Mental Status: She is alert and oriented to person, place, and time.    Psychiatric:         Behavior: Behavior normal.          Rocio Bryant MD   [address] [place] [address]

## 2023-10-24 NOTE — PATIENT INSTRUCTIONS
Wellness Visit for Adults   AMBULATORY CARE:   A wellness visit  is when you see your healthcare provider to get screened for health problems. Your healthcare provider will also give you advice on how to stay healthy. Write down your questions so you remember to ask them. Ask your healthcare provider how often you should have a wellness visit. What happens at a wellness visit:  Your healthcare provider will ask about your health, and your family history of health problems. This includes high blood pressure, heart disease, and cancer. He or she will ask if you have symptoms that concern you, if you smoke, and about your mood. You may also be asked about your intake of medicines, supplements, food, and alcohol. Any of the following may be done: Your weight  will be checked. Your height may also be checked so your body mass index (BMI) can be calculated. Your BMI shows if you are at a healthy weight. Your blood pressure  and heart rate will be checked. Your temperature may also be checked. Blood and urine tests  may be done. Blood tests may be done to check your cholesterol levels. Abnormal cholesterol levels increase your risk for heart disease and stroke. You may also need a blood or urine test to check for diabetes if you are at increased risk. Urine tests may be done to look for signs of an infection or kidney disease. A physical exam  includes checking your heartbeat and lungs with a stethoscope. Your healthcare provider may also check your skin to look for sun damage. Screening tests  may be recommended. A screening test is done to check for diseases that may not cause symptoms. The screening tests you may need depend on your age, gender, family history, and lifestyle habits. For example, colorectal screening may be recommended if you are 48years old or older. Screening tests you need if you are a woman:   A Pap smear  is used to screen for cervical cancer.  Pap smears are usually done every 3 to 5 years depending on your age. You may need them more often if you have had abnormal Pap smear test results in the past. Ask your healthcare provider how often you should have a Pap smear. A mammogram  is an x-ray of your breasts to screen for breast cancer. Experts recommend mammograms every 2 years starting at age 48 years. You may need a mammogram at age 52 years or younger if you have an increased risk for breast cancer. Talk to your healthcare provider about when you should start having mammograms and how often you need them. Vaccines you may need:   Get an influenza vaccine  every year. The influenza vaccine protects you from the flu. Several types of viruses cause the flu. The viruses change over time, so new vaccines are made each year. Get a tetanus-diphtheria (Td) booster vaccine  every 10 years. This vaccine protects you against tetanus and diphtheria. Tetanus is a severe infection that may cause painful muscle spasms and lockjaw. Diphtheria is a severe bacterial infection that causes a thick covering in the back of your mouth and throat. Get a human papillomavirus (HPV) vaccine  if you are female and aged 23 to 32 or male 23 to 24 and never received it. This vaccine protects you from HPV infection. HPV is the most common infection spread by sexual contact. HPV may also cause vaginal, penile, and anal cancers. Get a pneumococcal vaccine  if you are aged 72 years or older. The pneumococcal vaccine is an injection given to protect you from pneumococcal disease. Pneumococcal disease is an infection caused by pneumococcal bacteria. The infection may cause pneumonia, meningitis, or an ear infection. Get a shingles vaccine  if you are 60 or older, even if you have had shingles before. The shingles vaccine is an injection to protect you from the varicella-zoster virus. This is the same virus that causes chickenpox.  Shingles is a painful rash that develops in people who had chickenpox or have been exposed to the virus. How to eat healthy:  My Plate is a model for planning healthy meals. It shows the types and amounts of foods that should go on your plate. Fruits and vegetables make up about half of your plate, and grains and protein make up the other half. A serving of dairy is included on the side of your plate. The amount of calories and serving sizes you need depends on your age, gender, weight, and height. Examples of healthy foods are listed below:  Eat a variety of vegetables  such as dark green, red, and orange vegetables. You can also include canned vegetables low in sodium (salt) and frozen vegetables without added butter or sauces. Eat a variety of fresh fruits , canned fruit in 100% juice, frozen fruit, and dried fruit. Include whole grains. At least half of the grains you eat should be whole grains. Examples include whole-wheat bread, wheat pasta, brown rice, and whole-grain cereals such as oatmeal.    Eat a variety of protein foods such as seafood (fish and shellfish), lean meat, and poultry without skin (turkey and chicken). Examples of lean meats include pork leg, shoulder, or tenderloin, and beef round, sirloin, tenderloin, and extra lean ground beef. Other protein foods include eggs and egg substitutes, beans, peas, soy products, nuts, and seeds. Choose low-fat dairy products such as skim or 1% milk or low-fat yogurt, cheese, and cottage cheese. Limit unhealthy fats  such as butter, hard margarine, and shortening. Exercise:  Exercise at least 30 minutes per day on most days of the week. Some examples of exercise include walking, biking, dancing, and swimming. You can also fit in more physical activity by taking the stairs instead of the elevator or parking farther away from stores. Include muscle strengthening activities 2 days each week. Regular exercise provides many health benefits.  It helps you manage your weight, and decreases your risk for type 2 diabetes, heart disease, stroke, and high blood pressure. Exercise can also help improve your mood. Ask your healthcare provider about the best exercise plan for you. General health and safety guidelines:   Do not smoke. Nicotine and other chemicals in cigarettes and cigars can cause lung damage. Ask your healthcare provider for information if you currently smoke and need help to quit. E-cigarettes or smokeless tobacco still contain nicotine. Talk to your healthcare provider before you use these products. Limit alcohol. A drink of alcohol is 12 ounces of beer, 5 ounces of wine, or 1½ ounces of liquor. Lose weight, if needed. Being overweight increases your risk of certain health conditions. These include heart disease, high blood pressure, type 2 diabetes, and certain types of cancer. Protect your skin. Do not sunbathe or use tanning beds. Use sunscreen with a SPF 15 or higher. Apply sunscreen at least 15 minutes before you go outside. Reapply sunscreen every 2 hours. Wear protective clothing, hats, and sunglasses when you are outside. Drive safely. Always wear your seatbelt. Make sure everyone in your car wears a seatbelt. A seatbelt can save your life if you are in an accident. Do not use your cell phone when you are driving. This could distract you and cause an accident. Pull over if you need to make a call or send a text message. Practice safe sex. Use latex condoms if are sexually active and have more than one partner. Your healthcare provider may recommend screening tests for sexually transmitted infections (STIs). Wear helmets, lifejackets, and protective gear. Always wear a helmet when you ride a bike or motorcycle, go skiing, or play sports that could cause a head injury. Wear protective equipment when you play sports. Wear a lifejacket when you are on a boat or doing water sports.     © Copyright [facility] 2023 Information is for End User's use only and may not be sold, redistributed or otherwise used for commercial purposes. The above information is an  only. It is not intended as medical advice for individual conditions or treatments. Talk to your doctor, nurse or pharmacist before following any medical regimen to see if it is safe and effective for you.

## 2023-10-31 ENCOUNTER — APPOINTMENT (OUTPATIENT)
Dept: LAB | Facility: CLINIC | Age: 33
End: 2023-10-31
Payer: COMMERCIAL

## 2023-10-31 ENCOUNTER — ANNUAL EXAM (OUTPATIENT)
Dept: OBGYN CLINIC | Facility: CLINIC | Age: 33
End: 2023-10-31

## 2023-10-31 VITALS
RESPIRATION RATE: 18 BRPM | WEIGHT: 134 LBS | BODY MASS INDEX: 23.74 KG/M2 | SYSTOLIC BLOOD PRESSURE: 108 MMHG | HEIGHT: 63 IN | HEART RATE: 84 BPM | DIASTOLIC BLOOD PRESSURE: 76 MMHG

## 2023-10-31 DIAGNOSIS — Z13.21 ENCOUNTER FOR SCREENING FOR NUTRITIONAL DISORDER: ICD-10-CM

## 2023-10-31 DIAGNOSIS — Z01.411 ENCOUNTER FOR GYNECOLOGICAL EXAMINATION WITH ABNORMAL FINDING: Primary | ICD-10-CM

## 2023-10-31 DIAGNOSIS — Z13.29 SCREENING FOR THYROID DISORDER: ICD-10-CM

## 2023-10-31 DIAGNOSIS — Z13.220 SCREENING FOR CHOLESTEROL LEVEL: ICD-10-CM

## 2023-10-31 DIAGNOSIS — Z20.2 POSSIBLE EXPOSURE TO STD: ICD-10-CM

## 2023-10-31 DIAGNOSIS — Z13.6 SCREENING FOR CARDIOVASCULAR CONDITION: ICD-10-CM

## 2023-10-31 DIAGNOSIS — R87.610 ATYPICAL SQUAMOUS CELLS OF UNDETERMINED SIGNIFICANCE ON CYTOLOGIC SMEAR OF CERVIX (ASC-US): ICD-10-CM

## 2023-10-31 PROBLEM — N89.8 VAGINAL LEUKORRHEA: Status: RESOLVED | Noted: 2021-05-21 | Resolved: 2023-10-31

## 2023-10-31 PROBLEM — N89.8 VAGINAL DISCHARGE: Status: RESOLVED | Noted: 2021-05-21 | Resolved: 2023-10-31

## 2023-10-31 PROBLEM — Z30.2 REQUEST FOR STERILIZATION: Status: RESOLVED | Noted: 2022-04-01 | Resolved: 2023-10-31

## 2023-10-31 PROBLEM — Z97.5 IUD (INTRAUTERINE DEVICE) IN PLACE: Status: RESOLVED | Noted: 2019-11-26 | Resolved: 2023-10-31

## 2023-10-31 LAB
25(OH)D3 SERPL-MCNC: 31.8 NG/ML (ref 30–100)
ALBUMIN SERPL BCP-MCNC: 4.7 G/DL (ref 3.5–5)
ALP SERPL-CCNC: 67 U/L (ref 34–104)
ALT SERPL W P-5'-P-CCNC: 25 U/L (ref 7–52)
ANION GAP SERPL CALCULATED.3IONS-SCNC: 4 MMOL/L
AST SERPL W P-5'-P-CCNC: 21 U/L (ref 13–39)
BILIRUB SERPL-MCNC: 0.45 MG/DL (ref 0.2–1)
BUN SERPL-MCNC: 11 MG/DL (ref 5–25)
CALCIUM SERPL-MCNC: 9 MG/DL (ref 8.4–10.2)
CHLORIDE SERPL-SCNC: 104 MMOL/L (ref 96–108)
CHOLEST SERPL-MCNC: 144 MG/DL
CO2 SERPL-SCNC: 30 MMOL/L (ref 21–32)
CREAT SERPL-MCNC: 0.51 MG/DL (ref 0.6–1.3)
GFR SERPL CREATININE-BSD FRML MDRD: 125 ML/MIN/1.73SQ M
GLUCOSE P FAST SERPL-MCNC: 80 MG/DL (ref 65–99)
HDLC SERPL-MCNC: 61 MG/DL
LDLC SERPL CALC-MCNC: 70 MG/DL (ref 0–100)
NONHDLC SERPL-MCNC: 83 MG/DL
POTASSIUM SERPL-SCNC: 3.7 MMOL/L (ref 3.5–5.3)
PROT SERPL-MCNC: 7.9 G/DL (ref 6.4–8.4)
SODIUM SERPL-SCNC: 138 MMOL/L (ref 135–147)
TRIGL SERPL-MCNC: 66 MG/DL
TSH SERPL DL<=0.05 MIU/L-ACNC: 1.03 UIU/ML (ref 0.45–4.5)

## 2023-10-31 PROCEDURE — G0124 SCREEN C/V THIN LAYER BY MD: HCPCS | Performed by: STUDENT IN AN ORGANIZED HEALTH CARE EDUCATION/TRAINING PROGRAM

## 2023-10-31 PROCEDURE — 82306 VITAMIN D 25 HYDROXY: CPT

## 2023-10-31 PROCEDURE — 80061 LIPID PANEL: CPT

## 2023-10-31 PROCEDURE — 36415 COLL VENOUS BLD VENIPUNCTURE: CPT

## 2023-10-31 PROCEDURE — G0145 SCR C/V CYTO,THINLAYER,RESCR: HCPCS | Performed by: STUDENT IN AN ORGANIZED HEALTH CARE EDUCATION/TRAINING PROGRAM

## 2023-10-31 PROCEDURE — 87591 N.GONORRHOEAE DNA AMP PROB: CPT | Performed by: NURSE PRACTITIONER

## 2023-10-31 PROCEDURE — 84443 ASSAY THYROID STIM HORMONE: CPT

## 2023-10-31 PROCEDURE — 87491 CHLMYD TRACH DNA AMP PROBE: CPT | Performed by: NURSE PRACTITIONER

## 2023-10-31 PROCEDURE — G0476 HPV COMBO ASSAY CA SCREEN: HCPCS | Performed by: NURSE PRACTITIONER

## 2023-10-31 PROCEDURE — 80053 COMPREHEN METABOLIC PANEL: CPT

## 2023-10-31 NOTE — PROGRESS NOTES
ASSESSMENT & PLAN: Felicita Dumont is a 28 y.o. [id number] with abnormal gynecologic exam. Abnormal pap. 1.  Routine well woman exam done today  2. Pap and HPV:  The patient's last pap and hpv was 4/29/2022. It was abnormal.It was   ASCUS HPV negative  Pap and cotesting was done today. Current ASCCP Guidelines reviewed. 3.  The following were reviewed in today's visit: STD testing, adequate intake of calcium and vitamin D, exercise, and healthy diet. 4. Gardisil vaccine in women up to age 39 discussed and information was provided. Depression Screening Follow-up Plan: Patient's depression screening was positive with a PHQ-2 score of 3. Their PHQ-9 score was 7. Patient assessed for underlying major depression. They have no active suicidal ideations. Brief counseling provided and recommend additional follow-up/re-evaluation next office visit. She is working with her PCP in this. Has anxiety on Zoloft, has improved. BMI Counseling: Body mass index is 24.12 kg/m². The BMI is normal.     CC:  Annual Gynecologic Examination    HPI: Felicita Dumont is a 28 y.o. [id number] who presents for annual gynecologic examination. She had hx of tubal 5/6/2022   Last ppa 4/29/2022 was ASCUS negative HPV. Previous pap was 4/8/2021 and was LGSIL with negative HR HPV. .Pap in 2018 was negative. Due for pap and cotest today. Her father Dx in 63's with colon cancer. Has been doing a high fiber diet. Reviewed will need early testing. Health Maintenance:    She wears her seatbelt routinely. She does perform irregular monthly self breast exams. She feels safe at home.      Past Medical History:   Diagnosis Date    Allergic     seasonal-dust, pollen, weeds, grass    Anxiety     History of palpitations     with anxiety    Varicella     Wears contact lenses     Wears glasses        Past Surgical History:   Procedure Laterality Date    [place] LAPAROSCOPY W/RMVL ADNEXAL STRUCTURES Bilateral 5/6/2022    Procedure: SALPINGECTOMY, LAPAROSCOPIC;  Surgeon: Cipriano Claros MD;  [facility];  Service: Gynecology    RHINOPLASTY      VAGINAL DELIVERY         Past OB/Gyn History:  OB History          4    Para   2    Term   2       0    AB   2    Living   2         SAB   1    IAB   1    Ectopic   0    Multiple   0    Live Births   2               Pt does not have menstrual issues. Monthly x 5 day   History of sexually transmitted infection: Yes. Chlamydia  History of abnormal pap smears: Yes . Patient is currently sexually active. heterosexual.  The current method of family planning is tubal ligation.     Family History   Problem Relation Age of Onset    Diabetes Mother     Hypertension Mother     Seizures Mother     Depression Mother     Heart disease Father     Hypertension Father     Depression Brother     Depression Son     ADD / ADHD Son     No Known Problems Sister     Diabetes Maternal Grandmother     Diabetes Paternal Grandmother     Depression Sister     No Known Problems Son        Social History:  Social History     Socioeconomic History    Marital status: Single     Spouse name: Not on file    Number of children: Not on file    Years of education: Not on file    Highest education level: Not on file   Occupational History    Not on file   Tobacco Use    Smoking status: Never    Smokeless tobacco: Never   Vaping Use    Vaping Use: Never used   Substance and Sexual Activity    Alcohol use: Yes     Comment: socially    Drug use: No    Sexual activity: Not on file   Other Topics Concern    Not on file   Social History Narrative    Not on file     Social Determinants of Health     Financial Resource Strain: Low Risk  (2022)    Overall Financial Resource Strain (CARDIA)     Difficulty of Paying Living Expenses: Not hard at all   Food Insecurity: No Food Insecurity (2022)    Hunger Vital Sign     Worried About Running Out of Food in the Last Year: Never true     [address] in the Last Year: Never true   Transportation Needs: No Transportation Needs (8/1/2022)    PRAPARE - Transportation     Lack of Transportation (Medical): No     Lack of Transportation (Non-Medical): No   Physical Activity: Insufficiently Active (5/13/2022)    Exercise Vital Sign     Days of Exercise per Week: 1 day     Minutes of Exercise per Session: 60 min   Stress: Stress Concern Present (5/13/2022)    [facility]     Feeling of Stress : Rather much   Social Connections: Moderately Isolated (5/13/2022)    Social Connection and Isolation Panel [NHANES]     Frequency of Communication with Friends and Family: More than three times a week     Frequency of Social Gatherings with Friends and Family: More than three times a week     Attends [employer]ology Services: Never     Active Member of Clubs or Organizations: No     Attends Club or Organization Meetings: Never     Marital Status: Living with partner   Intimate Partner Violence: Not At Risk (5/13/2022)    Humiliation, Afraid, Rape, and Kick questionnaire     Fear of Current or Ex-Partner: No     Emotionally Abused: No     Physically Abused: No     Sexually Abused: No   Housing Stability: Low Risk  (8/1/2022)    Housing Stability Vital Sign     Unable to Pay for Housing in the Last Year: No     Number of [address] in the Last Year: 1     Unstable Housing in the Last Year: No     Presently lives with family. Patient is engaged.   Patient is currently employed and in school    Allergies   Allergen Reactions    Dust Mite Extract     Pollen Extract     Tree Extract          Current Outpatient Medications:     fluticasone (FLONASE) 50 mcg/act nasal spray, 1 spray into each nostril daily, Disp: 16 g, Rfl: 0    Multiple Vitamin (MULTIVITAMIN) capsule, Take 1 capsule by mouth daily, Disp: , Rfl:     psyllium (METAMUCIL) 0.52 g capsule, Take 1 capsule (0.52 g total) by mouth daily, Disp: 30 capsule, Rfl: 1    sertraline (Zoloft) 25 mg tablet, Take 1 tablet (25 mg total) by mouth daily (Patient not taking: Reported on 10/31/2023), Disp: 30 tablet, Rfl: 0      Review of Systems  Constitutional :no fever, feels well, no tiredness, no recent weight gain or loss  ENT: no ear ache, no loss of hearing, no nosebleeds or nasal discharge, no sore throat or hoarseness. Cardiovascular: no complaints of slow or fast heart beat, no chest pain, no palpitations, no leg claudication or lower extremity edema. Respiratory: no complaints of shortness of shortness of breath, no TALLEY  Breasts:no complaints of breast pain, breast lump, or nipple discharge  Gastrointestinal: no complaints of abdominal pain, constipation, nausea, vomiting, or diarrhea or bloody stools  Genitourinary : no complaints of dysuria, incontinence, pelvic pain, no dysmenorrhea, vaginal discharge or abnormal vaginal bleeding and as noted in HPI. Musculoskeletal: no complaints of arthralgia, no myalgia, no joint swelling or stiffness, no limb pain or swelling.   Integumentary: no complaints of skin rash or lesion, itching or dry skin  Neurological: no complaints of headache, no confusion, no numbness or tingling, no dizziness or fainting    Objective      /76 (BP Location: Right arm, Patient Position: Sitting, Cuff Size: Adult)   Pulse 84   Resp 18   Ht 5' 2.5" (1.588 m)   Wt 60.8 kg (134 lb)   LMP 10/17/2023 (Approximate)   BMI 24.12 kg/m²   General:   appears stated age, cooperative, alert normal mood and affect   Neck: normal, supple,trachea midline, no masses   Heart: regular rate and rhythm, S1, S2 normal, no murmur, click, rub or gallop   Lungs: clear to auscultation bilaterally   Breasts: normal appearance, no masses or tenderness, Inspection negative, No nipple retraction or dimpling, No nipple discharge or bleeding, No axillary or supraclavicular adenopathy, Normal to palpation without dominant masses, Taught monthly breast self examination   Abdomen: soft, non-tender, without masses or organomegaly   Vulva: normal female genitalia, Bartholin's, Urethra, Finger normal   Vagina: normal vagina, no discharge, exudate, lesion, or erythema   Urethra: normal   Cervix: Normal, no discharge. PAP done. GCC done. Uterus: normal size, contour, position, consistency, mobility, non-tender and anteverted   Adnexa: normal adnexa and no mass, fullness, tenderness   Lymphatic palpation of lymph nodes in neck, axilla, groin and/or other locations: no lymphadenopathy or masses noted   Skin normal skin turgor and no rashes.    Psychiatric orientation to person, place, and time: normal. mood and affect: normal

## 2023-11-01 LAB
C TRACH DNA SPEC QL NAA+PROBE: NEGATIVE
N GONORRHOEA DNA SPEC QL NAA+PROBE: NEGATIVE

## 2023-11-03 ENCOUNTER — TELEPHONE (OUTPATIENT)
Dept: OBGYN CLINIC | Facility: CLINIC | Age: 33
End: 2023-11-03

## 2023-11-03 NOTE — TELEPHONE ENCOUNTER
----- Message from Devin Britt [place] sent at 11/1/2023  5:10 PM EDT -----  Please inform pt that her culture for chlamydia and gonorrhea were negative. Thank You!

## 2023-11-07 LAB
LAB AP GYN PRIMARY INTERPRETATION: ABNORMAL
Lab: ABNORMAL
PATH INTERP SPEC-IMP: ABNORMAL

## 2023-11-13 ENCOUNTER — HOSPITAL ENCOUNTER (OUTPATIENT)
Dept: RADIOLOGY | Facility: HOSPITAL | Age: 33
Discharge: HOME | End: 2023-11-13
Attending: SURGERY
Payer: COMMERCIAL

## 2023-11-13 DIAGNOSIS — G56.21 CUBITAL TUNNEL SYNDROME ON RIGHT: ICD-10-CM

## 2023-11-13 DIAGNOSIS — G56.01 CARPAL TUNNEL SYNDROME ON RIGHT: ICD-10-CM

## 2023-11-13 PROCEDURE — 76882 US LMTD JT/FCL EVL NVASC XTR: CPT

## 2023-11-14 ENCOUNTER — TELEPHONE (OUTPATIENT)
Dept: OBGYN CLINIC | Facility: CLINIC | Age: 33
End: 2023-11-14

## 2023-11-14 NOTE — TELEPHONE ENCOUNTER
----- Message from Jarrell Lamar, [facility] sent at 11/14/2023  2:13 PM EST -----  Please call pt and inform that her pap was ASCUS negative for HR HPV, it was the same as her previous pap, the pap prior to those 2 paps was LGSIL negative HR HPV.  Reviewed results and recommend a colposcopy due to abnormal pap results, please schedule colposcopy with Dr Andressa Vidales,   Thanks

## 2023-11-14 NOTE — TELEPHONE ENCOUNTER
Called and spoke to patient, informed her of the test results and recommendation.  Patient verbalized understanding, and scheduled a colpo with Dr Tashi Joyner on 11/16/2023

## 2023-11-15 NOTE — PROGRESS NOTES
OB/GYN VISIT  James Begum  11/16/2023  8:58 AM    Subjective:     James Begum is a 28 y.o. [id number] female who presents for colposcopy:    Pap hx:   1/18: NILM, candida noted  4/21: LSIL, candida noted  4/2022. ASCUS HPV negative  10/23: ASCUS, HPV neg      Past Medical History:   Diagnosis Date    Allergic     seasonal-dust, pollen, weeds, grass    Anxiety     History of palpitations     with anxiety    Varicella     Wears contact lenses     Wears glasses      Past Surgical History:   Procedure Laterality Date    [place] LAPAROSCOPY W/RMVL ADNEXAL STRUCTURES Bilateral 5/6/2022    Procedure: SALPINGECTOMY, LAPAROSCOPIC;  Surgeon: Chani Hood MD;  [facility];  Service: Gynecology    RHINOPLASTY      VAGINAL DELIVERY         Objective:    Vitals: Blood pressure 110/76, pulse 80, resp. rate 18, height 5' 2" (1.575 m), weight 60.8 kg (134 lb), last menstrual period 11/10/2023, not currently breastfeeding. Body mass index is 24.51 kg/m². Colposcopy     Date/Time  11/16/2023 8:57 AM     [place] Protocol   Consent: Written consent obtained.   Consent given by: patient  Patient understanding: patient states understanding of the procedure being performed  Patient consent: the patient's understanding of the procedure matches consent given  Procedure consent: procedure consent matches procedure scheduled     Performed by  Frandy Nuñez DO   Authorized by  Frandy Nuñez DO     Pre-procedure details      Prepped with: acetic acid     Indication    ASC-US   Procedure Details   Procedure: Colposcopy w/ cervical biopsy and ECC      Under satisfactory analgesia the patient was prepped and draped in the dorsal lithotomy position: yes      Basalt speculum was placed in the vagina: yes      Under colposcopic examination the transition zone was seen in entirety: yes      Endocervix was curetted using a Kevorkian curette: yes      Cervical biopsy performed with a cervical biopsy punch: yes Monsel's solution was applied: yes      Biopsy(s): yes      Location:  11 o'clock    Specimen to pathology: yes     Post-procedure      Findings: White epithelium      Impression: Low grade cervical dysplasia      Patient tolerance of procedure: Tolerated well, no immediate complications   Comments       ECC plus 11 o'clock biopsy taken  Tolerated very well             Assessment/Plan:  Problem List Items Addressed This Visit       Atypical squamous cells of undetermined significance on cytologic smear of cervix (ASC-US)    Relevant Orders    Colposcopy     Other Visit Diagnoses       Pregnancy test negative    -  Primary    Relevant Orders    POCT urine HCG (Completed)            Performed w/ Dr. Deniz Vázquez for fruux message  RTO if needs LEEP/CKC     Melanie Staff, DO  11/16/2023  8:58 AM        Please note that while the recent CURES Act permits medical records be visible for patient review, clinical documentation is intended for utilization by healthcare professionals. All test results are immediately available through [employer] as well, and we will review results at earliest opportunity and contact you with the appropriate urgency. If you have a question about something you see in your chart, please don't hesitate to ask about it at your next appointment.

## 2023-11-16 ENCOUNTER — PROCEDURE VISIT (OUTPATIENT)
Dept: OBGYN CLINIC | Facility: CLINIC | Age: 33
End: 2023-11-16

## 2023-11-16 VITALS
HEART RATE: 80 BPM | WEIGHT: 134 LBS | SYSTOLIC BLOOD PRESSURE: 110 MMHG | DIASTOLIC BLOOD PRESSURE: 76 MMHG | HEIGHT: 62 IN | RESPIRATION RATE: 18 BRPM | BODY MASS INDEX: 24.66 KG/M2

## 2023-11-16 DIAGNOSIS — R87.610 ATYPICAL SQUAMOUS CELLS OF UNDETERMINED SIGNIFICANCE ON CYTOLOGIC SMEAR OF CERVIX (ASC-US): ICD-10-CM

## 2023-11-16 DIAGNOSIS — Z32.02 PREGNANCY TEST NEGATIVE: Primary | ICD-10-CM

## 2023-11-16 PROBLEM — N76.0 BACTERIAL VAGINOSIS: Status: RESOLVED | Noted: 2021-08-18 | Resolved: 2023-11-16

## 2023-11-16 PROBLEM — Z01.411 ENCOUNTER FOR GYNECOLOGICAL EXAMINATION WITH ABNORMAL FINDING: Status: RESOLVED | Noted: 2023-10-31 | Resolved: 2023-11-16

## 2023-11-16 PROBLEM — Z90.79 STATUS POST BILATERAL SALPINGECTOMY: Status: RESOLVED | Noted: 2022-05-06 | Resolved: 2023-11-16

## 2023-11-16 PROBLEM — B96.89 BACTERIAL VAGINOSIS: Status: RESOLVED | Noted: 2021-08-18 | Resolved: 2023-11-16

## 2023-11-16 PROBLEM — Z20.2 POSSIBLE EXPOSURE TO STD: Status: RESOLVED | Noted: 2022-10-13 | Resolved: 2023-11-16

## 2023-11-16 LAB — SL AMB POCT URINE HCG: NORMAL

## 2023-11-16 PROCEDURE — 57454 BX/CURETT OF CERVIX W/SCOPE: CPT | Performed by: OBSTETRICS & GYNECOLOGY

## 2023-11-16 PROCEDURE — 88305 TISSUE EXAM BY PATHOLOGIST: CPT | Performed by: PATHOLOGY

## 2023-11-22 PROCEDURE — 88305 TISSUE EXAM BY PATHOLOGIST: CPT | Performed by: PATHOLOGY

## 2023-11-28 ENCOUNTER — OFFICE VISIT (OUTPATIENT)
Dept: FAMILY MEDICINE CLINIC | Facility: OTHER | Age: 33
End: 2023-11-28
Payer: COMMERCIAL

## 2023-11-28 VITALS
HEIGHT: 62 IN | BODY MASS INDEX: 24.48 KG/M2 | SYSTOLIC BLOOD PRESSURE: 118 MMHG | HEART RATE: 88 BPM | TEMPERATURE: 98 F | RESPIRATION RATE: 12 BRPM | DIASTOLIC BLOOD PRESSURE: 64 MMHG | WEIGHT: 133 LBS | OXYGEN SATURATION: 99 %

## 2023-11-28 DIAGNOSIS — N83.209 CYST OF OVARY, UNSPECIFIED LATERALITY: ICD-10-CM

## 2023-11-28 DIAGNOSIS — F41.9 ANXIETY: Primary | ICD-10-CM

## 2023-11-28 PROCEDURE — 99213 OFFICE O/P EST LOW 20 MIN: CPT

## 2023-11-28 NOTE — PROGRESS NOTES
Name: Lulu Bird      : 1990      MRN: [id number]  Encounter Provider: Mamie Bia MD  Encounter Date: 2023   Encounter department: [address]     1. Anxiety  Comments:  Failed therapy with Zoloft (and Prozac/Lexapro in the past)  Not interested in pharmacotherapy at this time  Will continue therapy & alternate remedies at home    2. Cyst of ovary, unspecified laterality  Comments:  Pelvic US scheduled    Will follow up with [facility]  results once completed. F/U for next annual physical in 1 year    Depression Screening and Follow-up Plan: Patient was screened for depression during today's encounter. They screened negative with a PHQ-2 score of 1. Subjective      HPI  The patient returns for a follow up visit to recheck anxiety and review labs. She has a history of anxiety failed trials of SSRIs in the past (Lexapro, Prozac) due to GI intolerance. She was started on Zoloft following her last visit but discontinued due to recurrence of GI side effects. She continues with weekly therapy sessions and cut back her work hours to help deal with her stress. For her history of GI disturbances, she is scheduled to see GI next month. Pelvic US scheduled for incidental finding of ovarian cysts at a health fair. She continues to follow with hand specialist for carpal tunnel. Review of Systems   Constitutional:  Negative for chills and fever. HENT:  Negative for rhinorrhea and sore throat. Eyes:  Negative for visual disturbance. Respiratory:  Negative for cough and shortness of breath. Cardiovascular:  Negative for chest pain, palpitations and leg swelling. Gastrointestinal:  Negative for abdominal pain, constipation, diarrhea, nausea and vomiting. Endocrine: Negative for cold intolerance, heat intolerance and polyuria. Genitourinary:  Negative for menstrual problem. Musculoskeletal:  Positive for arthralgias.  Joint swelling: elbow, wrist and fingers. Allergic/Immunologic: Positive for environmental allergies. Neurological:  Negative for dizziness, syncope, weakness, light-headedness and headaches. Psychiatric/Behavioral:  Negative for sleep disturbance. The patient is nervous/anxious. Current Outpatient Medications on File Prior to Visit   Medication Sig    fluticasone (FLONASE) 50 mcg/act nasal spray 1 spray into each nostril daily    Multiple Vitamin (MULTIVITAMIN) capsule Take 1 capsule by mouth daily    psyllium (METAMUCIL) 0.52 g capsule Take 1 capsule (0.52 g total) by mouth daily    sertraline (Zoloft) 25 mg tablet Take 1 tablet (25 mg total) by mouth daily (Patient not taking: Reported on 10/31/2023)       Objective     /64   Pulse 88   Temp 98 °F (36.7 °C)   Resp 12   Ht 5' 2" (1.575 m)   Wt 60.3 kg (133 lb)   LMP 11/10/2023 (Exact Date)   SpO2 99%   BMI 24.33 kg/m²     Physical Exam  Vitals and nursing note reviewed. Constitutional:       General: She is not in acute distress. Appearance: Normal appearance. She is not ill-appearing. HENT:      Head: Normocephalic and atraumatic. Right Ear: External ear normal.      Left Ear: External ear normal.   Eyes:      Extraocular Movements: Extraocular movements intact. Conjunctiva/sclera: Conjunctivae normal.   Pulmonary:      Effort: Pulmonary effort is normal. No respiratory distress. Musculoskeletal:         General: Normal range of motion. Cervical back: Normal range of motion and neck supple. Skin:     General: Skin is warm and dry. Neurological:      General: No focal deficit present. Mental Status: She is alert and oriented to person, place, and time. Cranial Nerves: No cranial nerve deficit. Sensory: No sensory deficit. Motor: No weakness. Psychiatric:         Mood and Affect: Mood is not anxious.          Behavior: Behavior normal.       Jeanna Silver MD

## 2023-11-28 NOTE — PATIENT INSTRUCTIONS
Anxiety   WHAT YOU NEED TO KNOW:   Anxiety is a condition that causes you to feel extremely worried or nervous. The feelings are so strong that they can cause problems with your daily activities or sleep. Anxiety may be triggered by something you fear, or it may happen without a cause. Family or work stress, smoking, caffeine, and alcohol can increase your risk for anxiety. Certain medicines or health conditions can also increase your risk. Anxiety can become a long-term condition if it is not managed or treated. DISCHARGE INSTRUCTIONS:   Call your local emergency number (911 in the [address]) if:   You have chest pain, tightness, or heaviness that may spread to your shoulders, arms, jaw, neck, or back. You think about harming yourself or someone else. Call your doctor if:   Your symptoms get worse or do not get better with treatment. Your anxiety keeps you from doing your regular daily activities. You have new symptoms since your last visit. You have questions or concerns about your condition or care. The following resources are available at any time to help you, if needed:   Contact a suicide prevention organization: For the [employer] Suicide and Crisis Lifeline:     Call or text 11304 41 94 73 a chat on https://[employer].org/chat     Call 7-472.284.8041 (7-396-229-TALK)    For the Suicide Hotline, call 6-397.474.6319 (2-818-XZSCTZA)    For a list of international numbers: https://save.org/find-help/international-resources/  Medicines: You may need any of the following:  Anxiety or antidepressant medicine  may help relieve or prevent anxiety. You may need to take the medicine for several weeks before you begin to feel better. Tell your healthcare provider about any side effects or problems you have with your medicine. The type or amount of medicine may need to be changed. Take your medicine as directed.   Contact your healthcare provider if you think your medicine is not helping or if you have side effects. Tell your provider if you are allergic to any medicine. Keep a list of the medicines, vitamins, and herbs you take. Include the amounts, and when and why you take them. Bring the list or the pill bottles to follow-up visits. Carry your medicine list with you in case of an emergency. Cognitive behavioral therapy (CBT)  teaches you how to identify and change negative thought patterns. Manage anxiety:   Talk to someone about your anxiety. Your healthcare provider may suggest counseling. You might feel more comfortable talking with a friend or family member about your anxiety. Choose someone you know will be supportive and encouraging. Get regular physical activity. Physical activity can lower your stress, improve your mood, and help you sleep better. Work with your healthcare provider to develop a plan that you enjoy. Create a regular sleep schedule. A routine can help you relax before bed. Listen to music, read, or do yoga. Try to go to bed and wake up at the same time every day. Sleep is important for emotional health. Do activities you enjoy. Spend time with friends, or do something fun. Choose activities you are familiar with or comfortable doing. This may help prevent anxiety. Practice deep breathing. Deep breathing can help you relax when you feel anxious. Focus on taking slow, deep breaths several times a day, or during an anxiety attack. Breathe in through your nose and out through your mouth. Deep breathing combined with meditation or listening to music may help you feel calmer. Do not smoke. Nicotine and other chemicals in cigarettes and cigars can increase anxiety. Ask your healthcare provider for information if you currently smoke and need help to quit. E-cigarettes or smokeless tobacco still contain nicotine. Talk to your healthcare provider before you use these products. Do not have caffeine. Caffeine can make your symptoms worse.  Do not have foods or drinks that are meant to increase your energy level. Do not drink alcohol or use drugs. Alcohol and drugs can worsen anxiety or make it hard to manage. Talk to your therapist or healthcare provider if you need help to quit. Follow up with your therapist or doctor as directed: Your healthcare provider will monitor your progress at follow-up visits. Your provider will also monitor your medicine if you take antidepressants and ask if the medicine is helping. Tell your provider about any side effects or problems you have with your medicine. The type or amount of medicine may need to be changed. Write down your questions so you remember to ask them during your visits. For more information or support:   [employer] on Mental Illness  [address] [facility] , [address]  Phone: [phone]  Phone: [phone]  Web Address: http://[employer]/. org  [address] [place] [address] Suicide and [address] [place] [phone]  Phone: 5- 622 - 443  Web Address: [employer]. org OR https://[employer]/chat/    © Copyright [employer] 2023 Information is for End User's use only and may not be sold, redistributed or otherwise used for commercial purposes. The above information is an  only. It is not intended as medical advice for individual conditions or treatments. Talk to your doctor, nurse or pharmacist before following any medical regimen to see if it is safe and effective for you.

## 2023-12-04 PROBLEM — F41.9 ANXIETY: Status: ACTIVE | Noted: 2023-12-04

## 2023-12-04 NOTE — ASSESSMENT & PLAN NOTE
PHQ9: 8; GAD7:12  Failed trial with Prozac and Lexapro (GI S/E)  Recommend trial of Zoloft   Patient not interested in medications from other classes following discussion; she is considering alternative routes of management but is willing to try another SSRI one last time  Continue Talk therapy

## 2023-12-06 ENCOUNTER — OFFICE VISIT (OUTPATIENT)
Dept: GASTROENTEROLOGY | Facility: AMBULARY SURGERY CENTER | Age: 33
End: 2023-12-06
Payer: COMMERCIAL

## 2023-12-06 VITALS
DIASTOLIC BLOOD PRESSURE: 74 MMHG | HEIGHT: 62 IN | HEART RATE: 81 BPM | SYSTOLIC BLOOD PRESSURE: 118 MMHG | OXYGEN SATURATION: 100 % | BODY MASS INDEX: 24.33 KG/M2 | WEIGHT: 132.2 LBS

## 2023-12-06 DIAGNOSIS — R14.0 ABDOMINAL BLOATING: ICD-10-CM

## 2023-12-06 DIAGNOSIS — Z80.0 FAMILY HISTORY OF COLON CANCER IN FATHER: ICD-10-CM

## 2023-12-06 DIAGNOSIS — K59.00 CONSTIPATION, UNSPECIFIED CONSTIPATION TYPE: Primary | ICD-10-CM

## 2023-12-06 PROCEDURE — 99204 OFFICE O/P NEW MOD 45 MIN: CPT | Performed by: PHYSICIAN ASSISTANT

## 2023-12-06 NOTE — LETTER
December 8, 2023     [place], [address] [facility]    Patient: Nery Combs   YOB: 1990   Date of Visit: 12/6/2023       Dear Dr. Annalise Thomas: Thank you for referring Nery Combs to me for evaluation. Below are my notes for this consultation. If you have questions, please do not hesitate to call me. I look forward to following your patient along with you. Sincerely,        Urvashi Bauer PA-C        CC: No Recipients    Paul Hathaway  12/6/2023 12:50 PM  Signed  [facility] - Outpatient Consultation  Nery Combs 28 y.o. female MRN: [id number]  Encounter: [id number]          ASSESSMENT AND PLAN:      1. Longstanding abdominal bloating, constipation, and family history of colon cancer in patient's father; symptomatology may well be functional and/or dietary/stress related, IBS etc., however underlying celiac disease or early inflammatory bowel disease remains to be excluded, she has not had GI work-up for the symptoms    -We will plan for EGD and colonoscopy    -Procedure was explained in detail to the patient at this time including associated risks and benefits, risks including but not limited to infection, perforation and bleeding    -Instructions provided for colonoscopy prep    -We will also check celiac disease antibody profile    -Patient was also advised about avoiding gas producing foods, was given information on FODMAP diet for her reference    ______________________________________________________________________    HPI: 27-year-old female with history of anxiety who presents for evaluation, she says that for a long time (many years) she has dealt with symptoms of bloating and constipation. She says she tried decreasing her dairy intake at 1 point which seemed to help with her symptoms to some extent, has also taken Metamucil.   Recently had lab work showing no evidence of diabetes or thyroid disease. She says that she does not have to push or strain with her bowel movements but she has relatively minimal urgency to have bowel movements and commonly will go 4 to 5 days without a movement. She denies any rectal bleeding or melena. Otherwise denies any unintentional weight loss, any swallowing difficulty, any significant acid reflux or heartburn. She says her sister recently had a normal colonoscopy, however her father was diagnosed with colon cancer in his late 62s. She has never had an EGD or colonoscopy before. REVIEW OF SYSTEMS:    CONSTITUTIONAL: Denies any fever, chills, rigors, and weight loss. HEENT: No earache or tinnitus. Denies hearing loss or visual disturbances. CARDIOVASCULAR: No chest pain or palpitations. RESPIRATORY: Denies any cough, hemoptysis, shortness of breath or dyspnea on exertion. GASTROINTESTINAL: As noted in the History of Present Illness. GENITOURINARY: No problems with urination. Denies any hematuria or dysuria. NEUROLOGIC: No dizziness or vertigo, denies headaches. MUSCULOSKELETAL: Denies any muscle or joint pain. SKIN: Denies skin rashes or itching. ENDOCRINE: Denies excessive thirst. Denies intolerance to heat or cold. PSYCHOSOCIAL: Denies depression or anxiety. Denies any recent memory loss.        Historical Information  Past Medical History:   Diagnosis Date   • Allergic     seasonal-dust, pollen, weeds, grass   • Anxiety    • History of palpitations     with anxiety   • Varicella    • Wears contact lenses    • Wears glasses      Past Surgical History:   Procedure Laterality Date   • AL LAPAROSCOPY W/RMVL ADNEXAL STRUCTURES Bilateral 5/6/2022    Procedure: SALPINGECTOMY, LAPAROSCOPIC;  Surgeon: Hiwot Buitrago MD;  Location:  MAIN OR;  Service: Gynecology   • RHINOPLASTY     • VAGINAL DELIVERY       Social History  Social History     Substance and Sexual Activity   Alcohol Use Yes    Comment: socially     Social History     Substance and Sexual Activity   Drug Use No     Social History     Tobacco Use   Smoking Status Never   Smokeless Tobacco Never     Family History   Problem Relation Age of Onset   • Diabetes Mother    • Hypertension Mother    • Seizures Mother    • Depression Mother    • Colon cancer Father    • Heart disease Father    • Hypertension Father    • No Known Problems Sister    • Depression Sister    • Depression Brother    • Depression Son    • ADD / ADHD Son    • No Known Problems Son    • Diabetes Maternal Grandmother    • Diabetes Paternal Grandmother    • Breast cancer Neg Hx    • Ovarian cancer Neg Hx        Meds/Allergies      Current Outpatient Medications:   •  fluticasone (FLONASE) 50 mcg/act nasal spray  •  Multiple Vitamin (MULTIVITAMIN) capsule  •  psyllium (METAMUCIL) 0.52 g capsule  •  sertraline (Zoloft) 25 mg tablet    Allergies   Allergen Reactions   • Dust Mite Extract    • Pollen Extract    • Tree Extract            Objective    Blood pressure 118/74, pulse 81, height 5' 2" (1.575 m), weight 60 kg (132 lb 3.2 oz), last menstrual period 11/10/2023, SpO2 100 %, not currently breastfeeding. Body mass index is 24.18 kg/m². PHYSICAL EXAM:      General Appearance:   Alert, cooperative, no distress   HEENT:   Normocephalic, atraumatic, anicteric. Neck:  Supple, symmetrical, trachea midline   Lungs:   Clear to auscultation bilaterally; no rales, rhonchi or wheezing; respirations unlabored    Heart[de-identified]   Regular rate and rhythm; no murmur, rub, or gallop. Abdomen:   Soft, non-tender, non-distended; normal bowel sounds; no masses, no organomegaly    Genitalia:   Deferred    Rectal:   Deferred    Extremities:  No cyanosis, clubbing or edema    Pulses:  2+ and symmetric    Skin:  No jaundice, rashes, or lesions    Lymph nodes:  No palpable cervical lymphadenopathy        Lab Results:   No visits with results within 1 Day(s) from this visit.    Latest known visit with results is:   Procedure visit on 11/16/2023   Component Date Value   • URINE HCG 11/16/2023 neg    • Case Report 11/16/2023                      Value:Surgical Pathology Report                         Case: [id number]                                   Authorizing Provider:  Frandy Nuñez DO        Collected:           11/16/2023 0912              Ordering Location:     [facility], [facility]      Received:            11/16/2023 0912                                     [facility] ([place])                                                        Pathologist:           Cheyenne Carrion MD                                                             Specimens:   A) - Cervix, Endocervical, ECC                                                                      B) - Cervix, 1:00                                                                         • Final Diagnosis 11/16/2023                      Value: This result contains rich text formatting which cannot be displayed here. • Additional Information 11/16/2023                      Value: This result contains rich text formatting which cannot be displayed here. • Gross Description 11/16/2023                      Value: This result contains rich text formatting which cannot be displayed here. Radiology Results:   [address] [place] limited    Result Date: 11/13/2023  Narrative: ULTRASOUND BILATERAL ELBOW (CUBITAL TUNNEL SYNDROME PROTOCOL) INDICATION:   G56.21: Lesion of ulnar nerve, right upper limb. TECHNIQUE:   Using a high frequency transducer, the right and left ulnar nerves were individually scanned to evaluate for cubital tunnel syndrome and presence of ulnar nerve dislocation. Gray scale and color doppler ultrasound was used. COMPARISON:  None FINDINGS: RIGHT SIDE: Ulnar nerve cross sectional area proximally: 5.2 sq mm. Maximum ulnar nerve cross sectional area within or near the cubital tunnel: 10.1 sq mm. Ulnar nerve cross sectional area distal to cubital tunnel: 3.6 sq mm.  Maximal ulnar nerve/proximal cross sectional ratio: 1.9 No evidence of ulnar nerve dislocation from the cubital tunnel on dynamic flexion-extension evaluation. No accessory anconeus epitrochlearis muscle. LEFT SIDE: Ulnar nerve cross sectional area proximally: 4.3 sq mm. Maximum ulnar nerve cross sectional area within or near the cubital tunnel: 9 sq mm. Ulnar nerve cross sectional area distal to cubital tunnel: 4.4 sq mm. Maximal ulnar nerve/proximal cross sectional ratio: 2.1 No evidence of ulnar nerve dislocation from the cubital tunnel on dynamic flexion-extension evaluation. No accessory anconeus epitrochlearis muscle. Impression: RIGHT CUBITAL TUNNEL: Enlarged ulnar nerve near/within cubital tunnel suspicious for ulnar neuropathy/cubital tunnel syndrome. LEFT CUBITAL TUNNEL: Enlarged ulnar nerve near/within cubital tunnel, suggestive of ulnar neuropathy/cubital tunnel syndrome. Note: Ultrasound diagnosis of cubital tunnel syndrome is suggested with visualization of hypoechoic enlargement of the ulnar nerve just proximal to the cubital tunnel, usually with a transition to normal size within the cubital tunnel. A cross sectional  area of ulnar nerve greater than 9 sq mm at the site of maximal enlargement or increase in size with a ratio of greater than 2.8 compared to the proximal is considered abnormal. Reference: ZAHIDA Jacobs, MS [place]: Ultrasonographic Swelling Ratio in the Diagnosis of Ulnar Neuropathy at the Elbow. Muscle Nerve. 2008 Oct;38(4):1231-5. https://pubmed.ncbi.nlm.nih.gov/20727070/ Workstation performed: [address] [facility] limited    Result Date: 11/13/2023  Narrative: ULTRASOUND BILATERAL WRISTS (CARPAL TUNNEL SYNDROME PROTOCOL) TECHNIQUE:   Using a high frequency transducer, the right and left median nerves were individually scanned to evaluate for carpal tunnel syndrome. Gray scale and color doppler ultrasound was used. INDICATION:   G56.01: Carpal tunnel syndrome, right upper limb.  COMPARISON:  None FINDINGS: RIGHT SIDE: Median nerve cross sectional area distal forearm (CSAp): 7.1 sq mm. Maximum median nerve cross sectional area within carpal tunnel (CSAc): 13.1 sq mm. Delta CSA (CSAc-CSAp): 6.0 sq mm. Wrist to Forearm ratio (WF ratio) (CSAc/CSAp): 1.8 Vascularity: No hypervascularity. LEFT SIDE: Median nerve cross sectional area distal forearm (CSAp): 7.8 sq mm. Maximum median nerve cross sectional area within carpal tunnel (CSAc): 12.4 sq mm. Delta CSA (CSAc-CSAp): 4.6 sq mm. Wrist to Forearm ratio (WF ratio) (CSAc/CSAp): 1.6 Vascularity: No hypervascularity. Impression: RIGHT SIDE: Findings suspicious for carpal tunnel syndrome, with median nerve WF ratio > 1.4. LEFT SIDE: Findings suspicious for carpal tunnel syndrome, with median nerve WF ratio > 1.4.  Workstation performed: [id number]

## 2023-12-06 NOTE — PATIENT INSTRUCTIONS
Scheduled date of EGD/colonoscopy (as of today): 03/01/24  Physician performing EGD/colonoscopy: Dr. Ty David  [facility] of EGD[facility]colonoscopy[facility]  Desired bowel prep reviewed with patient: [email or web address]  Instructions reviewed with patient by: nw  Clearances:  n/a

## 2023-12-06 NOTE — PROGRESS NOTES
[facility] - Outpatient Consultation  Nehal Sin 28 y.o. female MRN: [id number]  Encounter: [id number]          ASSESSMENT AND PLAN:      1. Longstanding abdominal bloating, constipation, and family history of colon cancer in patient's father; symptomatology may well be functional and/or dietary/stress related, IBS etc., however underlying celiac disease or early inflammatory bowel disease remains to be excluded, she has not had GI work-up for the symptoms    -We will plan for EGD and colonoscopy    -Procedure was explained in detail to the patient at this time including associated risks and benefits, risks including but not limited to infection, perforation and bleeding    -Instructions provided for colonoscopy prep    -We will also check celiac disease antibody profile    -Patient was also advised about avoiding gas producing foods, was given information on FODMAP diet for her reference    ______________________________________________________________________    HPI: 44-year-old female with history of anxiety who presents for evaluation, she says that for a long time (many years) she has dealt with symptoms of bloating and constipation. She says she tried decreasing her dairy intake at 1 point which seemed to help with her symptoms to some extent, has also taken Metamucil. Recently had lab work showing no evidence of diabetes or thyroid disease. She says that she does not have to push or strain with her bowel movements but she has relatively minimal urgency to have bowel movements and commonly will go 4 to 5 days without a movement. She denies any rectal bleeding or melena. Otherwise denies any unintentional weight loss, any swallowing difficulty, any significant acid reflux or heartburn. She says her sister recently had a normal colonoscopy, however her father was diagnosed with colon cancer in his late 62s.   She has never had an EGD or colonoscopy before. REVIEW OF SYSTEMS:    CONSTITUTIONAL: Denies any fever, chills, rigors, and weight loss. HEENT: No earache or tinnitus. Denies hearing loss or visual disturbances. CARDIOVASCULAR: No chest pain or palpitations. RESPIRATORY: Denies any cough, hemoptysis, shortness of breath or dyspnea on exertion. GASTROINTESTINAL: As noted in the History of Present Illness. GENITOURINARY: No problems with urination. Denies any hematuria or dysuria. NEUROLOGIC: No dizziness or vertigo, denies headaches. MUSCULOSKELETAL: Denies any muscle or joint pain. SKIN: Denies skin rashes or itching. ENDOCRINE: Denies excessive thirst. Denies intolerance to heat or cold. PSYCHOSOCIAL: Denies depression or anxiety. Denies any recent memory loss.        Historical Information   Past Medical History:   Diagnosis Date    Allergic     seasonal-dust, pollen, weeds, grass    Anxiety     History of palpitations     with anxiety    Varicella     Wears contact lenses     Wears glasses      Past Surgical History:   Procedure Laterality Date    HI LAPAROSCOPY W/RMVL ADNEXAL STRUCTURES Bilateral 5/6/2022    Procedure: SALPINGECTOMY, LAPAROSCOPIC;  Surgeon: Eric Lovett MD;  Location[facility];  Service: Gynecology    RHINOPLASTY      VAGINAL DELIVERY       Social History   Social History     Substance and Sexual Activity   Alcohol Use Yes    Comment: socially     Social History     Substance and Sexual Activity   Drug Use No     Social History     Tobacco Use   Smoking Status Never   Smokeless Tobacco Never     Family History   Problem Relation Age of Onset    Diabetes Mother     Hypertension Mother     Seizures Mother     Depression Mother     Colon cancer Father     Heart disease Father     Hypertension Father     No Known Problems Sister     Depression Sister     Depression Brother     Depression Son     ADD / ADHD Son     No Known Problems Son     Diabetes Maternal Grandmother     Diabetes Paternal Grandmother     Breast cancer Neg Hx     Ovarian cancer Neg Hx        Meds/Allergies       Current Outpatient Medications:     fluticasone (FLONASE) 50 mcg/act nasal spray    Multiple Vitamin (MULTIVITAMIN) capsule    psyllium (METAMUCIL) 0.52 g capsule    sertraline (Zoloft) 25 mg tablet    Allergies   Allergen Reactions    Dust Mite Extract     Pollen Extract     Tree Extract            Objective     Blood pressure 118/74, pulse 81, height 5' 2" (1.575 m), weight 60 kg (132 lb 3.2 oz), last menstrual period 11/10/2023, SpO2 100 %, not currently breastfeeding. Body mass index is 24.18 kg/m². PHYSICAL EXAM:      General Appearance:   Alert, cooperative, no distress   HEENT:   Normocephalic, atraumatic, anicteric. Neck:  Supple, symmetrical, trachea midline   Lungs:   Clear to auscultation bilaterally; no rales, rhonchi or wheezing; respirations unlabored    Heart[de-identified]   Regular rate and rhythm; no murmur, rub, or gallop. Abdomen:   Soft, non-tender, non-distended; normal bowel sounds; no masses, no organomegaly    Genitalia:   Deferred    Rectal:   Deferred    Extremities:  No cyanosis, clubbing or edema    Pulses:  2+ and symmetric    Skin:  No jaundice, rashes, or lesions    Lymph nodes:  No palpable cervical lymphadenopathy        Lab Results:   No visits with results within 1 Day(s) from this visit.    Latest known visit with results is:   Procedure visit on 11/16/2023   Component Date Value    URINE HCG 11/16/2023 neg     Case Report 11/16/2023                      Value:Surgical Pathology Report                         Case: [id number]                                   Authorizing Provider:  Felecia Stroud DO        Collected:           11/16/2023 0912              Ordering Location:     [address]      Received:            11/16/2023 0912                                     [facility]                                                        Pathologist:           Lonnie Fuentes MD Specimens:   A) - Cervix, Endocervical, ECC                                                                      B) - Cervix, 1:00                                                                          Final Diagnosis 11/16/2023                      Value: This result contains rich text formatting which cannot be displayed here. Additional Information 11/16/2023                      Value: This result contains rich text formatting which cannot be displayed here. Gross Description 11/16/2023                      Value: This result contains rich text formatting which cannot be displayed here. Radiology Results:   [address] [facility] limited    Result Date: 11/13/2023  Narrative: ULTRASOUND BILATERAL ELBOW (CUBITAL TUNNEL SYNDROME PROTOCOL) INDICATION:   G56.21: Lesion of ulnar nerve, right upper limb. TECHNIQUE:   Using a high frequency transducer, the right and left ulnar nerves were individually scanned to evaluate for cubital tunnel syndrome and presence of ulnar nerve dislocation. Gray scale and color doppler ultrasound was used. COMPARISON:  None FINDINGS: RIGHT SIDE: Ulnar nerve cross sectional area proximally: 5.2 sq mm. Maximum ulnar nerve cross sectional area within or near the cubital tunnel: 10.1 sq mm. Ulnar nerve cross sectional area distal to cubital tunnel: 3.6 sq mm. Maximal ulnar nerve/proximal cross sectional ratio: 1.9 No evidence of ulnar nerve dislocation from the cubital tunnel on dynamic flexion-extension evaluation. No accessory anconeus epitrochlearis muscle. LEFT SIDE: Ulnar nerve cross sectional area proximally: 4.3 sq mm. Maximum ulnar nerve cross sectional area within or near the cubital tunnel: 9 sq mm. Ulnar nerve cross sectional area distal to cubital tunnel: 4.4 sq mm. Maximal ulnar nerve/proximal cross sectional ratio: 2.1 No evidence of ulnar nerve dislocation from the cubital tunnel on dynamic flexion-extension evaluation.  No accessory anconeus epitrochlearis muscle. Impression: RIGHT CUBITAL TUNNEL: Enlarged ulnar nerve near/within cubital tunnel suspicious for ulnar neuropathy/cubital tunnel syndrome. LEFT CUBITAL TUNNEL: Enlarged ulnar nerve near/within cubital tunnel, suggestive of ulnar neuropathy/cubital tunnel syndrome. Note: Ultrasound diagnosis of cubital tunnel syndrome is suggested with visualization of hypoechoic enlargement of the ulnar nerve just proximal to the cubital tunnel, usually with a transition to normal size within the cubital tunnel. A cross sectional  area of ulnar nerve greater than 9 sq mm at the site of maximal enlargement or increase in size with a ratio of greater than 2.8 compared to the proximal is considered abnormal. Reference: ZAHIDA Barba, MS [place]: Ultrasonographic Swelling Ratio in the Diagnosis of Ulnar Neuropathy at the Elbow. Muscle Nerve. 2008 Oct;38(4):1231-5. https://pubmed.ncbi.nlm.nih.gov/96653590/ Workstation performed: [address] [facility]    Result Date: 11/13/2023  Narrative: ULTRASOUND BILATERAL WRISTS (CARPAL TUNNEL SYNDROME PROTOCOL) TECHNIQUE:   Using a high frequency transducer, the right and left median nerves were individually scanned to evaluate for carpal tunnel syndrome. Gray scale and color doppler ultrasound was used. INDICATION:   G56.01: Carpal tunnel syndrome, right upper limb. COMPARISON:  None FINDINGS: RIGHT SIDE: Median nerve cross sectional area distal forearm (CSAp): 7.1 sq mm. Maximum median nerve cross sectional area within carpal tunnel (CSAc): 13.1 sq mm. Delta CSA (CSAc-CSAp): 6.0 sq mm. Wrist to Forearm ratio (WF ratio) (CSAc/CSAp): 1.8 Vascularity: No hypervascularity. LEFT SIDE: Median nerve cross sectional area distal forearm (CSAp): 7.8 sq mm. Maximum median nerve cross sectional area within carpal tunnel (CSAc): 12.4 sq mm. Delta CSA (CSAc-CSAp): 4.6 sq mm. Wrist to Forearm ratio (WF ratio) (CSAc/CSAp): 1.6 Vascularity: No hypervascularity. Impression: RIGHT SIDE: Findings suspicious for carpal tunnel syndrome, with median nerve WF ratio > 1.4. LEFT SIDE: Findings suspicious for carpal tunnel syndrome, with median nerve WF ratio > 1.4.  Workstation performed: [id number]

## 2023-12-19 NOTE — PROGRESS NOTES
"Assessment/Plan:    No problem-specific Assessment & Plan notes found for this encounter.    Annual due 10/31/2024       Diagnoses and all orders for this visit:    Vaginal yeast infection  -     fluconazole (DIFLUCAN) 150 mg tablet; Take 1 Pill today and repeat in 3 days  Call if symptoms not resolved  Vaginal discharge  -     POCT wet mount    Possible exposure to STD  -     Chlamydia/GC amplified DNA by PCR  Will call results        Subjective:      Patient ID: Charline Osborne is a 33 y.o. female.    HPI 34 yo here with vaginal irritation. Reports she has burning and itching on her genitals. No burning with urination. She is  on antibiotics for a sinus infection until this Friday  Last culture for chlamydia and gonorrhea was done 10/31/2023 and was negative.  She has history of positive chlamydia 8/2021. Consents to testing for GC and CT today.     The following portions of the patient's history were reviewed and updated as appropriate: allergies, current medications, past family history, past medical history, past social history, past surgical history, and problem list.    Review of Systems   Constitutional:  Negative for chills and fever.   Respiratory: Negative.     Cardiovascular: Negative.    Gastrointestinal: Negative.    Genitourinary:  Positive for vaginal discharge. Negative for dysuria, frequency and pelvic pain.        Irritation and itching         Objective:      /78 (BP Location: Right arm, Patient Position: Sitting, Cuff Size: Adult)   Pulse 90   Resp 18   Ht 5' 2\" (1.575 m)   Wt 59.4 kg (131 lb)   LMP 12/07/2023   BMI 23.96 kg/m²          Physical Exam  Constitutional:       Appearance: Normal appearance.   Cardiovascular:      Rate and Rhythm: Normal rate.   Pulmonary:      Effort: Pulmonary effort is normal.   Abdominal:      Palpations: Abdomen is soft.      Tenderness: There is no abdominal tenderness.           External genitalia-no lesions, slight erythema  Vagina- small " amount white discharge  Cervix-negative CMT no lesions  Uterus-anteverted, nontender  Adnexa-no masses nontender    Wet mount [id number]

## 2023-12-20 ENCOUNTER — OFFICE VISIT (OUTPATIENT)
Dept: OBGYN CLINIC | Facility: CLINIC | Age: 33
End: 2023-12-20

## 2023-12-20 ENCOUNTER — APPOINTMENT (OUTPATIENT)
Dept: LAB | Facility: CLINIC | Age: 33
End: 2023-12-20
Payer: COMMERCIAL

## 2023-12-20 VITALS
DIASTOLIC BLOOD PRESSURE: 78 MMHG | WEIGHT: 131 LBS | BODY MASS INDEX: 24.11 KG/M2 | RESPIRATION RATE: 18 BRPM | SYSTOLIC BLOOD PRESSURE: 106 MMHG | HEART RATE: 90 BPM | HEIGHT: 62 IN

## 2023-12-20 DIAGNOSIS — B37.31 VAGINAL YEAST INFECTION: Primary | ICD-10-CM

## 2023-12-20 DIAGNOSIS — K59.00 CONSTIPATION, UNSPECIFIED CONSTIPATION TYPE: ICD-10-CM

## 2023-12-20 DIAGNOSIS — R14.0 ABDOMINAL BLOATING: ICD-10-CM

## 2023-12-20 DIAGNOSIS — Z20.2 POSSIBLE EXPOSURE TO STD: ICD-10-CM

## 2023-12-20 DIAGNOSIS — N89.8 VAGINAL DISCHARGE: ICD-10-CM

## 2023-12-20 PROBLEM — Z11.3 SCREENING FOR STD (SEXUALLY TRANSMITTED DISEASE): Status: ACTIVE | Noted: 2023-12-20

## 2023-12-20 LAB
BV WHIFF TEST VAG QL: ABNORMAL
CLUE CELLS SPEC QL WET PREP: ABNORMAL
PH SMN: 4 [PH]
SL AMB POCT WET MOUNT: ABNORMAL
T VAGINALIS VAG QL WET PREP: ABNORMAL
YEAST VAG QL WET PREP: ABNORMAL

## 2023-12-20 PROCEDURE — 87591 N.GONORRHOEAE DNA AMP PROB: CPT | Performed by: NURSE PRACTITIONER

## 2023-12-20 PROCEDURE — 82784 ASSAY IGA/IGD/IGG/IGM EACH: CPT

## 2023-12-20 PROCEDURE — 86364 TISS TRNSGLTMNASE EA IG CLAS: CPT

## 2023-12-20 PROCEDURE — 87210 SMEAR WET MOUNT SALINE/INK: CPT | Performed by: NURSE PRACTITIONER

## 2023-12-20 PROCEDURE — 36415 COLL VENOUS BLD VENIPUNCTURE: CPT

## 2023-12-20 PROCEDURE — 87491 CHLMYD TRACH DNA AMP PROBE: CPT | Performed by: NURSE PRACTITIONER

## 2023-12-20 PROCEDURE — 86258 DGP ANTIBODY EACH IG CLASS: CPT

## 2023-12-20 PROCEDURE — 86231 EMA EACH IG CLASS: CPT

## 2023-12-20 PROCEDURE — 99213 OFFICE O/P EST LOW 20 MIN: CPT | Performed by: NURSE PRACTITIONER

## 2023-12-20 RX ORDER — FLUCONAZOLE 150 MG/1
TABLET ORAL
Qty: 2 TABLET | Refills: 0 | Status: SHIPPED | OUTPATIENT
Start: 2023-12-20 | End: 2023-12-23

## 2023-12-21 LAB
ENDOMYSIUM IGA SER QL: NEGATIVE
GLIADIN PEPTIDE IGA SER-ACNC: 5 UNITS (ref 0–19)
GLIADIN PEPTIDE IGG SER-ACNC: 3 UNITS (ref 0–19)
IGA SERPL-MCNC: 329 MG/DL (ref 87–352)
TTG IGA SER-ACNC: <2 U/ML (ref 0–3)
TTG IGG SER-ACNC: 3 U/ML (ref 0–5)

## 2023-12-22 ENCOUNTER — TELEPHONE (OUTPATIENT)
Dept: OBGYN CLINIC | Facility: CLINIC | Age: 33
End: 2023-12-22

## 2023-12-22 LAB
C TRACH DNA SPEC QL NAA+PROBE: NEGATIVE
N GONORRHOEA DNA SPEC QL NAA+PROBE: NEGATIVE

## 2023-12-22 NOTE — TELEPHONE ENCOUNTER
----- Message from TERESA Ahuja sent at 12/22/2023 12:52 PM EST -----  Please inform pt that her culture for chlamydia and gonorrhea were negative.  Thank You!

## 2023-12-28 ENCOUNTER — APPOINTMENT (OUTPATIENT)
Dept: LAB | Facility: HOSPITAL | Age: 33
End: 2023-12-28

## 2023-12-28 DIAGNOSIS — Z11.1 SCREENING EXAMINATION FOR PULMONARY TUBERCULOSIS: ICD-10-CM

## 2023-12-28 PROCEDURE — 86480 TB TEST CELL IMMUN MEASURE: CPT

## 2023-12-28 PROCEDURE — 36415 COLL VENOUS BLD VENIPUNCTURE: CPT

## 2023-12-29 LAB
GAMMA INTERFERON BACKGROUND BLD IA-ACNC: 0.04 IU/ML
M TB IFN-G BLD-IMP: NEGATIVE
M TB IFN-G CD4+ BCKGRND COR BLD-ACNC: 0.01 IU/ML
M TB IFN-G CD4+ BCKGRND COR BLD-ACNC: 0.01 IU/ML
MITOGEN IGNF BCKGRD COR BLD-ACNC: 9.96 IU/ML

## 2024-01-09 ENCOUNTER — APPOINTMENT (OUTPATIENT)
Dept: LAB | Facility: HOSPITAL | Age: 34
End: 2024-01-09

## 2024-01-09 DIAGNOSIS — Z01.84 IMMUNITY STATUS TESTING: ICD-10-CM

## 2024-01-09 DIAGNOSIS — Z11.1 SCREENING EXAMINATION FOR PULMONARY TUBERCULOSIS: ICD-10-CM

## 2024-01-09 LAB
MEV IGG SER QL IA: NORMAL
MUV IGG SER QL IA: NORMAL
RUBV IGG SERPL IA-ACNC: 71.8 IU/ML
VZV IGG SER QL IA: NORMAL

## 2024-01-09 PROCEDURE — 86480 TB TEST CELL IMMUN MEASURE: CPT

## 2024-01-09 PROCEDURE — 86765 RUBEOLA ANTIBODY: CPT

## 2024-01-09 PROCEDURE — 86787 VARICELLA-ZOSTER ANTIBODY: CPT

## 2024-01-09 PROCEDURE — 86762 RUBELLA ANTIBODY: CPT

## 2024-01-09 PROCEDURE — 86735 MUMPS ANTIBODY: CPT

## 2024-01-09 PROCEDURE — 36415 COLL VENOUS BLD VENIPUNCTURE: CPT

## 2024-01-10 ENCOUNTER — OFFICE VISIT (OUTPATIENT)
Dept: OBGYN CLINIC | Facility: CLINIC | Age: 34
End: 2024-01-10
Payer: COMMERCIAL

## 2024-01-10 VITALS
BODY MASS INDEX: 23.92 KG/M2 | SYSTOLIC BLOOD PRESSURE: 108 MMHG | WEIGHT: 130 LBS | DIASTOLIC BLOOD PRESSURE: 75 MMHG | HEIGHT: 62 IN | HEART RATE: 85 BPM

## 2024-01-10 DIAGNOSIS — G56.01 CARPAL TUNNEL SYNDROME ON RIGHT: ICD-10-CM

## 2024-01-10 DIAGNOSIS — G56.21 CUBITAL TUNNEL SYNDROME ON RIGHT: Primary | ICD-10-CM

## 2024-01-10 DIAGNOSIS — G56.22 CUBITAL TUNNEL SYNDROME ON LEFT: ICD-10-CM

## 2024-01-10 DIAGNOSIS — G56.02 CARPAL TUNNEL SYNDROME ON LEFT: ICD-10-CM

## 2024-01-10 LAB
GAMMA INTERFERON BACKGROUND BLD IA-ACNC: 0.02 IU/ML
M TB IFN-G BLD-IMP: NEGATIVE
M TB IFN-G CD4+ BCKGRND COR BLD-ACNC: 0.04 IU/ML
M TB IFN-G CD4+ BCKGRND COR BLD-ACNC: 0.08 IU/ML
MITOGEN IGNF BCKGRD COR BLD-ACNC: 9.98 IU/ML

## 2024-01-10 PROCEDURE — 99215 OFFICE O/P EST HI 40 MIN: CPT | Performed by: SURGERY

## 2024-01-10 NOTE — PROGRESS NOTES
ASSESSMENT/PLAN:      34 year old female who has bilateral carpal tunnel and cubital tunnel syndromes noted on US. Her right is more symptomatic than the left. She has tried conservative treatment since June 2023 with OT, bracing at the wrists and elbows. She feels that her symptoms are gradually getting worse and it's her dominate hand.   The anatomy and physiology of carpal tunnel syndrome was discussed with the patient today.  Increase pressure localized under the transverse carpal ligament can cause pain, numbness, tingling, or dysesthesias within the median nerve distribution as well as feelings of fatigue, clumsiness, or awkwardness.  These symptoms typically occur at night and worse in the morning upon waking.  Eventually, untreated carpal tunnel syndrome can result in weakness and permanent loss of muscle within the thenar compartment of the hand.  Treatment options were discussed with the patient.  Conservative treatment includes nocturnal resting splints to keep the nerve in a neutral position, ergonomic changes within the work or home environment, activity modification, and tendon gliding exercises.  Steroid injections within the carpal canal can help a majority of patients, however this is often self-limited in a majority of patients.  Surgical intervention to divide the transverse carpal ligament typically results in a long-lasting relief of the patient's complaints, with the recurrence rate of less than 1%.  The anatomy and physiology of cubital tunnel syndrome were discussed with the patient today in the office.  Typically, increased elbow flexion activities decrease blood flow within the intraneural spaces, resulting in a feeling of numbness, tingling, weakness, or clumsiness within the hand and fingers.  Occasionally, anatomic structures such as medial elbow osteophytes, the medial head of the triceps, were subluxing ulnar nerve may result in increased pressure or aggravation at the cubital tunnel.   Typical signs and symptoms usually include numbness and tingling within the ring and small finger, weakness with , and weakness with pinch.  Conservative treatment and includes nocturnal bracing to keep the elbow in a semi-extended position, activity modification, therapy, and avoiding excessive elbow flexion activities.  A majority of patients typically respond to conservative treatment over a period of approximately 3-6 months.  EMG/NCV testing of the ulnar nerve at the elbow is not as reliable as carpal tunnel syndrome.  Surgical intervention in the form of in situ release of the ulnar nerve at the elbow or ulnar nerve transposition may be required in up to 20% of patients.  Her hand will be wrapped up for 5 days post op where she can't get it wet or lift anything heavier than a coffee cup. After 5 days the wrap can come off and she can wash with warm water, soap and pat dry but continue to not submerge the area until sutures come out between 10-14 days post op. Patient shows understanding and wishes to proceed with a right carpal and cubital tunnel release with possible transposition with adipose flap.     The patient verbalized understanding of exam findings and treatment plan. We engaged in the shared decision-making process and treatment options were discussed at length with the patient. Surgical and conservative management discussed today along with risks and benefits.    Diagnoses and all orders for this visit:    Cubital tunnel syndrome on right  -     Case request operating room: RELEASE CUBITAL TUNNEL WITH POSSIBEL TRANSPOSITION AND ADIPOSE  FLAP, RELEASE CARPAL TUNNEL; Standing    Carpal tunnel syndrome on right    Carpal tunnel syndrome on left  -     Case request operating room: RELEASE CUBITAL TUNNEL WITH POSSIBEL TRANSPOSITION AND ADIPOSE  FLAP, RELEASE CARPAL TUNNEL; Standing    Cubital tunnel syndrome on left    Other orders  -     Nursing Communication Warmimg Interventions Implemented;  Standing  -     Nursing Communication CHG bath, have staff wash entire body (neck down) per pre-op bathing protocol. Routine, evening prior to, and day of surgery.; Standing  -     Nursing Communication Swab both nares with Povidone-Iodine solution, EXCLUDE if patient has shellfish/Iodine allergy, and replace with nasal alcohol swabstick. Routine, day of surgery, on call to OR; Standing  -     Void on call to OR; Standing  -     Insert peripheral IV; Standing  -     Diet NPO; Sips with meds; Standing  -     Height and weight upon arrival; Standing  -     Apply Sequential Compression Device; Standing          Cubital Tunnel Release:   The anatomy and physiology of cubital tunnel syndrome were discussed with the patient today in the office.  Typically, increased elbow flexion activities decrease blood flow within the intraneural spaces, resulting in a feeling of numbness, tingling, weakness, or clumsiness within the hand and fingers.  Occasionally, anatomic structures such as medial elbow osteophytes, the medial head of the triceps, were subluxing ulnar nerve may result in increased pressure or aggravation at the cubital tunnel.  Typical signs and symptoms usually include numbness and tingling within the ring and small finger, weakness with , and weakness with pinch.  Conservative treatment and includes nocturnal bracing to keep the elbow in a semi-extended position, activity modification, therapy, and avoiding excessive elbow flexion activities.  A majority of patients typically respond to conservative treatment over a period of approximately 3-6 months.  Vitamin B6 one tablet daily over the counter may helpful to reduce symptoms.  EMG/NCV testing of the ulnar nerve at the elbow is not as reliable as carpal tunnel syndrome.  Surgical intervention in the form of in situ release of the ulnar nerve at the elbow or ulnar nerve transposition may be required in up to 20% of patients. The patient has elected to undergo  cubital tunnel release.  The possibility of converting to a subcutaneous or submuscular ulnar nerve transposition depending on the nerve stability was discussed with the patient.  Typically, in the postoperative period, light activities are allowed immediately, driving is allowed when narcotic medications have stopped, and the incision may get wet after 5 days.  Heavy activities will be allowed after follow up appointment in 1-2 weeks.  While the pain within the ring and small finger of the hands generally improves rapidly, the numbness and tingling, as well as the strength, will slowly improve over a period of weeks to months.  Total recovery can take up to 18 months from the time of surgery.  Numbness and tingling near the incision, or near the medial aspect of the forearm was discussed with the patient.  The patient has an understanding of the above mentioned discussion. The risks and benefits of the procedure were explained to the patient, which include, but are not limited to: Bleeding, infection, recurrence, pain, scar, damage to tendons, damage to nerves, and damage to blood vessels, failure to give desired results and complications related to anesthesia.  These risks, along with alternative conservative treatment options, and postoperative protocols were voiced back and understood by the patient.  All questions were answered to the patient's satisfaction.  The patient agrees to comply with a standard postoperative protocol, and is willing to proceed.  Education was provided via written and auditory forms.  There were no barriers to learning. Written handouts regarding wound care, incision and scar care, and general preoperative information was provided to the patient.  Prior to surgery, the patient may be requested to stop all anti-inflammatory medications.  Prophylactic aspirin, Plavix, and Coumadin may be allowed to be continued.  Medications including vitamin E., ginkgo, and fish oil are requested to be  stopped approximately one week prior to surgery.  Hypertensive medications and beta blockers, if taken, should be continued. Vitamin B6 one tablet daily over the counter may helpful to reduce symptoms.   and Open Carpal Tunnel Release:   The anatomy and physiology of carpal tunnel syndrome was discussed with the patient today.  Increase pressure localized under the transverse carpal ligament can cause pain, numbness, tingling, or dysesthesias within the median nerve distribution as well as feelings of fatigue, clumsiness, or awkwardness.  These symptoms typically occur at night and worse in the morning upon waking.  Eventually, untreated carpal tunnel syndrome can result in weakness and permanent loss of muscle within the thenar compartment of the hand.  Treatment options were discussed with the patient.  Conservative treatment includes nocturnal resting splints to keep the nerve in a neutral position, ergonomic changes within the work or home environment, activity modification, and tendon gliding exercises.  Vitamin B6 one tablet daily over the counter may helpful to reduce symptoms.  Steroid injections within the carpal canal can help a majority of patients, however this is often self-limited in a majority of patients.  Surgical intervention to divide the transverse carpal ligament typically results in a long-lasting relief of the patient's complaints, with the recurrence rate of less than 1%. The patient has elected to undergo an open carpal tunnel release.  The palmar incision technique was discussed in the office with the patient today.   In the postoperative period, light activities are allowed immediately, driving is allowed when narcotic medication has stopped, and the bandages may be removed and incision may get wet after 2 days.  Heavy activities (lifting more than approximately 10 pounds) will be allowed after follow up appointment in 1-2 weeks.  While night symptoms (waking from sleep, pain, and  discomfort in the hands) generally improves rapidly, the numbness and tingling as well as the strength will slowly improve over weeks to months depending on the chronicity and severity of the carpal tunnel syndrome.  Pillar pain and scar discomfort were discussed with the patient which are self-limiting conditions.  The risks of bleeding and infection from the surgery are less than 1%.  Risk of recurrence is approximately 0.5%.  The risks of nerve injury or nerve damage or damage to the blood vessels is approximately 1 in 1200. The patient has an understanding of the above mentioned discussion.The risks and benefits of the procedure were explained to the patient, which include, but are not limited to: Bleeding, infection, recurrence, pain, scar, damage to tendons, damage to nerves, and damage to blood vessels, failure to give desired results and complications related to anesthesia.  These risks, along with alternative conservative treatment options, and postoperative protocols were voiced back and understood by the patient.  All questions were answered to the patient's satisfaction.  The patient agrees to comply with a standard postoperative protocol, and is willing to proceed.  Education was provided via written and auditory forms.  There were no barriers to learning. Written handouts regarding wound care, incision and scar care, and general preoperative information was provided to the patient.  Prior to surgery, the patient may be requested to stop all anti-inflammatory medications.  Prophylactic aspirin, Plavix, and Coumadin may be allowed to be continued.  Medications including vitamin E., ginkgo, and fish oil are requested to be stopped approximately one week prior to surgery.  Hypertensive medications and beta blockers, if taken, s    Follow Up:  Return for Follow up post- op.      To Do Next Visit:  Re-evaluation of current  issue    ____________________________________________________________________________________________________________________________________________      CHIEF COMPLAINT:  Chief Complaint   Patient presents with    Follow-up     Patient is here to go over ultrasounds today        SUBJECTIVE:  Charline Osborne is a 34 y.o. year old RHD female who presents today to discuss ultrasounds of bilateral elbows and wrists to further evaluate for cubital and carpal tunnel syndromes.  Patient continues to get significant pain and numbness and tingling in the right more than left.  Patient has tried wearing a brace at night for the wrist and the elbow.  She has been in occupational therapy.  She has tried ibuprofen for the pain which helps but does not help with the tingling.  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       I have personally reviewed all the relevant PMH, PSH, SH, FH, Medications and allergies.     PAST MEDICAL HISTORY:  Past Medical History:   Diagnosis Date    Allergic     seasonal-dust, pollen, weeds, grass    Anxiety     History of palpitations     with anxiety    Varicella     Wears contact lenses     Wears glasses        PAST SURGICAL HISTORY:  Past Surgical History:   Procedure Laterality Date    NE LAPAROSCOPY W/RMVL ADNEXAL STRUCTURES Bilateral 5/6/2022    Procedure: SALPINGECTOMY, LAPAROSCOPIC;  Surgeon: Kade Miranda MD;  Location: [facility];  Service: Gynecology    RHINOPLASTY      VAGINAL DELIVERY         FAMILY HISTORY:  Family History   Problem Relation Age of Onset    Diabetes Mother     Hypertension Mother     Seizures Mother     Depression Mother     Colon cancer Father     Heart disease Father     Hypertension Father     No Known Problems Sister     Depression Sister     Depression Brother     Depression Son     ADD / ADHD Son     No Known Problems Son     Diabetes  Maternal Grandmother     Diabetes Paternal Grandmother     Breast cancer Neg Hx     Ovarian cancer Neg Hx        SOCIAL HISTORY:  Social History     Tobacco Use    Smoking status: Never    Smokeless tobacco: Never   Vaping Use    Vaping status: Never Used   Substance Use Topics    Alcohol use: Yes     Comment: socially    Drug use: No       MEDICATIONS:    Current Outpatient Medications:     fluticasone (FLONASE) 50 mcg/act nasal spray, 1 spray into each nostril daily, Disp: 16 g, Rfl: 0    Multiple Vitamin (MULTIVITAMIN) capsule, Take 1 capsule by mouth daily, Disp: , Rfl:     psyllium (METAMUCIL) 0.52 g capsule, Take 1 capsule (0.52 g total) by mouth daily, Disp: 30 capsule, Rfl: 1    sertraline (Zoloft) 25 mg tablet, Take 1 tablet (25 mg total) by mouth daily (Patient not taking: Reported on 10/31/2023), Disp: 30 tablet, Rfl: 0    ALLERGIES:  Allergies   Allergen Reactions    Dust Mite Extract     Pollen Extract     Tree Extract        REVIEW OF SYSTEMS:  Review of Systems   Constitutional:  Negative for chills, fever and unexpected weight change.   HENT:  Negative for hearing loss, nosebleeds and sore throat.    Eyes:  Negative for pain, redness and visual disturbance.   Respiratory:  Negative for cough, shortness of breath and wheezing.    Cardiovascular:  Negative for chest pain, palpitations and leg swelling.   Gastrointestinal:  Negative for abdominal pain and nausea.   Genitourinary:  Negative for dyspareunia, dysuria and frequency.   Skin:  Negative for rash and wound.   Neurological:  Positive for weakness and numbness. Negative for dizziness and headaches.   Psychiatric/Behavioral:  Negative for decreased concentration and suicidal ideas. The patient is not nervous/anxious.        VITALS:  Vitals:    01/10/24 0930   BP: 108/75   Pulse: 85       LABS:  HgA1c:   Lab Results   Component Value Date    HGBA1C 5.3 05/02/2022     BMP:   Lab Results   Component Value Date    CALCIUM 9.0 10/31/2023    K 3.7  10/31/2023    CO2 30 10/31/2023     10/31/2023    BUN 11 10/31/2023    CREATININE 0.51 (L) 10/31/2023       _____________________________________________________  PHYSICAL EXAMINATION:  General: well developed and well nourished, alert, oriented times 3, and appears comfortable  Psychiatric: Normal  HEENT: Normocephalic, Atraumatic Trachea Midline, No torticollis  Pulmonary: No audible wheezing or respiratory distress   Cardiovascular: No pitting edema, 2+ radial pulse   Abdominal/GI: abdomen non tender, non distended   Skin: No masses, erythema, lacerations, fluctation, ulcerations  Neurovascular: Sensation Intact to the Radial Nerve, Decreased Sensation to  the Median Nerve, Decreased Sensation to  the Ulnar Nerve, Motor Intact to the Median, Ulnar, Radial Nerve, and Pulses Intact  Musculoskeletal: Normal, except as noted in detailed exam and in HPI.      MUSCULOSKELETAL EXAMINATION:  Right Carpal Tunnel Exam:    Negative thenar atrophy. Positive phalen's test. Positive carpal tunnel compression. Positive tinels over median nerve at the wrist.  Opposition strength 5/5.  Abduction strength 5/5.      Right Ulnar Nerve Exam:    Negative intrinsic atrophy.  Negative  deformity at the elbow.   Full range of motion with flexion and extension of the elbow.   Positive ulnar nerve compression test at the elbow. Positive tinels over the ulnar nerve at the elbow.  Negative cross finger test in the index and long fingers.   Intrinsic strength 5/5 .      Left Carpal Tunnel Exam:    Negative thenar atrophy. Negative phalen's test. Positive carpal tunnel compression. Negative tinels over median nerve at the wrist.  Opposition strength 5/5.  Abduction strength 5/5.      Left Ulnar Nerve Exam:    Negative intrinsic atrophy.  Negative  deformity at the elbow.   Full range of motion with flexion and extension of the elbow.   Negative ulnar nerve compression test at the elbow. Positive tinels over the ulnar nerve at the elbow.   Negative cross finger test in the index and long fingers.   Intrinsic strength 5/5 .    ___________________________________________________  STUDIES REVIEWED:  I have personally reviewed US bilateral elbows which demonstrate IMPRESSION:     RIGHT CUBITAL TUNNEL: Enlarged ulnar nerve near/within cubital tunnel suspicious for ulnar neuropathy/cubital tunnel syndrome.  Maximum ulnar nerve cross sectional area within or near the cubital tunnel: 10.1 sq mm.      LEFT CUBITAL TUNNEL: Enlarged ulnar nerve near/within cubital tunnel, suggestive of ulnar neuropathy/cubital tunnel syndrome.   Maximum ulnar nerve cross sectional area within or near the cubital tunnel: 9 sq mm     US of bilateral wrists: IMPRESSION:     RIGHT SIDE: Findings suspicious for carpal tunnel syndrome, with median nerve WF ratio > 1.4.  Maximum median nerve cross sectional area within carpal tunnel (CSAc): 13.1 sq mm.    LEFT SIDE: Findings suspicious for carpal tunnel syndrome, with median nerve WF ratio > 1.4.  Maximum median nerve cross sectional area within carpal tunnel (CSAc): 12.4 sq mm.     PROCEDURES PERFORMED:  Procedures  No Procedures performed today    _____________________________________________________      Scribe Attestation      I,:  Jane Abraham am acting as a scribe while in the presence of the attending physician.:       I,:  Azalea Edward MD personally performed the services described in this documentation    as scribed in my presence.:

## 2024-01-10 NOTE — PATIENT INSTRUCTIONS
What to Expect After Hand Surgery  Below are typical postoperative expectations and recommendations for our patients having hand/elbow surgery. Please understand, however, that every case and every patient are different so these recommendations are subject to change on an individual basis. Please be flexible if you are told something different on the day of surgery and understand that we do so with your best interest at heart.     Postoperative care:   Elevate your hand above your elbow for 24-48 hours after surgery to help with swelling.   Place your hand and arm over your head with motion at your shoulder three times a day.   Do NOT apply any cream/ointment/oil to your incisions including antibiotic ointment, peroxide, etc.   Do NOT soak your hands in standing water (dishwater, tubs, Jacuzzi's, pools, etc.) until given permission (typically 2-3 weeks after injury)   What to watch out for:   Increased numbness or tingling of your hand or fingers that is not relieved with elevation.   Increasing pain that is not controlled with medication.   Difficulty chewing, breathing, swallowing.   Chest pains or shortness of breath.   Fever over 101.4 degrees.   Bandages:   Please keep bandages clean and dry, you will need to cover bandages with plastic bag or cast bag when showering. Any sutures will be removed in the office, please do not try and remove these on your own. Any steri-strips will fall off on their own or we will remove them in the office as well.   For smaller procedures (carpal tunnel, trigger fingers, deQuervain's release, etc.) you will be able to remove the bandages 5-7 days from surgery. Once the bandages are removed you will be able to wash the hand and take short showers. Avoid soaking the wounds in any standing water (no dirty dishes, no pools/baths/hot tubs/ocean water, etc) until you are seen at your follow up visit.   For fractures, tendon repairs, and other larger procedures we will typically have  you keep the bandages on until we see you back in the office at your follow up visit. In some cases we may have you meet with occupational therapy before we see you back. If this is the case the therapist will remove your bandages for you and the above wound care instructions will apply.   We emphasize that you do not put anything on the surgical wounds including neosporin, lotions, oils, peroxide, etc. These can delay wound healing and open you up to infections. Bandaids are okay in some cases, but should only be in place for a short time as they can hold moisture in and break down the wound.   Motion and activity:   We encourage you to move any non-splinted fingers into a full tight fist as soon as possible after surgery unless otherwise specified by the surgical team. Hands get very stiff very quickly, so keep them moving!   Weight bearing status will depend on your surgery and will be specified in your postoperative instructions. Frequently we advise no lifting over 1-2 pounds with the operative hand, this allows you to perform activities of daily living (eating, brushing teeth/hair, etc), but also protects you from damaging the surgical site. In some cases we advise that you do not lift anything with the operative hand, but this will be specified in your instructions day of surgery.   Depending on your job and what surgery you had done some patients can return to work shortly after surgery. Typically if your job involves heavy lifting, griping, or manual labor we advise that you do not work until we see you back for your first follow up visit. These jobs carry a high risk of surgical site infections and wound healing complications  Sling:   Use of a sling will be specified in your postoperative instructions.   If you have a block done by anesthesia before surgery you will have limited use of the operative arm for around 24-48 hrs after and may require a sling to hold your arm up during that time. Once the block  wears off you may discontinue use of the sling.   Some surgeries do not require a block or a sling at all, this will be specified on day of surgery and in your postoperative instructions.   Pain medication:   We will prescribe you a one-time script of narcotic pain medications for postoperative pain, the amount will depend on what surgery was done. In addition to this we typically recommend Tylenol and Ibuprofen/naproxen for pain control, these medications work best when alternated every 3-4 hours.   Medications will vary between patients, so if you have any allergies or concerns please let us know and we can adjust our recommendations as needed.   Please let us know if you already take narcotic pain medicine regularly or if you are already established with pain management. Often times in these cases you may have signed a pain agreement with the prescribing provider and we will need to discuss with them regarding your postoperative care.   Follow-up:   Most follow up appointments are made when you schedule surgery, if you do not have a follow up by the time you schedule surgery please call the office to make an appointment. Typical follow up is 10-14 days from surgery unless otherwise specified.

## 2024-01-10 NOTE — H&P
ASSESSMENT/PLAN:      34 year old female who has bilateral carpal tunnel and cubital tunnel syndromes noted on US. Her right is more symptomatic than the left. She has tried conservative treatment since June 2023 with OT, bracing at the wrists and elbows. She feels that her symptoms are gradually getting worse and it's her dominate hand.   The anatomy and physiology of carpal tunnel syndrome was discussed with the patient today.  Increase pressure localized under the transverse carpal ligament can cause pain, numbness, tingling, or dysesthesias within the median nerve distribution as well as feelings of fatigue, clumsiness, or awkwardness.  These symptoms typically occur at night and worse in the morning upon waking.  Eventually, untreated carpal tunnel syndrome can result in weakness and permanent loss of muscle within the thenar compartment of the hand.  Treatment options were discussed with the patient.  Conservative treatment includes nocturnal resting splints to keep the nerve in a neutral position, ergonomic changes within the work or home environment, activity modification, and tendon gliding exercises.  Steroid injections within the carpal canal can help a majority of patients, however this is often self-limited in a majority of patients.  Surgical intervention to divide the transverse carpal ligament typically results in a long-lasting relief of the patient's complaints, with the recurrence rate of less than 1%.  The anatomy and physiology of cubital tunnel syndrome were discussed with the patient today in the office.  Typically, increased elbow flexion activities decrease blood flow within the intraneural spaces, resulting in a feeling of numbness, tingling, weakness, or clumsiness within the hand and fingers.  Occasionally, anatomic structures such as medial elbow osteophytes, the medial head of the triceps, were subluxing ulnar nerve may result in increased pressure or aggravation at the cubital tunnel.   Typical signs and symptoms usually include numbness and tingling within the ring and small finger, weakness with , and weakness with pinch.  Conservative treatment and includes nocturnal bracing to keep the elbow in a semi-extended position, activity modification, therapy, and avoiding excessive elbow flexion activities.  A majority of patients typically respond to conservative treatment over a period of approximately 3-6 months.  EMG/NCV testing of the ulnar nerve at the elbow is not as reliable as carpal tunnel syndrome.  Surgical intervention in the form of in situ release of the ulnar nerve at the elbow or ulnar nerve transposition may be required in up to 20% of patients.  Her hand will be wrapped up for 5 days post op where she can't get it wet or lift anything heavier than a coffee cup. After 5 days the wrap can come off and she can wash with warm water, soap and pat dry but continue to not submerge the area until sutures come out between 10-14 days post op. Patient shows understanding and wishes to proceed with a right carpal and cubital tunnel release with possible transposition with adipose flap.     The patient verbalized understanding of exam findings and treatment plan. We engaged in the shared decision-making process and treatment options were discussed at length with the patient. Surgical and conservative management discussed today along with risks and benefits.    Diagnoses and all orders for this visit:    Cubital tunnel syndrome on right  -     Case request operating room: RELEASE CUBITAL TUNNEL WITH POSSIBEL TRANSPOSITION AND ADIPOSE  FLAP, RELEASE CARPAL TUNNEL; Standing    Carpal tunnel syndrome on right    Carpal tunnel syndrome on left  -     Case request operating room: RELEASE CUBITAL TUNNEL WITH POSSIBEL TRANSPOSITION AND ADIPOSE  FLAP, RELEASE CARPAL TUNNEL; Standing    Cubital tunnel syndrome on left    Other orders  -     Nursing Communication Warmimg Interventions Implemented;  Standing  -     Nursing Communication CHG bath, have staff wash entire body (neck down) per pre-op bathing protocol. Routine, evening prior to, and day of surgery.; Standing  -     Nursing Communication Swab both nares with Povidone-Iodine solution, EXCLUDE if patient has shellfish/Iodine allergy, and replace with nasal alcohol swabstick. Routine, day of surgery, on call to OR; Standing  -     Void on call to OR; Standing  -     Insert peripheral IV; Standing  -     Diet NPO; Sips with meds; Standing  -     Height and weight upon arrival; Standing  -     Apply Sequential Compression Device; Standing          Cubital Tunnel Release:   The anatomy and physiology of cubital tunnel syndrome were discussed with the patient today in the office.  Typically, increased elbow flexion activities decrease blood flow within the intraneural spaces, resulting in a feeling of numbness, tingling, weakness, or clumsiness within the hand and fingers.  Occasionally, anatomic structures such as medial elbow osteophytes, the medial head of the triceps, were subluxing ulnar nerve may result in increased pressure or aggravation at the cubital tunnel.  Typical signs and symptoms usually include numbness and tingling within the ring and small finger, weakness with , and weakness with pinch.  Conservative treatment and includes nocturnal bracing to keep the elbow in a semi-extended position, activity modification, therapy, and avoiding excessive elbow flexion activities.  A majority of patients typically respond to conservative treatment over a period of approximately 3-6 months.  Vitamin B6 one tablet daily over the counter may helpful to reduce symptoms.  EMG/NCV testing of the ulnar nerve at the elbow is not as reliable as carpal tunnel syndrome.  Surgical intervention in the form of in situ release of the ulnar nerve at the elbow or ulnar nerve transposition may be required in up to 20% of patients. The patient has elected to undergo  cubital tunnel release.  The possibility of converting to a subcutaneous or submuscular ulnar nerve transposition depending on the nerve stability was discussed with the patient.  Typically, in the postoperative period, light activities are allowed immediately, driving is allowed when narcotic medications have stopped, and the incision may get wet after 5 days.  Heavy activities will be allowed after follow up appointment in 1-2 weeks.  While the pain within the ring and small finger of the hands generally improves rapidly, the numbness and tingling, as well as the strength, will slowly improve over a period of weeks to months.  Total recovery can take up to 18 months from the time of surgery.  Numbness and tingling near the incision, or near the medial aspect of the forearm was discussed with the patient.  The patient has an understanding of the above mentioned discussion. The risks and benefits of the procedure were explained to the patient, which include, but are not limited to: Bleeding, infection, recurrence, pain, scar, damage to tendons, damage to nerves, and damage to blood vessels, failure to give desired results and complications related to anesthesia.  These risks, along with alternative conservative treatment options, and postoperative protocols were voiced back and understood by the patient.  All questions were answered to the patient's satisfaction.  The patient agrees to comply with a standard postoperative protocol, and is willing to proceed.  Education was provided via written and auditory forms.  There were no barriers to learning. Written handouts regarding wound care, incision and scar care, and general preoperative information was provided to the patient.  Prior to surgery, the patient may be requested to stop all anti-inflammatory medications.  Prophylactic aspirin, Plavix, and Coumadin may be allowed to be continued.  Medications including vitamin E., ginkgo, and fish oil are requested to be  stopped approximately one week prior to surgery.  Hypertensive medications and beta blockers, if taken, should be continued. Vitamin B6 one tablet daily over the counter may helpful to reduce symptoms.   and Open Carpal Tunnel Release:   The anatomy and physiology of carpal tunnel syndrome was discussed with the patient today.  Increase pressure localized under the transverse carpal ligament can cause pain, numbness, tingling, or dysesthesias within the median nerve distribution as well as feelings of fatigue, clumsiness, or awkwardness.  These symptoms typically occur at night and worse in the morning upon waking.  Eventually, untreated carpal tunnel syndrome can result in weakness and permanent loss of muscle within the thenar compartment of the hand.  Treatment options were discussed with the patient.  Conservative treatment includes nocturnal resting splints to keep the nerve in a neutral position, ergonomic changes within the work or home environment, activity modification, and tendon gliding exercises.  Vitamin B6 one tablet daily over the counter may helpful to reduce symptoms.  Steroid injections within the carpal canal can help a majority of patients, however this is often self-limited in a majority of patients.  Surgical intervention to divide the transverse carpal ligament typically results in a long-lasting relief of the patient's complaints, with the recurrence rate of less than 1%. The patient has elected to undergo an open carpal tunnel release.  The palmar incision technique was discussed in the office with the patient today.   In the postoperative period, light activities are allowed immediately, driving is allowed when narcotic medication has stopped, and the bandages may be removed and incision may get wet after 2 days.  Heavy activities (lifting more than approximately 10 pounds) will be allowed after follow up appointment in 1-2 weeks.  While night symptoms (waking from sleep, pain, and  discomfort in the hands) generally improves rapidly, the numbness and tingling as well as the strength will slowly improve over weeks to months depending on the chronicity and severity of the carpal tunnel syndrome.  Pillar pain and scar discomfort were discussed with the patient which are self-limiting conditions.  The risks of bleeding and infection from the surgery are less than 1%.  Risk of recurrence is approximately 0.5%.  The risks of nerve injury or nerve damage or damage to the blood vessels is approximately 1 in 1200. The patient has an understanding of the above mentioned discussion.The risks and benefits of the procedure were explained to the patient, which include, but are not limited to: Bleeding, infection, recurrence, pain, scar, damage to tendons, damage to nerves, and damage to blood vessels, failure to give desired results and complications related to anesthesia.  These risks, along with alternative conservative treatment options, and postoperative protocols were voiced back and understood by the patient.  All questions were answered to the patient's satisfaction.  The patient agrees to comply with a standard postoperative protocol, and is willing to proceed.  Education was provided via written and auditory forms.  There were no barriers to learning. Written handouts regarding wound care, incision and scar care, and general preoperative information was provided to the patient.  Prior to surgery, the patient may be requested to stop all anti-inflammatory medications.  Prophylactic aspirin, Plavix, and Coumadin may be allowed to be continued.  Medications including vitamin E., ginkgo, and fish oil are requested to be stopped approximately one week prior to surgery.  Hypertensive medications and beta blockers, if taken, s    Follow Up:  Return for Follow up post- op.      To Do Next Visit:  Re-evaluation of current  issue    ____________________________________________________________________________________________________________________________________________      CHIEF COMPLAINT:  Chief Complaint   Patient presents with    Follow-up     Patient is here to go over ultrasounds today        SUBJECTIVE:  Charline Osborne is a 34 y.o. year old RHD female who presents today to discuss ultrasounds of bilateral elbows and wrists to further evaluate for cubital and carpal tunnel syndromes.  Patient continues to get significant pain and numbness and tingling in the right more than left.  Patient has tried wearing a brace at night for the wrist and the elbow.  She has been in occupational therapy.  She has tried ibuprofen for the pain which helps but does not help with the tingling.  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       I have personally reviewed all the relevant PMH, PSH, SH, FH, Medications and allergies.     PAST MEDICAL HISTORY:  Past Medical History:   Diagnosis Date    Allergic     seasonal-dust, pollen, weeds, grass    Anxiety     History of palpitations     with anxiety    Varicella     Wears contact lenses     Wears glasses        PAST SURGICAL HISTORY:  Past Surgical History:   Procedure Laterality Date    SC LAPAROSCOPY W/RMVL ADNEXAL STRUCTURES Bilateral 5/6/2022    Procedure: SALPINGECTOMY, LAPAROSCOPIC;  Surgeon: Kade Miranda MD;  Location: [facility];  Service: Gynecology    RHINOPLASTY      VAGINAL DELIVERY         FAMILY HISTORY:  Family History   Problem Relation Age of Onset    Diabetes Mother     Hypertension Mother     Seizures Mother     Depression Mother     Colon cancer Father     Heart disease Father     Hypertension Father     No Known Problems Sister     Depression Sister     Depression Brother     Depression Son     ADD / ADHD Son     No Known Problems Son     Diabetes  Maternal Grandmother     Diabetes Paternal Grandmother     Breast cancer Neg Hx     Ovarian cancer Neg Hx        SOCIAL HISTORY:  Social History     Tobacco Use    Smoking status: Never    Smokeless tobacco: Never   Vaping Use    Vaping status: Never Used   Substance Use Topics    Alcohol use: Yes     Comment: socially    Drug use: No       MEDICATIONS:    Current Outpatient Medications:     fluticasone (FLONASE) 50 mcg/act nasal spray, 1 spray into each nostril daily, Disp: 16 g, Rfl: 0    Multiple Vitamin (MULTIVITAMIN) capsule, Take 1 capsule by mouth daily, Disp: , Rfl:     psyllium (METAMUCIL) 0.52 g capsule, Take 1 capsule (0.52 g total) by mouth daily, Disp: 30 capsule, Rfl: 1    sertraline (Zoloft) 25 mg tablet, Take 1 tablet (25 mg total) by mouth daily (Patient not taking: Reported on 10/31/2023), Disp: 30 tablet, Rfl: 0    ALLERGIES:  Allergies   Allergen Reactions    Dust Mite Extract     Pollen Extract     Tree Extract        REVIEW OF SYSTEMS:  Review of Systems   Constitutional:  Negative for chills, fever and unexpected weight change.   HENT:  Negative for hearing loss, nosebleeds and sore throat.    Eyes:  Negative for pain, redness and visual disturbance.   Respiratory:  Negative for cough, shortness of breath and wheezing.    Cardiovascular:  Negative for chest pain, palpitations and leg swelling.   Gastrointestinal:  Negative for abdominal pain and nausea.   Genitourinary:  Negative for dyspareunia, dysuria and frequency.   Skin:  Negative for rash and wound.   Neurological:  Positive for weakness and numbness. Negative for dizziness and headaches.   Psychiatric/Behavioral:  Negative for decreased concentration and suicidal ideas. The patient is not nervous/anxious.        VITALS:  Vitals:    01/10/24 0930   BP: 108/75   Pulse: 85       LABS:  HgA1c:   Lab Results   Component Value Date    HGBA1C 5.3 05/02/2022     BMP:   Lab Results   Component Value Date    CALCIUM 9.0 10/31/2023    K 3.7  10/31/2023    CO2 30 10/31/2023     10/31/2023    BUN 11 10/31/2023    CREATININE 0.51 (L) 10/31/2023       _____________________________________________________  PHYSICAL EXAMINATION:  General: well developed and well nourished, alert, oriented times 3, and appears comfortable  Psychiatric: Normal  HEENT: Normocephalic, Atraumatic Trachea Midline, No torticollis  Pulmonary: No audible wheezing or respiratory distress   Cardiovascular: No pitting edema, 2+ radial pulse   Abdominal/GI: abdomen non tender, non distended   Skin: No masses, erythema, lacerations, fluctation, ulcerations  Neurovascular: Sensation Intact to the Radial Nerve, Decreased Sensation to  the Median Nerve, Decreased Sensation to  the Ulnar Nerve, Motor Intact to the Median, Ulnar, Radial Nerve, and Pulses Intact  Musculoskeletal: Normal, except as noted in detailed exam and in HPI.      MUSCULOSKELETAL EXAMINATION:  Right Carpal Tunnel Exam:    Negative thenar atrophy. Positive phalen's test. Positive carpal tunnel compression. Positive tinels over median nerve at the wrist.  Opposition strength 5/5.  Abduction strength 5/5.      Right Ulnar Nerve Exam:    Negative intrinsic atrophy.  Negative  deformity at the elbow.   Full range of motion with flexion and extension of the elbow.   Positive ulnar nerve compression test at the elbow. Positive tinels over the ulnar nerve at the elbow.  Negative cross finger test in the index and long fingers.   Intrinsic strength 5/5 .      Left Carpal Tunnel Exam:    Negative thenar atrophy. Negative phalen's test. Positive carpal tunnel compression. Negative tinels over median nerve at the wrist.  Opposition strength 5/5.  Abduction strength 5/5.      Left Ulnar Nerve Exam:    Negative intrinsic atrophy.  Negative  deformity at the elbow.   Full range of motion with flexion and extension of the elbow.   Negative ulnar nerve compression test at the elbow. Positive tinels over the ulnar nerve at the elbow.   Negative cross finger test in the index and long fingers.   Intrinsic strength 5/5 .    ___________________________________________________  STUDIES REVIEWED:  I have personally reviewed US bilateral elbows which demonstrate IMPRESSION:     RIGHT CUBITAL TUNNEL: Enlarged ulnar nerve near/within cubital tunnel suspicious for ulnar neuropathy/cubital tunnel syndrome.  Maximum ulnar nerve cross sectional area within or near the cubital tunnel: 10.1 sq mm.      LEFT CUBITAL TUNNEL: Enlarged ulnar nerve near/within cubital tunnel, suggestive of ulnar neuropathy/cubital tunnel syndrome.   Maximum ulnar nerve cross sectional area within or near the cubital tunnel: 9 sq mm     US of bilateral wrists: IMPRESSION:     RIGHT SIDE: Findings suspicious for carpal tunnel syndrome, with median nerve WF ratio > 1.4.  Maximum median nerve cross sectional area within carpal tunnel (CSAc): 13.1 sq mm.    LEFT SIDE: Findings suspicious for carpal tunnel syndrome, with median nerve WF ratio > 1.4.  Maximum median nerve cross sectional area within carpal tunnel (CSAc): 12.4 sq mm.     PROCEDURES PERFORMED:  Procedures  No Procedures performed today    _____________________________________________________      Scribe Attestation      I,:  Jane Abraham am acting as a scribe while in the presence of the attending physician.:       I,:  Azalea Edward MD personally performed the services described in this documentation    as scribed in my presence.:

## 2024-02-16 ENCOUNTER — TELEPHONE (OUTPATIENT)
Dept: OBGYN CLINIC | Facility: CLINIC | Age: 34
End: 2024-02-16

## 2024-02-16 ENCOUNTER — ANESTHESIA (OUTPATIENT)
Dept: ANESTHESIOLOGY | Facility: HOSPITAL | Age: 34
End: 2024-02-16

## 2024-02-16 ENCOUNTER — ANESTHESIA EVENT (OUTPATIENT)
Dept: ANESTHESIOLOGY | Facility: HOSPITAL | Age: 34
End: 2024-02-16

## 2024-02-16 NOTE — TELEPHONE ENCOUNTER
Caller: Patient    Doctor: Wagner    Reason for call: Patient is calling to reschedule her surgey    Call back#: [phone]

## 2024-02-18 PROBLEM — Z11.3 SCREENING FOR STD (SEXUALLY TRANSMITTED DISEASE): Status: RESOLVED | Noted: 2023-12-20 | Resolved: 2024-02-18

## 2024-02-20 NOTE — TELEPHONE ENCOUNTER
Caller: Patient    Doctor: Wagner    Reason for call: Patient is calling in to speak with Lupe concerning rescheduling her surgey with Dr. Edward    Call back#: [phone]

## 2024-02-21 ENCOUNTER — TELEPHONE (OUTPATIENT)
Dept: OBGYN CLINIC | Facility: CLINIC | Age: 34
End: 2024-02-21

## 2024-02-21 NOTE — TELEPHONE ENCOUNTER
Caller: Patient    Call regarding :  Patient calling back Lupe.     Call was transferred to: Lupe Lopes

## 2024-03-18 ENCOUNTER — TELEPHONE (OUTPATIENT)
Age: 34
End: 2024-03-18

## 2024-03-18 NOTE — TELEPHONE ENCOUNTER
I can injected the wrist in the office as long as it is more than 6 weeks before surgery, the elbow would have to be done by our radiology department, again at least 6 weeks before surgery. Patient can call and schedule a follow up apt this week for the wrist if she would like and we can order elbow injection at that time

## 2024-03-18 NOTE — TELEPHONE ENCOUNTER
Called and spoke w/pt.  Her surgery is scheduled for 5/7/24 so she would need the injections this week.  Please see Dr Edward's msg to schedule pt for this week.  Unfortunately, I am unable to scheduled due to Dr Edward's scheduled being full.  Please scheduled pt accordingly.  Thank you.  Pt is willing to go to [facility] or [facility] offices.

## 2024-03-18 NOTE — TELEPHONE ENCOUNTER
Caller: patient     Doctor: Wagner     Reason for call: patient asking if she can have a cortisone injection/bilat arms prior to sx?  Currently using 600 mg Ibuprofen daily. Says she can't sleep, pain level is 8-10.    Call back#: [phone]

## 2024-03-21 ENCOUNTER — OFFICE VISIT (OUTPATIENT)
Dept: OBGYN CLINIC | Facility: CLINIC | Age: 34
End: 2024-03-21
Payer: COMMERCIAL

## 2024-03-21 VITALS — BODY MASS INDEX: 23.55 KG/M2 | WEIGHT: 128 LBS | HEIGHT: 62 IN

## 2024-03-21 DIAGNOSIS — G56.21 CUBITAL TUNNEL SYNDROME ON RIGHT: Primary | ICD-10-CM

## 2024-03-21 DIAGNOSIS — G56.01 CARPAL TUNNEL SYNDROME ON RIGHT: ICD-10-CM

## 2024-03-21 PROCEDURE — 99214 OFFICE O/P EST MOD 30 MIN: CPT | Performed by: SURGERY

## 2024-03-21 PROCEDURE — 20526 THER INJECTION CARP TUNNEL: CPT | Performed by: SURGERY

## 2024-03-21 RX ORDER — LIDOCAINE HYDROCHLORIDE 10 MG/ML
1 INJECTION, SOLUTION INFILTRATION; PERINEURAL
Status: COMPLETED | OUTPATIENT
Start: 2024-03-21 | End: 2024-03-21

## 2024-03-21 RX ORDER — MELOXICAM 15 MG/1
15 TABLET ORAL DAILY
Qty: 30 TABLET | Refills: 0 | Status: SHIPPED | OUTPATIENT
Start: 2024-03-21

## 2024-03-21 RX ORDER — DEXAMETHASONE SODIUM PHOSPHATE 10 MG/ML
40 INJECTION, SOLUTION INTRAMUSCULAR; INTRAVENOUS
Status: COMPLETED | OUTPATIENT
Start: 2024-03-21 | End: 2024-03-21

## 2024-03-21 RX ADMIN — LIDOCAINE HYDROCHLORIDE 1 ML: 10 INJECTION, SOLUTION INFILTRATION; PERINEURAL at 11:15

## 2024-03-21 RX ADMIN — DEXAMETHASONE SODIUM PHOSPHATE 40 MG: 10 INJECTION, SOLUTION INTRAMUSCULAR; INTRAVENOUS at 11:15

## 2024-03-21 NOTE — PATIENT INSTRUCTIONS
Make sure you discontinue any anti- inflammatory 1 weeks prior to date of surgery 5/7/2024    We will schedule an US guided cubital tunnel injection, it needs to be performed prior to 6 weeks.

## 2024-03-21 NOTE — PROGRESS NOTES
ASSESSMENT/PLAN:      34 year old female here for her symptomatic right carpal and cubital tunnel symptoms.  Patient has significant discomfort and difficulty sleeping secondary to nerve compression , which has been acutely worse in the last few weeks .  We discussed treatment options including corticosteroid injection and anti-inflammatory medication , both the risks and benefits of both ; she wishes to try a cortisone injection to the right carpal tunnel today in the office to help her symptoms. She tolerated it well. An US guided cortisone injection to the right cubital tunnel was ordered to try and get in next week since that would be 6 weeks prior to surgery.   A 1x/daily Mobic was sent to the patient's pharmacy, but MUST be stopped 1 week prior to her surgery date. This information was also provided in her AVS. We will follow up post op.     The patient verbalized understanding of exam findings and treatment plan. We engaged in the shared decision-making process and treatment options were discussed at length with the patient. Surgical and conservative management discussed today along with risks and benefits.    Diagnoses and all orders for this visit:    Cubital tunnel syndrome on right  -     US msk guidance; Future    Carpal tunnel syndrome on right  -     Hand/upper extremity injection: R carpal tunnel          Follow Up:  Return for Follow up post- op.      To Do Next Visit:  Re-evaluation of current issue    ____________________________________________________________________________________________________________________________________________      CHIEF COMPLAINT:  Chief Complaint   Patient presents with    Injections     Wants injections in rt elbow and wrist        SUBJECTIVE:  Charline Osborne is a 34 y.o. year old RHD female who presents today for a follow-up due to increase numbness and tingling into her fingers of her right hand.  Patient is currently scheduled for a right carpal and cubital  tunnel releases scheduled for 5/7/2024.  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  Patient reports that she has increased symptoms into the 2 ulnar digits.  She has tried taking ibuprofen up to 600 mg daily which does help but feels she needs to continue to take it for some relief.   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     I have personally reviewed all the relevant PMH, PSH, SH, FH, Medications and allergies.     PAST MEDICAL HISTORY:  Past Medical History:   Diagnosis Date    Allergic     seasonal-dust, pollen, weeds, grass    Anxiety     History of palpitations     with anxiety    Varicella     Wears contact lenses     Wears glasses        PAST SURGICAL HISTORY:  Past Surgical History:   Procedure Laterality Date    [place] LAPAROSCOPY W/RMVL ADNEXAL STRUCTURES Bilateral 5/6/2022    Procedure: SALPINGECTOMY, LAPAROSCOPIC;  Surgeon: Kade Miranda MD;  Location: [facility];  Service: Gynecology    RHINOPLASTY      VAGINAL DELIVERY         FAMILY HISTORY:  Family History   Problem Relation Age of Onset    Diabetes Mother     Hypertension Mother     Seizures Mother     Depression Mother     Colon cancer Father     Heart disease Father     Hypertension Father     No Known Problems Sister     Depression Sister     Depression Brother     Depression Son     ADD / ADHD Son     No Known Problems Son     Diabetes Maternal Grandmother     Diabetes Paternal Grandmother     Breast cancer Neg Hx     Ovarian cancer Neg Hx        SOCIAL HISTORY:  Social History     Tobacco Use    Smoking status: Never    Smokeless tobacco: Never   Vaping Use    Vaping status: Never Used   Substance Use Topics    Alcohol use: Yes     Comment: socially    Drug use: No       MEDICATIONS:    Current Outpatient Medications:     fluticasone (FLONASE) 50 mcg/act nasal spray, 1 spray into each nostril daily, Disp: 16 g, Rfl: 0    Multiple Vitamin (MULTIVITAMIN) capsule, Take 1 capsule  by mouth daily, Disp: , Rfl:     psyllium (METAMUCIL) 0.52 g capsule, Take 1 capsule (0.52 g total) by mouth daily, Disp: 30 capsule, Rfl: 1    ALLERGIES:  Allergies   Allergen Reactions    Dust Mite Extract     Pollen Extract     Tree Extract        REVIEW OF SYSTEMS:  Review of Systems   Constitutional:  Negative for chills, fever and unexpected weight change.   HENT:  Negative for hearing loss, nosebleeds and sore throat.    Eyes:  Negative for pain, redness and visual disturbance.   Respiratory:  Negative for cough, shortness of breath and wheezing.    Cardiovascular:  Negative for chest pain, palpitations and leg swelling.   Gastrointestinal:  Negative for abdominal pain and nausea.   Genitourinary:  Negative for dyspareunia, dysuria and frequency.   Skin:  Negative for rash and wound.   Neurological:  Positive for numbness. Negative for dizziness and headaches.   Psychiatric/Behavioral:  Negative for decreased concentration and suicidal ideas. The patient is not nervous/anxious.        VITALS:  There were no vitals filed for this visit.      _____________________________________________________  PHYSICAL EXAMINATION:  General: well developed and well nourished, alert, oriented times 3, and appears comfortable  Psychiatric: Normal  HEENT: Normocephalic, Atraumatic Trachea Midline, No torticollis  Pulmonary: No audible wheezing or respiratory distress   Cardiovascular: No pitting edema, 2+ radial pulse   Abdominal/GI: abdomen non tender, non distended   Skin: No masses, erythema, lacerations, fluctation, ulcerations  Neurovascular: Sensation Intact to the Radial Nerve, Decreased Sensation to  the Median Nerve, Decreased Sensation to  the Ulnar Nerve, Motor Intact to the Median, Ulnar, Radial Nerve, and Pulses Intact  Musculoskeletal: Normal, except as noted in detailed exam and in HPI.      MUSCULOSKELETAL EXAMINATION:    Right Carpal Tunnel Exam:     Negative thenar atrophy. Positive phalen's test. Positive  "carpal tunnel compression. Positive tinels over median nerve at the wrist.  Opposition strength 5/5.  Abduction strength 5/5.       Right Ulnar Nerve Exam:     Negative intrinsic atrophy.  Negative  deformity at the elbow.   Full range of motion with flexion and extension of the elbow.   Positive ulnar nerve compression test at the elbow. Positive tinels over the ulnar nerve at the elbow.  Negative cross finger test in the index and long fingers.   Intrinsic strength 5/5 .  ___________________________________________________    STUDIES REVIEWED:  I personally reviewed and interpreted right wrist and right elbow ultrasounds which demonstrate median and ulnar nerve enlargement consistent with carpal and cubital tunnel respectively    LABS REVIEWED:    HgA1c:   Lab Results   Component Value Date    HGBA1C 5.3 05/02/2022     BMP:   Lab Results   Component Value Date    CALCIUM 9.0 10/31/2023    K 3.7 10/31/2023    CO2 30 10/31/2023     10/31/2023    BUN 11 10/31/2023    CREATININE 0.51 (L) 10/31/2023             PROCEDURES PERFORMED:  Hand/upper extremity injection: R carpal tunnel  [place] Protocol:  Consent: Verbal consent obtained.  Risks and benefits: risks, benefits and alternatives were discussed  Consent given by: patient  Time out: Immediately prior to procedure a \"time out\" was called to verify the correct patient, procedure, equipment, support staff and site/side marked as required.  Timeout called at: 3/21/2024 11:35 AM.  Patient understanding: patient states understanding of the procedure being performed  Site marked: the operative site was marked  Patient identity confirmed: verbally with patient  Supporting Documentation  Indications: tendon swelling and pain   Procedure Details  Condition:carpal tunnel syndrome Site: R carpal tunnel   Preparation: Patient was prepped and draped in the usual sterile fashion  Needle size: 25 G  Ultrasound guidance: no  Approach: volar  Medications administered: 1 mL " lidocaine 1 %; 40 mg dexamethasone 100 mg/10 mL  Patient tolerance: patient tolerated the procedure well with no immediate complications  Dressing:  Sterile dressing applied           _____________________________________________________      Scribe Attestation      I,:  Jane Abraham am acting as a scribe while in the presence of the attending physician.:       I,:  Azalea Edward MD personally performed the services described in this documentation    as scribed in my presence.:

## 2024-04-22 ENCOUNTER — ANESTHESIA (OUTPATIENT)
Dept: ANESTHESIOLOGY | Facility: HOSPITAL | Age: 34
End: 2024-04-22

## 2024-04-22 ENCOUNTER — ANESTHESIA EVENT (OUTPATIENT)
Dept: ANESTHESIOLOGY | Facility: HOSPITAL | Age: 34
End: 2024-04-22

## 2024-04-25 NOTE — PRE-PROCEDURE INSTRUCTIONS
Pre-Surgery Instructions:   Medication Instructions    fluticasone (FLONASE) 50 mcg/act nasal spray Uses PRN- OK to take day of surgery    meloxicam (Mobic) 15 mg tablet Stop taking 7 days prior to surgery.- instructed by surgeon to hold 7 days     Multiple Vitamin (MULTIVITAMIN) capsule Stop taking 7 days prior to surgery.    psyllium (METAMUCIL) 0.52 g capsule Hold day of surgery.    Pt reports having cleanser and instructions of showering both night before and DOS - pt did verbalize understanding of instructions given.    Pt instructed no hair or body products on skin for DOS.  Pt instructed no razor blade shaving within one week prior to surgery elizabeth at surgical site areas--ok to use electric razor up till 5/5 - no shaving 5/6 or 5/7.    Pt instructed if with any health status changes between now and DOS - notify surgeon office.    Medication instructions for day surgery reviewed. Please use only a sip of water to take your instructed medications. Avoid all over the counter vitamins, supplements and NSAIDS for one week prior to surgery per anesthesia guidelines. Tylenol is ok to take as needed.     You will receive a call one business day prior to surgery with an arrival time and hospital directions. If your surgery is scheduled on a Monday, the hospital will be calling you on the Friday prior to your surgery. If you have not heard from anyone by 8pm, please call the hospital supervisor through the hospital  at [phone]. ([place] [phone] or [place] [phone]).    Do not eat or drink anything after midnight the night before your surgery, including candy, mints, lifesavers, or chewing gum. Do not drink alcohol 24hrs before your surgery. Try not to smoke at least 24hrs before your surgery.       Follow the pre surgery showering instructions as listed in the “My Surgical Experience Booklet” or otherwise provided by your surgeon's office. Do not use a blade to shave the surgical area 1 week  before surgery. It is okay to use a clean electric clippers up to 24 hours before surgery. Do not apply any lotions, creams, including makeup, cologne, deodorant, or perfumes after showering on the day of your surgery. Do not use dry shampoo, hair spray, hair gel, or any type of hair products.     No contact lenses, eye make-up, or artificial eyelashes. Remove nail polish, including gel polish, and any artificial, gel, or acrylic nails if possible. Remove all jewelry including rings and body piercing jewelry.     Wear causal clothing that is easy to take on and off. Consider your type of surgery.    Keep any valuables, jewelry, piercings at home. Please bring any specially ordered equipment (sling, braces) if indicated.    Arrange for a responsible person to drive you to and from the hospital on the day of your surgery. Please confirm the visitor policy for the day of your procedure when you receive your phone call with an arrival time.     Call the surgeon's office with any new illnesses, exposures, or additional questions prior to surgery.    Please reference your “My Surgical Experience Booklet” for additional information to prepare for your upcoming surgery.

## 2024-05-06 ENCOUNTER — ANESTHESIA EVENT (OUTPATIENT)
Dept: PERIOP | Facility: AMBULARY SURGERY CENTER | Age: 34
End: 2024-05-06
Payer: COMMERCIAL

## 2024-05-06 NOTE — H&P
ASSESSMENT/PLAN:       34 year old female here for her symptomatic right carpal and cubital tunnel symptoms.  Patient has significant discomfort and difficulty sleeping secondary to nerve compression , which has been acutely worse in the last few weeks .  We discussed treatment options including corticosteroid injection and anti-inflammatory medication , both the risks and benefits of both ; she wishes to try a cortisone injection to the right carpal tunnel today in the office to help her symptoms. She tolerated it well. An US guided cortisone injection to the right cubital tunnel was ordered to try and get in next week since that would be 6 weeks prior to surgery.   A 1x/daily Mobic was sent to the patient's pharmacy, but MUST be stopped 1 week prior to her surgery date. This information was also provided in her AVS. We will follow up post op.      The patient verbalized understanding of exam findings and treatment plan. We engaged in the shared decision-making process and treatment options were discussed at length with the patient. Surgical and conservative management discussed today along with risks and benefits.     Diagnoses and all orders for this visit:     Cubital tunnel syndrome on right  -     US msk guidance; Future     Carpal tunnel syndrome on right  -     Hand/upper extremity injection: R carpal tunnel              Follow Up:  Return for Follow up post- op.        To Do Next Visit:  Re-evaluation of current issue     ____________________________________________________________________________________________________________________________________________        CHIEF COMPLAINT:       Chief Complaint   Patient presents with    Injections       Wants injections in rt elbow and wrist          SUBJECTIVE:  Charline Osborne is a 34 y.o. year old RHD female who presents today for a follow-up due to increase numbness and tingling into her fingers of her right hand.  Patient is currently scheduled for a  right carpal and cubital tunnel releases scheduled for 5/7/2024.  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  Patient reports that she has increased symptoms into the 2 ulnar digits.  She has tried taking ibuprofen up to 600 mg daily which does help but feels she needs to continue to take it for some relief.   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      I have personally reviewed all the relevant PMH, PSH, SH, FH, Medications and allergies.      PAST MEDICAL HISTORY:  Medical History        Past Medical History:   Diagnosis Date    Allergic       seasonal-dust, pollen, weeds, grass    Anxiety      History of palpitations       with anxiety    Varicella      Wears contact lenses      Wears glasses              PAST SURGICAL HISTORY:  Surgical History         Past Surgical History:   Procedure Laterality Date    [place] LAPAROSCOPY W/RMVL ADNEXAL STRUCTURES Bilateral 5/6/2022     Procedure: SALPINGECTOMY, LAPAROSCOPIC;  Surgeon: Kade Miranda MD;  Location: [facility];  Service: Gynecology    RHINOPLASTY        VAGINAL DELIVERY                FAMILY HISTORY:  Family History         Family History   Problem Relation Age of Onset    Diabetes Mother      Hypertension Mother      Seizures Mother      Depression Mother      Colon cancer Father      Heart disease Father      Hypertension Father      No Known Problems Sister      Depression Sister      Depression Brother      Depression Son      ADD / ADHD Son      No Known Problems Son      Diabetes Maternal Grandmother      Diabetes Paternal Grandmother      Breast cancer Neg Hx      Ovarian cancer Neg Hx              SOCIAL HISTORY:  Social History            Tobacco Use    Smoking status: Never    Smokeless tobacco: Never   Vaping Use    Vaping status: Never Used   Substance Use Topics    Alcohol use: Yes       Comment: socially    Drug use: No         MEDICATIONS:     Current Outpatient Medications:      fluticasone (FLONASE) 50 mcg/act nasal spray, 1 spray into each nostril daily, Disp: 16 g, Rfl: 0    Multiple Vitamin (MULTIVITAMIN) capsule, Take 1 capsule by mouth daily, Disp: , Rfl:     psyllium (METAMUCIL) 0.52 g capsule, Take 1 capsule (0.52 g total) by mouth daily, Disp: 30 capsule, Rfl: 1     ALLERGIES:       Allergies   Allergen Reactions    Dust Mite Extract      Pollen Extract      Tree Extract           REVIEW OF SYSTEMS:  Review of Systems   Constitutional:  Negative for chills, fever and unexpected weight change.   HENT:  Negative for hearing loss, nosebleeds and sore throat.    Eyes:  Negative for pain, redness and visual disturbance.   Respiratory:  Negative for cough, shortness of breath and wheezing.    Cardiovascular:  Negative for chest pain, palpitations and leg swelling.   Gastrointestinal:  Negative for abdominal pain and nausea.   Genitourinary:  Negative for dyspareunia, dysuria and frequency.   Skin:  Negative for rash and wound.   Neurological:  Positive for numbness. Negative for dizziness and headaches.   Psychiatric/Behavioral:  Negative for decreased concentration and suicidal ideas. The patient is not nervous/anxious.          VITALS:  There were no vitals filed for this visit.        _____________________________________________________  PHYSICAL EXAMINATION:  General: well developed and well nourished, alert, oriented times 3, and appears comfortable  Psychiatric: Normal  HEENT: Normocephalic, Atraumatic Trachea Midline, No torticollis  Pulmonary: No audible wheezing or respiratory distress   Cardiovascular: No pitting edema, 2+ radial pulse   Abdominal/GI: abdomen non tender, non distended   Skin: No masses, erythema, lacerations, fluctation, ulcerations  Neurovascular: Sensation Intact to the Radial Nerve, Decreased Sensation to  the Median Nerve, Decreased Sensation to  the Ulnar Nerve, Motor Intact to the Median, Ulnar, Radial Nerve, and Pulses Intact  Musculoskeletal: Normal,  except as noted in detailed exam and in HPI.        MUSCULOSKELETAL EXAMINATION:     Right Carpal Tunnel Exam:     Negative thenar atrophy. Positive phalen's test. Positive carpal tunnel compression. Positive tinels over median nerve at the wrist.  Opposition strength 5/5.  Abduction strength 5/5.       Right Ulnar Nerve Exam:     Negative intrinsic atrophy.  Negative  deformity at the elbow.   Full range of motion with flexion and extension of the elbow.   Positive ulnar nerve compression test at the elbow. Positive tinels over the ulnar nerve at the elbow.  Negative cross finger test in the index and long fingers.   Intrinsic strength 5/5 .  ___________________________________________________     STUDIES REVIEWED:  I personally reviewed and interpreted right wrist and right elbow ultrasounds which demonstrate median and ulnar nerve enlargement consistent with carpal and cubital tunnel respectively

## 2024-05-06 NOTE — ANESTHESIA PREPROCEDURE EVALUATION
"Procedure:  RELEASE CUBITAL TUNNEL WITH POSSIBEL TRANSPOSITION AND ADIPOSE  FLAP (Right: Elbow)  RELEASE CARPAL TUNNEL (Right: Wrist)    Relevant Problems   ANESTHESIA (within normal limits)      CARDIO (within normal limits)      ENDO (within normal limits)      GI/HEPATIC (within normal limits)      /RENAL (within normal limits)      HEMATOLOGY (within normal limits)      NEURO/PSYCH   (+) Anxiety      PULMONARY (within normal limits)      Lab Results   Component Value Date    WBC 7.12 05/02/2022    HGB 14.2 05/02/2022    HCT 41.9 05/02/2022    MCV 86 05/02/2022     05/02/2022     Lab Results   Component Value Date    SODIUM 138 10/31/2023    K 3.7 10/31/2023     10/31/2023    CO2 30 10/31/2023    BUN 11 10/31/2023    CREATININE 0.51 (L) 10/31/2023    CALCIUM 9.0 10/31/2023     No results found for: \"INR\", \"PROTIME\"  Lab Results   Component Value Date    HGBA1C 5.3 05/02/2022            Physical Exam    Airway    Mallampati score: I  TM Distance: >3 FB  Neck ROM: full     Dental   No notable dental hx     Cardiovascular  Cardiovascular exam normal    Pulmonary  Pulmonary exam normal     Other Findings  post-pubertal.      Anesthesia Plan  ASA Score- 1     Anesthesia Type- regional with ASA Monitors.         Additional Monitors:     Airway Plan:            Plan Factors-Exercise tolerance (METS): >4 METS.    Chart reviewed.    Patient summary reviewed.                  Induction- intravenous.    Postoperative Plan-     Informed Consent- Anesthetic plan and risks discussed with patient.  I personally reviewed this patient with the CRNA. Discussed and agreed on the Anesthesia Plan with the CRNA..                "

## 2024-05-07 ENCOUNTER — ANESTHESIA (OUTPATIENT)
Dept: PERIOP | Facility: AMBULARY SURGERY CENTER | Age: 34
End: 2024-05-07
Payer: COMMERCIAL

## 2024-05-07 ENCOUNTER — HOSPITAL ENCOUNTER (OUTPATIENT)
Facility: AMBULARY SURGERY CENTER | Age: 34
Setting detail: OUTPATIENT SURGERY
Discharge: HOME | End: 2024-05-07
Attending: SURGERY | Admitting: SURGERY
Payer: COMMERCIAL

## 2024-05-07 VITALS
TEMPERATURE: 97.6 F | BODY MASS INDEX: 23.55 KG/M2 | OXYGEN SATURATION: 98 % | WEIGHT: 128 LBS | RESPIRATION RATE: 18 BRPM | SYSTOLIC BLOOD PRESSURE: 103 MMHG | HEIGHT: 62 IN | DIASTOLIC BLOOD PRESSURE: 68 MMHG | HEART RATE: 81 BPM

## 2024-05-07 DIAGNOSIS — G56.21 CUBITAL TUNNEL SYNDROME ON RIGHT: Primary | ICD-10-CM

## 2024-05-07 DIAGNOSIS — Z47.89 AFTERCARE FOLLOWING SURGERY OF THE MUSCULOSKELETAL SYSTEM: ICD-10-CM

## 2024-05-07 DIAGNOSIS — G56.02 CARPAL TUNNEL SYNDROME ON LEFT: ICD-10-CM

## 2024-05-07 LAB
EXT PREGNANCY TEST URINE: NEGATIVE
EXT. CONTROL: NORMAL

## 2024-05-07 PROCEDURE — 64718 REVISE ULNAR NERVE AT ELBOW: CPT | Performed by: SURGERY

## 2024-05-07 PROCEDURE — 64721 CARPAL TUNNEL SURGERY: CPT | Performed by: PHYSICIAN ASSISTANT

## 2024-05-07 PROCEDURE — NC001 PR NO CHARGE: Performed by: SURGERY

## 2024-05-07 PROCEDURE — 81025 URINE PREGNANCY TEST: CPT | Performed by: SURGERY

## 2024-05-07 PROCEDURE — 64721 CARPAL TUNNEL SURGERY: CPT | Performed by: SURGERY

## 2024-05-07 PROCEDURE — 64718 REVISE ULNAR NERVE AT ELBOW: CPT | Performed by: PHYSICIAN ASSISTANT

## 2024-05-07 RX ORDER — HYDROCODONE BITARTRATE AND ACETAMINOPHEN 5; 325 MG/1; MG/1
1 TABLET ORAL EVERY 6 HOURS PRN
Status: DISCONTINUED | OUTPATIENT
Start: 2024-05-07 | End: 2024-05-07 | Stop reason: HOSPADM

## 2024-05-07 RX ORDER — LIDOCAINE HYDROCHLORIDE 10 MG/ML
INJECTION, SOLUTION EPIDURAL; INFILTRATION; INTRACAUDAL; PERINEURAL AS NEEDED
Status: DISCONTINUED | OUTPATIENT
Start: 2024-05-07 | End: 2024-05-07

## 2024-05-07 RX ORDER — MIDAZOLAM HYDROCHLORIDE 2 MG/2ML
INJECTION, SOLUTION INTRAMUSCULAR; INTRAVENOUS AS NEEDED
Status: DISCONTINUED | OUTPATIENT
Start: 2024-05-07 | End: 2024-05-07

## 2024-05-07 RX ORDER — MAGNESIUM HYDROXIDE 1200 MG/15ML
LIQUID ORAL AS NEEDED
Status: DISCONTINUED | OUTPATIENT
Start: 2024-05-07 | End: 2024-05-07 | Stop reason: HOSPADM

## 2024-05-07 RX ORDER — FENTANYL CITRATE 50 UG/ML
INJECTION, SOLUTION INTRAMUSCULAR; INTRAVENOUS AS NEEDED
Status: DISCONTINUED | OUTPATIENT
Start: 2024-05-07 | End: 2024-05-07

## 2024-05-07 RX ORDER — PROPOFOL 10 MG/ML
INJECTION, EMULSION INTRAVENOUS CONTINUOUS PRN
Status: DISCONTINUED | OUTPATIENT
Start: 2024-05-07 | End: 2024-05-07

## 2024-05-07 RX ORDER — DEXAMETHASONE SODIUM PHOSPHATE 10 MG/ML
INJECTION, SOLUTION INTRAMUSCULAR; INTRAVENOUS AS NEEDED
Status: DISCONTINUED | OUTPATIENT
Start: 2024-05-07 | End: 2024-05-07

## 2024-05-07 RX ORDER — PROPOFOL 10 MG/ML
INJECTION, EMULSION INTRAVENOUS AS NEEDED
Status: DISCONTINUED | OUTPATIENT
Start: 2024-05-07 | End: 2024-05-07

## 2024-05-07 RX ORDER — HYDROCODONE BITARTRATE AND ACETAMINOPHEN 5; 325 MG/1; MG/1
1 TABLET ORAL EVERY 6 HOURS PRN
Qty: 10 TABLET | Refills: 0 | Status: SHIPPED | OUTPATIENT
Start: 2024-05-07 | End: 2024-05-17

## 2024-05-07 RX ORDER — KETOROLAC TROMETHAMINE 30 MG/ML
INJECTION, SOLUTION INTRAMUSCULAR; INTRAVENOUS AS NEEDED
Status: DISCONTINUED | OUTPATIENT
Start: 2024-05-07 | End: 2024-05-07

## 2024-05-07 RX ORDER — CEFAZOLIN SODIUM 2 G/50ML
2000 SOLUTION INTRAVENOUS ONCE
Status: COMPLETED | OUTPATIENT
Start: 2024-05-07 | End: 2024-05-07

## 2024-05-07 RX ORDER — DEXAMETHASONE SODIUM PHOSPHATE 4 MG/ML
INJECTION, SOLUTION INTRA-ARTICULAR; INTRALESIONAL; INTRAMUSCULAR; INTRAVENOUS; SOFT TISSUE
Status: COMPLETED | OUTPATIENT
Start: 2024-05-07 | End: 2024-05-07

## 2024-05-07 RX ORDER — ROPIVACAINE HYDROCHLORIDE 5 MG/ML
INJECTION, SOLUTION EPIDURAL; INFILTRATION; PERINEURAL
Status: COMPLETED | OUTPATIENT
Start: 2024-05-07 | End: 2024-05-07

## 2024-05-07 RX ORDER — SODIUM CHLORIDE, SODIUM LACTATE, POTASSIUM CHLORIDE, CALCIUM CHLORIDE 600; 310; 30; 20 MG/100ML; MG/100ML; MG/100ML; MG/100ML
INJECTION, SOLUTION INTRAVENOUS CONTINUOUS PRN
Status: DISCONTINUED | OUTPATIENT
Start: 2024-05-07 | End: 2024-05-07

## 2024-05-07 RX ORDER — ONDANSETRON 2 MG/ML
4 INJECTION INTRAMUSCULAR; INTRAVENOUS ONCE AS NEEDED
Status: DISCONTINUED | OUTPATIENT
Start: 2024-05-07 | End: 2024-05-07 | Stop reason: HOSPADM

## 2024-05-07 RX ORDER — ONDANSETRON 2 MG/ML
INJECTION INTRAMUSCULAR; INTRAVENOUS AS NEEDED
Status: DISCONTINUED | OUTPATIENT
Start: 2024-05-07 | End: 2024-05-07

## 2024-05-07 RX ORDER — FENTANYL CITRATE/PF 50 MCG/ML
25 SYRINGE (ML) INJECTION
Status: DISCONTINUED | OUTPATIENT
Start: 2024-05-07 | End: 2024-05-07 | Stop reason: HOSPADM

## 2024-05-07 RX ADMIN — FENTANYL CITRATE 50 MCG: 50 INJECTION INTRAMUSCULAR; INTRAVENOUS at 08:39

## 2024-05-07 RX ADMIN — PROPOFOL 100 MG: 10 INJECTION, EMULSION INTRAVENOUS at 09:15

## 2024-05-07 RX ADMIN — CEFAZOLIN SODIUM 2000 MG: 2 SOLUTION INTRAVENOUS at 09:14

## 2024-05-07 RX ADMIN — ROPIVACAINE HYDROCHLORIDE 25 ML: 5 INJECTION EPIDURAL; INFILTRATION; PERINEURAL at 08:42

## 2024-05-07 RX ADMIN — MIDAZOLAM 2 MG: 1 INJECTION INTRAMUSCULAR; INTRAVENOUS at 08:39

## 2024-05-07 RX ADMIN — ONDANSETRON 4 MG: 2 INJECTION INTRAMUSCULAR; INTRAVENOUS at 09:17

## 2024-05-07 RX ADMIN — PROPOFOL 150 MCG/KG/MIN: 10 INJECTION, EMULSION INTRAVENOUS at 09:19

## 2024-05-07 RX ADMIN — DEXAMETHASONE SODIUM PHOSPHATE 4 MG: 4 INJECTION INTRA-ARTICULAR; INTRALESIONAL; INTRAMUSCULAR; INTRAVENOUS; SOFT TISSUE at 08:42

## 2024-05-07 RX ADMIN — SODIUM CHLORIDE, SODIUM LACTATE, POTASSIUM CHLORIDE, AND CALCIUM CHLORIDE: .6; .31; .03; .02 INJECTION, SOLUTION INTRAVENOUS at 09:12

## 2024-05-07 RX ADMIN — KETOROLAC TROMETHAMINE 30 MG: 30 INJECTION, SOLUTION INTRAMUSCULAR; INTRAVENOUS at 09:21

## 2024-05-07 RX ADMIN — DEXAMETHASONE SODIUM PHOSPHATE 10 MG: 10 INJECTION, SOLUTION INTRAMUSCULAR; INTRAVENOUS at 09:17

## 2024-05-07 RX ADMIN — FENTANYL CITRATE 50 MCG: 50 INJECTION INTRAMUSCULAR; INTRAVENOUS at 09:15

## 2024-05-07 RX ADMIN — LIDOCAINE HYDROCHLORIDE 50 MG: 10 INJECTION, SOLUTION EPIDURAL; INFILTRATION; INTRACAUDAL; PERINEURAL at 09:15

## 2024-05-07 NOTE — DISCHARGE INSTR - AVS FIRST PAGE
Post Operative Instructions    You have had surgery on your arm today, please read and follow the information below:  Elevate your hand above your elbow during the next 24-48 hours to help with swelling.  Place your hand and arm over your head with motion at your shoulder three times a day.  Do not apply any cream/ointment/oil to your incisions including antibiotics.  Do not soak your hands in standing water (dishwater, tubs, Jacuzzi's, pools, etc.) until given permission (typically 2-3 weeks after injury)    Call the office if you notice any:  Increased numbness or tingling of your hand or fingers that is not relieved with elevation.  Increasing pain that is not controlled with medication.  Difficulty chewing, breathing, swallowing.  Chest pains or shortness of breath.  Fever over 101.4 degrees.    Bandage: Please keep bandages clean and dry. Remove bandage after 5 days. Once bandages are removed you may wash hands with soap and water. Short showers are okay as well, but please avoid soaking the hand as described above (ie no pools, baths, dirty dish water, hot tubs, ocean/lake water, etc). Sutures will be removed in the office at your first follow up visit, please do not remove them yourself.    Please do NOT put any topical agents on the surgical wound including neosporin, peroxide, tea tree oil, vitamin E, etc. as these can delay wound healing.    Motion: Move fingers into a fist 5 times a day, DO NOT move any splinted fingers.    Weight bearing status: Avoid heavy lifting (>5 pounds) with the extremity that was operated on until follow up appointment. Normal activities of daily living are OK.    Ice: Ice for 10 minutes every hour as needed for swelling x 24 hours.    Sling: Please use your sling while your arm is numb from the block. When your arm is FULLY awake again, you no longer need this and may use your sling as needed for comfort. While using the sling, make sure to move your shoulder throughout the day  to prevent stiffness here.     Pain medication:   Naproxen 220 mg two times a day (this is over the counter!)  *do not take this medication if you were told by your doctor that you cannot take anti-inflammatories or NSAIDS  Tylenol Extended Release 650 mg every 8 hours (this is over the counter!)   Norco/Hydrocodone one tab every 6 hours ONLY AS NEEDED for severe pain        Follow-up Appointment: 10-14 days with Dr Edward        Please call the office if you have any questions or concerns regarding your post-operative care.

## 2024-05-07 NOTE — ANESTHESIA PROCEDURE NOTES
Peripheral Block    Patient location during procedure: holding area  Start time: 5/7/2024 8:42 AM  Reason for block: at surgeon's request and post-op pain management  Staffing  Performed by: Lonnie Avila MD  Authorized by: Lonnie Avila MD    Preanesthetic Checklist  Completed: patient identified, IV checked, site marked, risks and benefits discussed, surgical consent, monitors and equipment checked, pre-op evaluation and timeout performed  Peripheral Block  Prep: ChloraPrep  Patient monitoring: frequent blood pressure checks, continuous pulse oximetry and heart rate  Block type: Supraclavicular  Laterality: right  Injection technique: single-shot  Procedures: ultrasound guided, Ultrasound guidance required for the procedure to increase accuracy and safety of medication placement and decrease risk of complications.  Ultrasound permanent image saved  ropivacaine (NAROPIN) 0.5 % injection 20 mL - Perineural   25 mL - 5/7/2024 8:42:00 AM  dexamethasone (DECADRON) injection 4 mg - Perineural   4 mg - 5/7/2024 8:42:00 AM  Needle  Needle type: Stimuplex   Needle gauge: 20 G  Needle length: 4 in  Needle localization: anatomical landmarks and ultrasound guidance  Assessment  Injection assessment: incremental injection, frequent aspiration, injected with ease, negative aspiration, negative for heart rate change, no paresthesia on injection, no symptoms of intraneural/intravenous injection and needle tip visualized at all times  Paresthesia pain: none  Post-procedure:  site cleaned  patient tolerated the procedure well with no immediate complications

## 2024-05-07 NOTE — OP NOTE
OPERATIVE REPORT  PATIENT NAME: Charline Osborne  :  1990  MRN: [id number]  Pt Location: [facility]    SURGERY DATE: 24    Surgeons and Role:     * Azalea Edward MD - Primary     * Milli Villa PA-C - Assisting    Pre-Op Diagnosis:  Cubital tunnel syndrome on right [G56.21]  Carpal tunnel syndrome on right  [G56.01]    Post op Diagnosis   Cubital tunnel syndrome on right [G56.21]  Carpal tunnel syndrome on right  [G56.01]    Procedure(s):  RELEASE CUBITAL TUNNEL WITH POSSIBEL TRANSPOSITION AND ADIPOSE  FLAP (Right)  RELEASE CARPAL TUNNEL (Right)    Specimen(s):  No specimens collected during this procedure.    Estimated Blood Loss:   Minimal    Anesthesia Type:   Regional with Sedation    IMPLANTS:  * No implants in log *    PERIOPERATIVE ANTIBIOTICS:    cefazolin, 2 grams    Tourniquet Time: 12 minutes [employer]           Operative Indications:  The patient has a history of Carpal Tunnel Syndrome  right and Cubital Tunnel Syndrome  right that was recalcitrant to conservative management.  The decision was made to bring the patient to the operating room for Open Carpal Tunnel Release  right and Cubital Tunnel Release  right.  Risks of the procedure were explained which include, but are not limited to bleeding; infection; damage to nerves, arteries,veins, tendons; scar; pain; need for reoperation; failure to give desired result; and risks of anaesthesia.  All questions were answered to satisfaction and they were willing to proceed.         Operative Findings:  Hypertrophy TCL   Ulnar nerve compression at Anconeus epitrochlearis and deep fascia of the FCU    Complications:   None    Procedure and Technique:  After the patient, site, and procedure were identified, the patient was brought into the operating room in a supine position.  Regional  anaesthesia and sedation were provided.    The  right upper extremity was then prepped and drapped in a normal, sterile, orthopedic fashion.    A  medium hemaclear was applied in sterile fashion.     An anterior approach to the carpal tunnel was undertaken. A 1.2 cm incision was made in line with the fourth digit, proximal to Alfaro's line.  The skin and the subcutaneous tissue were sharply incised.  Under loupe magnification, dissection was carried down to the palmar fascia and it was incised. The transverse carpal ligament was visualized and  Released distally with a #64 blade. A freer was was passed above and below the TCL in a retrograde fashion, freeing up any adhesions. A Knife Lite was used to release the proximal portion of the transverse carpal ligament under direct visualization.  Distally the superficial arch was identified.  A complete release was carried out and exploration of the carpal canal revealed no significant tenosynovitis. The median nerve and its branches were intact.      A 4cm Incision was made with a 15 blade between medial epicondyle and olecranon.  A branch of the MABC was identified distally and protected.  The ulnar nerve was identified at the level of the cubital tunnel.  The nerve was not found to be subluxed.  The Ulnar nerve was traced 1st traced proximally.  Proximal release was performed under direct visualization  the overlying fascia was released with a scissor up to the level of the intermuscular septum which was incised.  Next dissection was taken proximally both with a scissor and with a 64 blade.  Care was taken preserve any crossing branches of the medial antebrachial cutaneous nerve.  Decompression was taken around the olecranon, where a small anconeus epitrochlearis was noted and release , and then dissection was carried to the FCU fascia.  The super all facial layer of the fascia was released with a knife and the 2 bellies of the FCU spread gently with a scissor.  The deep fascia of the FCU was identified and with the nerve protected all times incised.  Release was taken just past the level of the 1st motor branch  to the FCU.  Once a complete release was verified,  the Elbow was taken through a full range of motion and the nerve was found to be stable with no subluxation. After the release, it was appreciated that the nerve had been significantly constricted at the level of the anconeus epitrochlearis and deep fcu fascia with flattening of the nerve  The tourniquet was brought down and hemostasis was obtained. The wound was copiously irrigated and closed in a layered fashion with 3-0 monocryl  for deep dermal .         At the completion of the procedure, hemostasis was obtained with cautery and direct pressure.  The wounds were copiously irrigated with sterile solution.  The wounds were closed with Prolene, Monocryl, and Steri-strips.  Sterile dressings were applied, including Xeroform, gauze, tweeners, webril, ACE and Sling.  Please note, all sponge, needle, and instrument counts were correct prior to closure.  Loupe magnification was utilized.  The patient tolerated the procedure well.   The aid of Milli RIVAS was required in the approach dissection and handling of soft tissues with retraction and with closure during this cubital tunnel release.  Her aide was instrumental in protecting the medial antebrachial cutaneous branches  during the approach as well as a allowing  for adequate visualization through a minimally invasive incision       I was present for the entire procedure., A qualified resident physician was not available., and A physician assistant was required during the procedure for retraction, tissue handling, dissection and suturing.    Patient Disposition:  PACU     SIGNATURE: Azalea Edward MD  DATE: 05/07/24  TIME: 9:42 AM

## 2024-05-07 NOTE — ANESTHESIA POSTPROCEDURE EVALUATION
Post-Op Assessment Note    CV Status:  Stable  Pain Score: 0    Pain management: adequate       Mental Status:  Arousable and sleepy   PONV Controlled:  None   Airway Patency:  Patent     Post Op Vitals Reviewed: Yes    No anethesia notable event occurred.    Staff: CRNA               BP   91/54   Temp      Pulse  76   Resp   16   SpO2   99

## 2024-05-22 ENCOUNTER — OFFICE VISIT (OUTPATIENT)
Dept: OBGYN CLINIC | Facility: CLINIC | Age: 34
End: 2024-05-22

## 2024-05-22 VITALS — WEIGHT: 128 LBS | BODY MASS INDEX: 23.55 KG/M2 | HEIGHT: 62 IN

## 2024-05-22 DIAGNOSIS — Z98.890 S/P CUBITAL TUNNEL RELEASE: ICD-10-CM

## 2024-05-22 DIAGNOSIS — Z98.890 S/P CARPAL TUNNEL RELEASE: Primary | ICD-10-CM

## 2024-05-22 PROBLEM — G56.21 CUBITAL TUNNEL SYNDROME ON RIGHT: Status: RESOLVED | Noted: 2024-05-07 | Resolved: 2024-05-22

## 2024-05-22 PROCEDURE — 99024 POSTOP FOLLOW-UP VISIT: CPT | Performed by: SURGERY

## 2024-05-22 NOTE — PROGRESS NOTES
Assessment/Plan:  Patient ID: Charline Osborne 34 y.o. female   Surgery: Release Cubital Tunnel With Possibel Transposition And Adipose  Flap - Right and Release Carpal Tunnel - Right  Date of Surgery: 5/7/2024    Sutures removed today  Begin scar massage  OT referral placed today, this will help with motion and strengthening  Gel sleeve provided  Activity as tolerated.       Follow Up:  PRN    To Do Next Visit:         CHIEF COMPLAINT:  Chief Complaint   Patient presents with    Right Wrist - Post-op         SUBJECTIVE:  Charline Osborne is a 34 y.o. year old female who presents for follow up after Release Cubital Tunnel With Possibel Transposition And Adipose  Flap - Right and Release Carpal Tunnel - Right. Today patient has improved paresthesias and is doing well. She notes soreness in the hand and forearm/elbow as well as weakness in the arm.       PHYSICAL EXAMINATION:  General: well developed and well nourished, alert, oriented times 3, and appears comfortable  Psychiatric: Normal    MUSCULOSKELETAL EXAMINATION:  Incision: Clean, dry, intact  Surgery Site: normal, no evidence of infection   Range of Motion:  loose composite fist possible, opposition intact   Neurovascular status: Neuro intact, good cap refill  Activity Restrictions: No restrictions  Done today: Sutures out        STUDIES REVIEWED:  No studies to review        LABS REVIEWED:    HgA1c:   Lab Results   Component Value Date    HGBA1C 5.3 05/02/2022     BMP:   Lab Results   Component Value Date    CALCIUM 9.0 10/31/2023    K 3.7 10/31/2023    CO2 30 10/31/2023     10/31/2023    BUN 11 10/31/2023    CREATININE 0.51 (L) 10/31/2023           PROCEDURES PERFORMED:  Procedures  No Procedures performed today          Milli Villa

## 2024-10-14 ENCOUNTER — TELEPHONE (OUTPATIENT)
Age: 34
End: 2024-10-14

## 2024-10-14 NOTE — TELEPHONE ENCOUNTER
Called and spoke to pt. She had surgery in May and has been having right forearm pain the past 2 months now that she returned to work. Advised pt that she would need to make an appt to have it evaluated but she moved to [place] and she is unable to make appt. Asked if a steroid could be provided. Advised that pt would need to be evaluated before meds could be provided. Advised pt she could go to  in her area. She stated understanding.

## 2024-10-14 NOTE — TELEPHONE ENCOUNTER
Caller: Patient    Doctor: Wagner    Reason for call:     Patient is calling about her right forearm, she is experiencing pain and she recently moved and does not have any insurance at this time.   She is asking for the nurse to call her back to advise what the patient should be doing to help her with the pain.  Please advise the patient.    Call back#: [phone]

## 2024-12-19 ENCOUNTER — TELEPHONE (OUTPATIENT)
Age: 34
End: 2024-12-19

## 2024-12-19 NOTE — TELEPHONE ENCOUNTER
Spoke with patient and she informed me that she moved to [place]     Please remove PCP from her chart

## 2024-12-27 NOTE — TELEPHONE ENCOUNTER
12/26/24 9:47 PM        The office's request has been received, reviewed, and the patient chart updated. The PCP has successfully been removed with a patient attribution note. This message will now be completed.        Thank you  Joseph Cheatham

## (undated) DEVICE — SUT MONOCRYL 3-0 SH 27 IN Y416H

## (undated) DEVICE — STERILE BETHLEHEM PLASTIC HAND: Brand: CARDINAL HEALTH

## (undated) DEVICE — BLADE MINI RND TIP ONE SIDE SHARP

## (undated) DEVICE — GLOVE SRG BIOGEL 6.5

## (undated) DEVICE — PREMIUM DRY TRAY LF: Brand: MEDLINE INDUSTRIES, INC.

## (undated) DEVICE — PACK PBDS MINOR GYN LAP RF

## (undated) DEVICE — TROCAR: Brand: KII® SLEEVE

## (undated) DEVICE — CHLORHEXIDINE 4PCT 4 OZ

## (undated) DEVICE — 3M™ STERI-STRIP™ REINFORCED ADHESIVE SKIN CLOSURES, R1546, 1/4 IN X 4 IN (6 MM X 100 MM), 10 STRIPS/ENVELOPE: Brand: 3M™ STERI-STRIP™

## (undated) DEVICE — GAUZE SPONGES,16 PLY: Brand: CURITY

## (undated) DEVICE — GLOVE SRG BIOGEL 7

## (undated) DEVICE — GLOVE PI ULTRA TOUCH SZ.7.0

## (undated) DEVICE — KNIFE LIGHT 10,PK: Brand: KNIFELIGHT

## (undated) DEVICE — ENSEAL X1 TISSUE SEALER, CURVED JAW, 37 CM SHAFT LENGTH: Brand: ENSEAL

## (undated) DEVICE — GLOVE INDICATOR PI UNDERGLOVE SZ 6.5 BLUE

## (undated) DEVICE — CHLORAPREP HI-LITE 26ML ORANGE

## (undated) DEVICE — ADHESIVE SKIN HIGH VISCOSITY EXOFIN 1ML

## (undated) DEVICE — ACE WRAP 3 IN STERILE

## (undated) DEVICE — TUBING SMOKE EVAC W/FILTRATION DEVICE PLUMEPORT ACTIV

## (undated) DEVICE — GLOVE INDICATOR PI UNDERGLOVE SZ 7 BLUE

## (undated) DEVICE — TROCAR: Brand: KII FIOS FIRST ENTRY

## (undated) DEVICE — SUT MONOCRYL 4-0 PS-2 18 IN Y496G

## (undated) DEVICE — SKN PRP WNG SPNGE PVP SCRB STR: Brand: MEDLINE INDUSTRIES, INC.

## (undated) DEVICE — OCCLUSIVE GAUZE STRIP,3% BISMUTH TRIBROMOPHENATE IN PETROLATUM BLEND: Brand: XEROFORM

## (undated) DEVICE — INTENDED FOR TISSUE SEPARATION, AND OTHER PROCEDURES THAT REQUIRE A SHARP SURGICAL BLADE TO PUNCTURE OR CUT.: Brand: BARD-PARKER SAFETY BLADES SIZE 11, STERILE

## (undated) DEVICE — CUFF TOURNIQUET 18 X 4 IN QUICK CONNECT DISP 1 BLADDER

## (undated) DEVICE — DISPOSABLE EQUIPMENT COVER: Brand: SMALL TOWEL DRAPE

## (undated) DEVICE — NEEDLE 25G X 1 1/2

## (undated) DEVICE — GLOVE INDICATOR PI UNDERGLOVE SZ 7.5 BLUE

## (undated) DEVICE — INTENDED FOR TISSUE SEPARATION, AND OTHER PROCEDURES THAT REQUIRE A SHARP SURGICAL BLADE TO PUNCTURE OR CUT.: Brand: BARD-PARKER ® CARBON RIB-BACK BLADES

## (undated) DEVICE — STRETCH BANDAGE: Brand: CURITY